# Patient Record
Sex: FEMALE | Race: WHITE | NOT HISPANIC OR LATINO | Employment: FULL TIME | URBAN - METROPOLITAN AREA
[De-identification: names, ages, dates, MRNs, and addresses within clinical notes are randomized per-mention and may not be internally consistent; named-entity substitution may affect disease eponyms.]

---

## 2020-08-04 DIAGNOSIS — I10 ESSENTIAL HYPERTENSION: Primary | ICD-10-CM

## 2020-08-04 RX ORDER — AMLODIPINE BESYLATE AND BENAZEPRIL HYDROCHLORIDE 5; 20 MG/1; MG/1
1 CAPSULE ORAL DAILY
COMMUNITY
End: 2020-08-04 | Stop reason: SDUPTHER

## 2020-08-04 RX ORDER — AMLODIPINE BESYLATE AND BENAZEPRIL HYDROCHLORIDE 5; 20 MG/1; MG/1
1 CAPSULE ORAL DAILY
Qty: 30 CAPSULE | Refills: 0 | Status: SHIPPED | OUTPATIENT
Start: 2020-08-04 | End: 2020-09-04 | Stop reason: SDUPTHER

## 2020-08-04 NOTE — TELEPHONE ENCOUNTER
Refill requested for amLODIPine-benazepril (LOTREL 5-20) 5-20 MG per capsule to be sent to the [facility] in [place]  Pt has been called to schedule an appointment

## 2020-09-04 DIAGNOSIS — E11.9 TYPE 2 DIABETES MELLITUS WITHOUT COMPLICATION, WITHOUT LONG-TERM CURRENT USE OF INSULIN (HCC): Primary | ICD-10-CM

## 2020-09-04 DIAGNOSIS — I10 ESSENTIAL HYPERTENSION: ICD-10-CM

## 2020-09-04 RX ORDER — HYDROCHLOROTHIAZIDE 25 MG/1
25 TABLET ORAL DAILY
Qty: 30 TABLET | Refills: 0 | Status: SHIPPED | OUTPATIENT
Start: 2020-09-04 | End: 2021-02-22 | Stop reason: SDUPTHER

## 2020-09-04 RX ORDER — AMLODIPINE BESYLATE AND BENAZEPRIL HYDROCHLORIDE 5; 20 MG/1; MG/1
1 CAPSULE ORAL DAILY
Qty: 30 CAPSULE | Refills: 0 | Status: SHIPPED | OUTPATIENT
Start: 2020-09-04 | End: 2020-10-23

## 2020-09-04 RX ORDER — HYDROCHLOROTHIAZIDE 25 MG/1
25 TABLET ORAL DAILY
COMMUNITY
End: 2020-09-04 | Stop reason: SDUPTHER

## 2020-09-04 NOTE — TELEPHONE ENCOUNTER
Patient requests refills on amlodipine-benazepril 5-20 mg one daily, metformin 500 mg twice daily and hydrochlorothiazide 25 mg one daily to [facility] in [place]

## 2020-09-04 NOTE — TELEPHONE ENCOUNTER
Pt called to reschedule her virtual visit for 9/18 but will still need her refills to be done prior to visit

## 2020-09-18 ENCOUNTER — TELEMEDICINE (OUTPATIENT)
Dept: FAMILY MEDICINE CLINIC | Facility: CLINIC | Age: 60
End: 2020-09-18
Payer: COMMERCIAL

## 2020-09-18 DIAGNOSIS — F32.A DEPRESSION, UNSPECIFIED DEPRESSION TYPE: ICD-10-CM

## 2020-09-18 DIAGNOSIS — R53.83 OTHER FATIGUE: Primary | ICD-10-CM

## 2020-09-18 DIAGNOSIS — E11.9 TYPE 2 DIABETES MELLITUS WITHOUT COMPLICATION, WITHOUT LONG-TERM CURRENT USE OF INSULIN (HCC): ICD-10-CM

## 2020-09-18 DIAGNOSIS — I10 ESSENTIAL HYPERTENSION: ICD-10-CM

## 2020-09-18 PROCEDURE — 1036F TOBACCO NON-USER: CPT | Performed by: FAMILY MEDICINE

## 2020-09-18 PROCEDURE — 99213 OFFICE O/P EST LOW 20 MIN: CPT | Performed by: FAMILY MEDICINE

## 2020-09-18 NOTE — PROGRESS NOTES
Virtual Regular Visit      Assessment/Plan:    Problem List Items Addressed This Visit     None               Reason for visit is No chief complaint on file  Encounter provider Kylee Andersen MD    Provider located at [address] [place] [id number] [phone]      Recent Visits  No visits were found meeting these conditions  Showing recent visits within past 7 days and meeting all other requirements     Today's Visits  Date Type Provider Dept   09/18/20 Telemedicine Kylee Andersen, [address] today's visits and meeting all other requirements     Future Appointments  No visits were found meeting these conditions  Showing future appointments within next 150 days and meeting all other requirements        The patient was identified by name and date of birth  Freddy España was informed that this is a telemedicine visit and that the visit is being conducted through [address] and patient was informed that this is not a secure, HIPAA-complaint platform  She agrees to proceed     My office door was closed  No one else was in the room  She acknowledged consent and understanding of privacy and security of the video platform  The patient has agreed to participate and understands they can discontinue the visit at any time  Patient is aware this is a billable service  Alin Rey is a 62 y o  female    Pt for checkup on HTN, lipids and really wanted to talk today about her stres level and social issues she is having  Pt has a son with progressive MS that is  aboput to [facility] care and has her mother  With stage 4 liver cancer working with her  Pt also has a   With disabilities  Pt  Is getting outside help that is not always reliable  Pt does  Not  Want any meds  But  Does agree to pyschologoical counseling  Pt  Has no suicidal ideation but  Is emotional at times and  Has some sleep issues   Pt is overdue for labs  Past Medical History:   Diagnosis Date    Diabetes mellitus ([facility] [address] )     Hypertension        Past Surgical History:   Procedure Laterality Date    WISDOM TOOTH EXTRACTION         Current Outpatient Medications   Medication Sig Dispense Refill    amLODIPine-benazepril (LOTREL 5-20) 5-20 MG per capsule Take 1 capsule by mouth daily 30 capsule 0    hydrochlorothiazide (HYDRODIURIL) 25 mg tablet Take 1 tablet (25 mg total) by mouth daily 30 tablet 0    metFORMIN (GLUCOPHAGE) 500 mg tablet Take 1 tablet (500 mg total) by mouth 2 (two) times a day with meals 60 tablet 0     No current facility-administered medications for this visit  Allergies not on file    Review of Systems   Constitutional: Negative for activity change, appetite change, fatigue and fever  HENT: Negative for congestion, ear pain, postnasal drip, rhinorrhea, sinus pressure, sinus pain, sneezing and sore throat  Eyes: Negative for pain and redness  Respiratory: Negative for apnea, cough, chest tightness, shortness of breath and wheezing  Cardiovascular: Negative for chest pain, palpitations and leg swelling  Gastrointestinal: Negative for abdominal pain, constipation, diarrhea, nausea and vomiting  Endocrine: Negative for cold intolerance and heat intolerance  Genitourinary: Negative for difficulty urinating, dysuria, frequency, hematuria and urgency  Musculoskeletal: Negative for arthralgias, back pain, gait problem and myalgias  Skin: Negative for rash  Neurological: Negative for dizziness, speech difficulty, weakness, numbness and headaches  Hematological: Does not bruise/bleed easily  Psychiatric/Behavioral: Positive for dysphoric mood and sleep disturbance  Negative for agitation, confusion, hallucinations, self-injury and suicidal ideas  The patient is nervous/anxious  Video Exam    There were no vitals filed for this visit      Physical Exam  Constitutional:       Appearance: Normal appearance  She is obese  HENT:      Head: Normocephalic and atraumatic  Eyes:      Pupils: Pupils are equal, round, and reactive to light  Pulmonary:      Effort: Pulmonary effort is normal    Skin:     General: Skin is warm and dry  Neurological:      General: No focal deficit present  Mental Status: She is alert and oriented to person, place, and time  I spent 15 minutes directly with the patient during this visit      VIRTUAL VISIT DISCLAIMER    Kamran Cornejo acknowledges that she has consented to an online visit or consultation  She understands that the online visit is based solely on information provided by her, and that, in the absence of a face-to-face physical evaluation by the physician, the diagnosis she receives is both limited and provisional in terms of accuracy and completeness  This is not intended to replace a full medical face-to-face evaluation by the physician  Kamran Cornejo understands and accepts these terms

## 2020-10-23 DIAGNOSIS — I10 ESSENTIAL HYPERTENSION: ICD-10-CM

## 2020-10-23 RX ORDER — AMLODIPINE BESYLATE AND BENAZEPRIL HYDROCHLORIDE 5; 20 MG/1; MG/1
1 CAPSULE ORAL DAILY
Qty: 30 CAPSULE | Refills: 0 | Status: SHIPPED | OUTPATIENT
Start: 2020-10-23 | End: 2020-11-21

## 2020-11-20 DIAGNOSIS — I10 ESSENTIAL HYPERTENSION: ICD-10-CM

## 2020-11-20 DIAGNOSIS — E11.9 TYPE 2 DIABETES MELLITUS WITHOUT COMPLICATION, WITHOUT LONG-TERM CURRENT USE OF INSULIN (HCC): ICD-10-CM

## 2020-11-21 RX ORDER — AMLODIPINE BESYLATE AND BENAZEPRIL HYDROCHLORIDE 5; 20 MG/1; MG/1
1 CAPSULE ORAL DAILY
Qty: 30 CAPSULE | Refills: 0 | Status: SHIPPED | OUTPATIENT
Start: 2020-11-21 | End: 2021-01-05

## 2021-01-03 DIAGNOSIS — E11.9 TYPE 2 DIABETES MELLITUS WITHOUT COMPLICATION, WITHOUT LONG-TERM CURRENT USE OF INSULIN (HCC): ICD-10-CM

## 2021-01-03 DIAGNOSIS — I10 ESSENTIAL HYPERTENSION: ICD-10-CM

## 2021-01-05 RX ORDER — AMLODIPINE BESYLATE AND BENAZEPRIL HYDROCHLORIDE 5; 20 MG/1; MG/1
1 CAPSULE ORAL DAILY
Qty: 30 CAPSULE | Refills: 0 | Status: SHIPPED | OUTPATIENT
Start: 2021-01-05 | End: 2021-02-09

## 2021-02-09 DIAGNOSIS — I10 ESSENTIAL HYPERTENSION: ICD-10-CM

## 2021-02-09 RX ORDER — AMLODIPINE BESYLATE AND BENAZEPRIL HYDROCHLORIDE 5; 20 MG/1; MG/1
CAPSULE ORAL
Qty: 30 CAPSULE | Refills: 0 | Status: SHIPPED | OUTPATIENT
Start: 2021-02-09 | End: 2021-03-16 | Stop reason: SDUPTHER

## 2021-02-22 ENCOUNTER — OFFICE VISIT (OUTPATIENT)
Dept: INTERNAL MEDICINE CLINIC | Facility: CLINIC | Age: 61
End: 2021-02-22
Payer: COMMERCIAL

## 2021-02-22 VITALS
DIASTOLIC BLOOD PRESSURE: 102 MMHG | SYSTOLIC BLOOD PRESSURE: 140 MMHG | TEMPERATURE: 98.2 F | HEART RATE: 97 BPM | WEIGHT: 292.4 LBS | BODY MASS INDEX: 49.92 KG/M2 | OXYGEN SATURATION: 97 % | HEIGHT: 64 IN

## 2021-02-22 DIAGNOSIS — Z23 NEED FOR PNEUMOCOCCAL VACCINATION: ICD-10-CM

## 2021-02-22 DIAGNOSIS — F32.A DEPRESSION, UNSPECIFIED DEPRESSION TYPE: ICD-10-CM

## 2021-02-22 DIAGNOSIS — I10 ESSENTIAL HYPERTENSION: Primary | ICD-10-CM

## 2021-02-22 DIAGNOSIS — E66.01 MORBID OBESITY (HCC): ICD-10-CM

## 2021-02-22 DIAGNOSIS — E11.9 TYPE 2 DIABETES MELLITUS WITHOUT COMPLICATION, WITHOUT LONG-TERM CURRENT USE OF INSULIN (HCC): ICD-10-CM

## 2021-02-22 PROCEDURE — 3725F SCREEN DEPRESSION PERFORMED: CPT | Performed by: INTERNAL MEDICINE

## 2021-02-22 PROCEDURE — 90471 IMMUNIZATION ADMIN: CPT

## 2021-02-22 PROCEDURE — 90732 PPSV23 VACC 2 YRS+ SUBQ/IM: CPT

## 2021-02-22 PROCEDURE — 99244 OFF/OP CNSLTJ NEW/EST MOD 40: CPT | Performed by: INTERNAL MEDICINE

## 2021-02-22 RX ORDER — MELATONIN
1000 DAILY
COMMUNITY

## 2021-02-22 RX ORDER — HYDROCHLOROTHIAZIDE 25 MG/1
25 TABLET ORAL DAILY
Qty: 90 TABLET | Refills: 1 | Status: SHIPPED | OUTPATIENT
Start: 2021-02-22 | End: 2021-03-22 | Stop reason: ALTCHOICE

## 2021-02-22 RX ORDER — ATORVASTATIN CALCIUM 40 MG/1
40 TABLET, FILM COATED ORAL
Qty: 30 TABLET | Refills: 3 | Status: SHIPPED | OUTPATIENT
Start: 2021-02-22 | End: 2021-09-20

## 2021-02-22 RX ORDER — FERROUS SULFATE 325(65) MG
325 TABLET ORAL EVERY OTHER DAY
COMMUNITY
End: 2021-03-22 | Stop reason: ALTCHOICE

## 2021-02-22 NOTE — PATIENT INSTRUCTIONS
Healthy Diet   The American Heart Association and the [employer] of Cardiology have long recommended a healthy diet for not only patients who are at risk for atherosclerotic cardiovascular disease (ASCVD) but also the general public  In keeping with this evidence-based recommendation, the "  2018 Guideline on the Management of Blood Cholesterol" stresses that a healthy diet should include adequate intake of these essentials:    Vegetables, fruits, and whole grains    Legumes and nuts    Low-fat dairy products    Low-fat poultry (without the skin)    Fish and seafood    Nontropical vegetable oils     The 2018 guideline does provide room for cultural food preferences in a healthy diet, but in general, all patients should limit their intake of saturated and trans fats, sweets, sugar-sweetened beverages, and red meats  Physical Activity   In addition to a healthy diet, all patients should include regular physical activity in their weekly routines, at moderate to vigorous intensity  Any activity is better than nothing, so if your patients can't meet the recommendation of vigorous activity, moderate-intensity activity can still help them reduce their risk of ASCVD  Below are the American Heart Association's recommendations for physical activity per week (preferably spread throughout the week):      For Overall Cardiovascular Health and Lowering Cholesterol    At least 150 minutes of moderate-intensity physical activity (for example, 30 minutes, 5 days a week), or    At least 75 minutes of vigorous-intensity physical activity (for example, 25 minutes, 3 days a week); or    A combination of moderate- and vigorous-intensity aerobic activity, and    At least 2 days of moderate- to high-intensity muscle-strengthening activities (such as resistance weight training) for additional health benefits    Weight Control   It's important to work with patients to help them reach and maintain a healthy weight (Table 3)  You may need to suggest that they adjust their caloric intake to avoid weight gain or, in overweight and obese patients, to promote weight loss  Table 3  Body Mass Index         For the full version of the 2018 Cholesterol Guideline, see Joey Dos Satnos, Lara Morse, Lavinia, et al  2018 AHA/ACC/AACVPR/AAPA/ABC/ACPM/ADA/AGS/APhA/ASPC/NLA/PCNA guideline on the management of blood cholesterol: a report of the [place] Task Force on Clinical Practice Guidelines [published online November 10, 2018]  Circulation  © Copyright 2018 American Heart Association, Inc , a 501(c)(3) not-for-profit  All rights reserved  Check  Change  Control  Cholesterol is a trademark  Unauthorized use prohibited   [id number]

## 2021-02-23 NOTE — ASSESSMENT & PLAN NOTE
No results found for: HGBA1C   · No known prior A1c  Will check A1c to assess disease severity  Only on monotherapy with Metformin 500mg once daily  · I have asked the patient to increase Metformin to twice daily, if tolerating, likely will titrate up to 1000mg twice daily at follow-up  May need additional oral agents and/or insulin therapy based on her A1c  · Discussed diabetic diet/carbohydrate counting  Patient has BG monitor/supplies though does not check her BG on Metformin  Will discuss monitoring when we have a better idea of her disease severity and possible need for additional antihyperglycemic agents based on A1c  · Follows with ophthalmologist yearly and screened for diabetic retinopathy  · Refer to podiatry - discussed importance of foot care  · Start atorvastatin 40mg every evening (ASVD risk >7 5%) - patient amenable for ASCVD risk reduction in the setting of DM2  LDL goal less than 100, ideally less than 70    Check lipid panel for baseline lipid profile prior to starting statin therapy   · Check microalbumin/Cr ratio - On ACEi already  · Patient to receive PPSV 23 today

## 2021-02-23 NOTE — ASSESSMENT & PLAN NOTE
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

## 2021-02-23 NOTE — PROGRESS NOTES
INTERNAL MEDICINE INITIAL OFFICE VISIT  [facility] - [facility] INTERNAL MEDICINE [place]    NAME: Jerman Amezcua  AGE: 62 y o  SEX: female  : 1960     DATE: 2021     Assessment and Plan:     Problem List Items Addressed This Visit        Endocrine    Type 2 diabetes mellitus without complication, without long-term current use of insulin ([address] )       No results found for: HGBA1C   · No known prior A1c  Will check A1c to assess disease severity  Only on monotherapy with Metformin 500mg once daily  · I have asked the patient to increase Metformin to twice daily, if tolerating, likely will titrate up to 1000mg twice daily at follow-up  May need additional oral agents and/or insulin therapy based on her A1c  · Discussed diabetic diet/carbohydrate counting  Patient has BG monitor/supplies though does not check her BG on Metformin  Will discuss monitoring when we have a better idea of her disease severity and possible need for additional antihyperglycemic agents based on A1c  · Follows with ophthalmologist yearly and screened for diabetic retinopathy  · Refer to podiatry - discussed importance of foot care  · Start atorvastatin 40mg every evening (ASVD risk >7 5%) - patient amenable for ASCVD risk reduction in the setting of DM2  LDL goal less than 100, ideally less than 70    Check lipid panel for baseline lipid profile prior to starting statin therapy   · Check microalbumin/Cr ratio - On ACEi already  · Patient to receive PPSV 23 today         Relevant Medications    atorvastatin (LIPITOR) 40 mg tablet    metFORMIN (GLUCOPHAGE) 500 mg tablet    Other Relevant Orders    CBC and differential    Comprehensive metabolic panel    Lipid Panel with Direct LDL reflex    HEMOGLOBIN A1C W/ EAG ESTIMATION    Microalbumin / creatinine urine ratio    Ambulatory referral to Podiatry       Cardiovascular and Mediastinum    Essential hypertension - Primary     · Currently above goal  SBP goal <120 DBP goal<80  I have asked her to take her HCTZ 25mg daily rather than prn as she was doing prior  Continue Lotrel 5-20mg daily for now  Discussed keeping daily BP log  · Will reassess in follow-up in 4 weeks         Relevant Medications    hydrochlorothiazide (HYDRODIURIL) 25 mg tablet    Other Relevant Orders    CBC and differential    Comprehensive metabolic panel    Lipid Panel with Direct LDL reflex    TSH, 3rd generation with Free T4 reflex       Other    Depression     · Appropriate grief response with loss of her mother last month to pancreatic CA  Patient'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  PHQ-9 = 9  Will continue to provide emotional support/resources as patient allows  Morbid obesity ([facility] [address] )     BMI Counseling: Body mass index is 50 19 kg/m²  The BMI is above normal  Nutrition recommendations include reducing portion sizes, decreasing overall calorie intake, 3-5 servings of fruits/vegetables daily, reducing fast food intake, consuming healthier snacks, decreasing soda and/or juice intake, moderation in carbohydrate intake, increasing intake of lean protein, reducing intake of saturated fat and trans fat and reducing intake of cholesterol  Exercise recommendations include moderate aerobic physical activity for 150 minutes/week, exercising 3-5 times per week and joining a gym  Other Visit Diagnoses     Need for pneumococcal vaccination        Relevant Orders    PNEUMOCOCCAL POLYSACCHARIDE VACCINE 23-VALENT =>1YO SQ IM (Completed)          Return in about 4 weeks (around 3/22/2021)  Patient to have an annual physical for her next office visit  Will discuss age-specific cancer screenings at that time as well as provide more counseling     Chief Complaint:     Chief Complaint   Patient presents with    Establish Care     establish care      History of Present Illness:     I had the pleasure of seeing Ms Vish Lemus in the office today to establish care     Patient has a past medical history significant for type 2 diabetes mellitus, hypertension, obesity, and depression  Patient is maintained on metformin 500 mg daily for her diabetes  Patient has not seen a physician in a few years  She does have a glucometer as well as testing supplies at home though does not check her blood glucose regularly  She reports a poor diet and little physical activity  She does not restrict or count carbohydrates  Patient has had multiple stressors in her life over the past few years and has put family members medical conditions before her own  Patient has a son with multiple sclerosis home the patient is also a caregiver for  In addition, patient's mother was on home hospice with her at home and passed away last month  She was also a caregiver for her over the last year and a half  Her  also has medical problems and she is involved in his medical care as well  She denies any shortness of breath/  PND /orthopnea or chest pain  She does have some lower extremity edema with overlying skin changes which she states is chronic  She states it does improve a little with leg elevation  She is not on any diuretic therapy  She has no known history of heart failure or coronary artery disease  The following portions of the patient's history were reviewed and updated as appropriate: allergies, current medications, past family history, past medical history, past social history, past surgical history and problem list      Review of Systems:     Review of Systems   Constitutional: Positive for activity change and fatigue  Negative for appetite change, chills and fever  HENT: Negative for congestion, rhinorrhea and sore throat  Respiratory: Negative for shortness of breath and wheezing  Cardiovascular: Positive for leg swelling  Negative for chest pain and palpitations  Gastrointestinal: Negative for abdominal pain, blood in stool, constipation, diarrhea, nausea and vomiting  Genitourinary: Negative for difficulty urinating, frequency and vaginal bleeding  Musculoskeletal: Positive for arthralgias  Neurological: Negative for dizziness, seizures, weakness and headaches  Psychiatric/Behavioral: Negative for self-injury and suicidal ideas  Past Medical History:     Past Medical History:   Diagnosis Date    Diabetes mellitus ([address] )     Hypertension         Past Surgical History:     Past Surgical History:   Procedure Laterality Date    BREAST BIOPSY Right 2016    Done at [address] [place] [address]          Social History:   She reports that she has quit smoking  Her smoking use included cigarettes  She has a 30 00 pack-year smoking history  She has never used smokeless tobacco  She reports previous alcohol use  She reports current drug use  Drug: Marijuana       Family History:     Family History   Problem Relation Age of Onset    Heart disease Mother     Diabetes Mother     Liver cancer Mother     Kidney cancer Mother     Heart disease Father     Colon cancer Father     Skin cancer Father     Stroke Brother         Current Medications:     Current Outpatient Medications:     amLODIPine-benazepril (LOTREL 5-20) 5-20 MG per capsule, take 1 capsule by mouth once daily, Disp: 30 capsule, Rfl: 0    cholecalciferol (VITAMIN D3) 1,000 units tablet, Take 1,000 Units by mouth daily, Disp: , Rfl:     ferrous sulfate 325 (65 Fe) mg tablet, Take 325 mg by mouth every other day, Disp: , Rfl:     hydrochlorothiazide (HYDRODIURIL) 25 mg tablet, Take 1 tablet (25 mg total) by mouth daily, Disp: 90 tablet, Rfl: 1    metFORMIN (GLUCOPHAGE) 500 mg tablet, Take 1 tablet (500 mg total) by mouth 2 (two) times a day with meals, Disp: 60 tablet, Rfl: 0    atorvastatin (LIPITOR) 40 mg tablet, Take 1 tablet (40 mg total) by mouth daily after dinner, Disp: 30 tablet, Rfl: 3     Allergies:   No Known Allergies     Physical Exam:     BP (!) 140/102 (BP Location: Left arm, Patient Position: Sitting, Cuff Size: Adult)   Pulse 97   Temp 98 2 °F (36 8 °C) (Tympanic)   Ht 5' 4" (1 626 m)   Wt 133 kg (292 lb 6 4 oz)   SpO2 97%   BMI 50 19 kg/m²     Physical Exam  Vitals signs reviewed  Constitutional:       General: She is not in acute distress  Appearance: She is not toxic-appearing  Cardiovascular:      Rate and Rhythm: Normal rate and regular rhythm  Heart sounds: No murmur  Pulmonary:      Effort: No respiratory distress  Breath sounds: No wheezing or rhonchi  Abdominal:      General: Bowel sounds are normal  There is no distension  Palpations: Abdomen is soft  Tenderness: There is no abdominal tenderness  There is no guarding  Comments: Obese abdomen   Musculoskeletal:         General: Swelling (1+ pitting edema with overlying venous stasis changes) present  Neurological:      General: No focal deficit present  Mental Status: She is alert and oriented to person, place, and time  Mental status is at baseline  Data:     Laboratory Results: I have personally reviewed the pertinent laboratory results/reports   Radiology/Other Diagnostic Testing Results: I have personally reviewed pertinent reports        Alyse Davies MD  [facility] INTERNAL MEDICINE [place]

## 2021-02-23 NOTE — ASSESSMENT & PLAN NOTE
BMI Counseling: Body mass index is 50 19 kg/m²  The BMI is above normal  Nutrition recommendations include reducing portion sizes, decreasing overall calorie intake, 3-5 servings of fruits/vegetables daily, reducing fast food intake, consuming healthier snacks, decreasing soda and/or juice intake, moderation in carbohydrate intake, increasing intake of lean protein, reducing intake of saturated fat and trans fat and reducing intake of cholesterol  Exercise recommendations include moderate aerobic physical activity for 150 minutes/week, exercising 3-5 times per week and joining a gym

## 2021-02-23 NOTE — ASSESSMENT & PLAN NOTE
· Currently above goal  SBP goal <120 DBP goal<80  I have asked her to take her HCTZ 25mg daily rather than prn as she was doing prior  Continue Lotrel 5-20mg daily for now  Discussed keeping daily BP log    · Will reassess in follow-up in 4 weeks

## 2021-02-27 ENCOUNTER — LAB (OUTPATIENT)
Dept: LAB | Facility: CLINIC | Age: 61
End: 2021-02-27
Payer: COMMERCIAL

## 2021-02-27 ENCOUNTER — TRANSCRIBE ORDERS (OUTPATIENT)
Dept: LAB | Facility: CLINIC | Age: 61
End: 2021-02-27

## 2021-02-27 DIAGNOSIS — I10 ESSENTIAL HYPERTENSION: ICD-10-CM

## 2021-02-27 DIAGNOSIS — E11.9 TYPE 2 DIABETES MELLITUS WITHOUT COMPLICATION, WITHOUT LONG-TERM CURRENT USE OF INSULIN (HCC): ICD-10-CM

## 2021-02-27 LAB
ALBUMIN SERPL BCP-MCNC: 3.7 G/DL (ref 3.5–5)
ALP SERPL-CCNC: 66 U/L (ref 46–116)
ALT SERPL W P-5'-P-CCNC: 40 U/L (ref 12–78)
ANION GAP SERPL CALCULATED.3IONS-SCNC: 5 MMOL/L (ref 4–13)
AST SERPL W P-5'-P-CCNC: 19 U/L (ref 5–45)
BACTERIA UR QL AUTO: ABNORMAL /HPF
BASOPHILS # BLD AUTO: 0.07 THOUSANDS/ΜL (ref 0–0.1)
BASOPHILS NFR BLD AUTO: 1 % (ref 0–1)
BILIRUB SERPL-MCNC: 0.7 MG/DL (ref 0.2–1)
BILIRUB UR QL STRIP: NEGATIVE
BUN SERPL-MCNC: 19 MG/DL (ref 5–25)
CALCIUM SERPL-MCNC: 9.2 MG/DL (ref 8.3–10.1)
CHLORIDE SERPL-SCNC: 105 MMOL/L (ref 100–108)
CHOLEST SERPL-MCNC: 169 MG/DL (ref 50–200)
CLARITY UR: ABNORMAL
CO2 SERPL-SCNC: 31 MMOL/L (ref 21–32)
COLOR UR: YELLOW
CREAT SERPL-MCNC: 0.89 MG/DL (ref 0.6–1.3)
CREAT UR-MCNC: 131 MG/DL
EOSINOPHIL # BLD AUTO: 0.1 THOUSAND/ΜL (ref 0–0.61)
EOSINOPHIL NFR BLD AUTO: 2 % (ref 0–6)
ERYTHROCYTE [DISTWIDTH] IN BLOOD BY AUTOMATED COUNT: 14.6 % (ref 11.6–15.1)
EST. AVERAGE GLUCOSE BLD GHB EST-MCNC: 154 MG/DL
GFR SERPL CREATININE-BSD FRML MDRD: 71 ML/MIN/1.73SQ M
GLUCOSE P FAST SERPL-MCNC: 124 MG/DL (ref 65–99)
GLUCOSE UR STRIP-MCNC: NEGATIVE MG/DL
HBA1C MFR BLD: 7 %
HCT VFR BLD AUTO: 44.3 % (ref 34.8–46.1)
HDLC SERPL-MCNC: 61 MG/DL
HGB BLD-MCNC: 13.7 G/DL (ref 11.5–15.4)
HGB UR QL STRIP.AUTO: NEGATIVE
IMM GRANULOCYTES # BLD AUTO: 0.02 THOUSAND/UL (ref 0–0.2)
IMM GRANULOCYTES NFR BLD AUTO: 0 % (ref 0–2)
KETONES UR STRIP-MCNC: NEGATIVE MG/DL
LDLC SERPL CALC-MCNC: 95 MG/DL (ref 0–100)
LEUKOCYTE ESTERASE UR QL STRIP: NEGATIVE
LYMPHOCYTES # BLD AUTO: 1.48 THOUSANDS/ΜL (ref 0.6–4.47)
LYMPHOCYTES NFR BLD AUTO: 23 % (ref 14–44)
MCH RBC QN AUTO: 26.9 PG (ref 26.8–34.3)
MCHC RBC AUTO-ENTMCNC: 30.9 G/DL (ref 31.4–37.4)
MCV RBC AUTO: 87 FL (ref 82–98)
MICROALBUMIN UR-MCNC: 17.7 MG/L (ref 0–20)
MICROALBUMIN/CREAT 24H UR: 14 MG/G CREATININE (ref 0–30)
MONOCYTES # BLD AUTO: 0.56 THOUSAND/ΜL (ref 0.17–1.22)
MONOCYTES NFR BLD AUTO: 9 % (ref 4–12)
NEUTROPHILS # BLD AUTO: 4.31 THOUSANDS/ΜL (ref 1.85–7.62)
NEUTS SEG NFR BLD AUTO: 65 % (ref 43–75)
NITRITE UR QL STRIP: NEGATIVE
NON-SQ EPI CELLS URNS QL MICRO: ABNORMAL /HPF
NRBC BLD AUTO-RTO: 0 /100 WBCS
PH UR STRIP.AUTO: 6 [PH]
PLATELET # BLD AUTO: 260 THOUSANDS/UL (ref 149–390)
PMV BLD AUTO: 10.5 FL (ref 8.9–12.7)
POTASSIUM SERPL-SCNC: 4.7 MMOL/L (ref 3.5–5.3)
PROT SERPL-MCNC: 6.9 G/DL (ref 6.4–8.2)
PROT UR STRIP-MCNC: NEGATIVE MG/DL
RBC # BLD AUTO: 5.09 MILLION/UL (ref 3.81–5.12)
RBC #/AREA URNS AUTO: ABNORMAL /HPF
SODIUM SERPL-SCNC: 141 MMOL/L (ref 136–145)
SP GR UR STRIP.AUTO: 1.02 (ref 1–1.03)
TRIGL SERPL-MCNC: 65 MG/DL
TSH SERPL DL<=0.05 MIU/L-ACNC: 3.42 UIU/ML (ref 0.36–3.74)
UROBILINOGEN UR QL STRIP.AUTO: 0.2 E.U./DL
WBC # BLD AUTO: 6.54 THOUSAND/UL (ref 4.31–10.16)
WBC #/AREA URNS AUTO: ABNORMAL /HPF

## 2021-02-27 PROCEDURE — 85025 COMPLETE CBC W/AUTO DIFF WBC: CPT

## 2021-02-27 PROCEDURE — 82043 UR ALBUMIN QUANTITATIVE: CPT | Performed by: INTERNAL MEDICINE

## 2021-02-27 PROCEDURE — 3051F HG A1C>EQUAL 7.0%<8.0%: CPT | Performed by: INTERNAL MEDICINE

## 2021-02-27 PROCEDURE — 84443 ASSAY THYROID STIM HORMONE: CPT

## 2021-02-27 PROCEDURE — 83036 HEMOGLOBIN GLYCOSYLATED A1C: CPT

## 2021-02-27 PROCEDURE — 82570 ASSAY OF URINE CREATININE: CPT | Performed by: INTERNAL MEDICINE

## 2021-02-27 PROCEDURE — 80061 LIPID PANEL: CPT

## 2021-02-27 PROCEDURE — 3061F NEG MICROALBUMINURIA REV: CPT | Performed by: INTERNAL MEDICINE

## 2021-02-27 PROCEDURE — 80053 COMPREHEN METABOLIC PANEL: CPT

## 2021-02-27 PROCEDURE — 36415 COLL VENOUS BLD VENIPUNCTURE: CPT

## 2021-02-27 PROCEDURE — 81001 URINALYSIS AUTO W/SCOPE: CPT | Performed by: FAMILY MEDICINE

## 2021-03-10 DIAGNOSIS — Z23 ENCOUNTER FOR IMMUNIZATION: ICD-10-CM

## 2021-03-14 DIAGNOSIS — I10 ESSENTIAL HYPERTENSION: ICD-10-CM

## 2021-03-15 RX ORDER — AMLODIPINE BESYLATE AND BENAZEPRIL HYDROCHLORIDE 5; 20 MG/1; MG/1
1 CAPSULE ORAL DAILY
Qty: 30 CAPSULE | Refills: 0 | OUTPATIENT
Start: 2021-03-15

## 2021-03-16 DIAGNOSIS — I10 ESSENTIAL HYPERTENSION: ICD-10-CM

## 2021-03-16 RX ORDER — AMLODIPINE BESYLATE AND BENAZEPRIL HYDROCHLORIDE 5; 20 MG/1; MG/1
1 CAPSULE ORAL DAILY
Qty: 90 CAPSULE | Refills: 1 | Status: SHIPPED | OUTPATIENT
Start: 2021-03-16 | End: 2021-03-22

## 2021-03-16 RX ORDER — AMLODIPINE BESYLATE AND BENAZEPRIL HYDROCHLORIDE 5; 20 MG/1; MG/1
1 CAPSULE ORAL DAILY
Qty: 30 CAPSULE | Refills: 0 | Status: CANCELLED | OUTPATIENT
Start: 2021-03-16

## 2021-03-22 ENCOUNTER — OFFICE VISIT (OUTPATIENT)
Dept: INTERNAL MEDICINE CLINIC | Facility: CLINIC | Age: 61
End: 2021-03-22
Payer: COMMERCIAL

## 2021-03-22 VITALS
HEART RATE: 84 BPM | OXYGEN SATURATION: 97 % | BODY MASS INDEX: 50.02 KG/M2 | SYSTOLIC BLOOD PRESSURE: 134 MMHG | DIASTOLIC BLOOD PRESSURE: 88 MMHG | HEIGHT: 64 IN | WEIGHT: 293 LBS | TEMPERATURE: 97.7 F

## 2021-03-22 DIAGNOSIS — C43.30 MALIGNANT MELANOMA OF FACE (HCC): ICD-10-CM

## 2021-03-22 DIAGNOSIS — I10 ESSENTIAL HYPERTENSION: ICD-10-CM

## 2021-03-22 DIAGNOSIS — Z12.31 SCREENING MAMMOGRAM, ENCOUNTER FOR: ICD-10-CM

## 2021-03-22 DIAGNOSIS — E11.9 TYPE 2 DIABETES MELLITUS WITHOUT COMPLICATION, WITHOUT LONG-TERM CURRENT USE OF INSULIN (HCC): Primary | ICD-10-CM

## 2021-03-22 DIAGNOSIS — E66.01 MORBID OBESITY (HCC): ICD-10-CM

## 2021-03-22 DIAGNOSIS — Z12.11 SCREENING FOR COLON CANCER: ICD-10-CM

## 2021-03-22 DIAGNOSIS — R25.2 MUSCLE CRAMPS: ICD-10-CM

## 2021-03-22 DIAGNOSIS — M79.89 LEG SWELLING: ICD-10-CM

## 2021-03-22 PROCEDURE — 99215 OFFICE O/P EST HI 40 MIN: CPT | Performed by: INTERNAL MEDICINE

## 2021-03-22 PROCEDURE — 1036F TOBACCO NON-USER: CPT | Performed by: INTERNAL MEDICINE

## 2021-03-22 PROCEDURE — 3008F BODY MASS INDEX DOCD: CPT | Performed by: INTERNAL MEDICINE

## 2021-03-22 PROCEDURE — 3075F SYST BP GE 130 - 139MM HG: CPT | Performed by: INTERNAL MEDICINE

## 2021-03-22 PROCEDURE — 3079F DIAST BP 80-89 MM HG: CPT | Performed by: INTERNAL MEDICINE

## 2021-03-22 RX ORDER — AMLODIPINE BESYLATE AND BENAZEPRIL HYDROCHLORIDE 5; 40 MG/1; MG/1
1 CAPSULE ORAL DAILY
Qty: 30 CAPSULE | Refills: 3 | Status: SHIPPED | OUTPATIENT
Start: 2021-03-22 | End: 2021-09-02 | Stop reason: SDUPTHER

## 2021-03-22 RX ORDER — FUROSEMIDE 20 MG/1
20 TABLET ORAL AS NEEDED
Qty: 30 TABLET | Refills: 0 | Status: SHIPPED | OUTPATIENT
Start: 2021-03-22 | End: 2021-05-17

## 2021-03-22 RX ORDER — POTASSIUM CHLORIDE 20 MEQ/1
20 TABLET, EXTENDED RELEASE ORAL AS NEEDED
Qty: 30 TABLET | Refills: 5 | Status: SHIPPED | OUTPATIENT
Start: 2021-03-22 | End: 2022-03-30

## 2021-03-22 NOTE — PROGRESS NOTES
Assessment/Plan:    Type 2 diabetes mellitus without complication, without long-term current use of insulin ([facility])    Lab Results   Component Value Date    HGBA1C 7 0 (H) 02/27/2021     Plan:  · Patient was advised to take metformin 1000 mg twice a day as she reported good tolerance to the up titrating since last visit  · Check hemoglobin A1c in 6 months  ·  continue statin  ·  microalbumin was checked and was normal  ·  continue diabetic diet  Patient is compliant  ·   Referral to podiatry was placed on prior visit  ·  patient follows up with Ophthalmology routinely and up-to-date with her exams  Essential hypertension   Blood pressure has been better controlled since last visit  Still slightly above goal     Plan:  ·   Will increase Lotrel does to 5- 40 mg up from 5- 20 mg  ·  patient is reporting cramping from hydrochlorothiazide therefore will discontinue and start on Lasix 20 mg only as needed for leg swelling and also prescribed potassium supplement 20 mEq on the days that she takes Lasix on  ·  advised to continue monitor blood pressure and keep a log of it    Leg swelling   Start Lasix 20 mg as needed for leg swelling with potassium chloride supplements 20 mEq on the days that you use Lasix on    Morbid obesity (HCC)   Discussed lifestyle changes    Malignant melanoma of face ([facility] [address] )   Refer to dermatology       Diagnoses and all orders for this visit:    Type 2 diabetes mellitus without complication, without long-term current use of insulin ([facility])  -     metFORMIN (GLUCOPHAGE) 500 mg tablet; Take 2 tablets (1,000 mg total) by mouth 2 (two) times a day with meals  -     Hemoglobin A1C; Future  -     Basic metabolic panel; Future    Essential hypertension  -     amLODIPine-benazepril (LOTREL) 5-40 MG per capsule; Take 1 capsule by mouth daily  -     Basic metabolic panel; Future  -     Magnesium; Future    Screening for colon cancer  -     Cancel: Ambulatory referral to Gastroenterology;  Future  - Ambulatory referral to Gastroenterology; Future    Malignant melanoma of face [facility])  -     Ambulatory referral to Dermatology; Future    Screening mammogram, encounter for  -     Mammo screening bilateral w 3d & cad; Future    Leg swelling  -     furosemide (LASIX) 20 mg tablet; Take 1 tablet (20 mg total) by mouth as needed (Swelling)    Muscle cramps  -     potassium chloride (K-DUR,KLOR-CON) 20 mEq tablet; Take 1 tablet (20 mEq total) by mouth as needed (Take 1 tablet only when you take lasix)    Morbid obesity (HCC)          Subjective:      Patient ID: Vona Leyden is a 62 y o  female  This is a 28-year-old pleasant lady who is here follow follow-up visit  She has medical history significant for diabetes type 2, obesity, hypertension  We discussed diabetes regimen and adjusted based on her hemoglobin A1c  Also discussed her blood pressure which is now better controlled but still slightly above target therefore further adjustments were made to her medications  Patient was advised to continue monitor her blood pressure and keep a log of it  Patient still and normal grief after her mother passed to a couple of weeks ago  Provided support and counseling and advised patient to reach out if she needs any support and she expressed appreciation  Discussed screening recommendations with patient  GI referral for colonoscopy was ordered  Patient stated that she had a colonoscopy 5 years ago in [facility], records not available  She stated that she was told she has due for a repeat colonoscopy 5 years after her last one  Discussed mammogram with patient and an order was placed as she stated that she had a mammogram 2 years ago but again recurs not available  Also discussed a referral to gyn however patient stated that she will think about it right now and would like to address it next visit is but not now   Told the patient that we can perform Pap smear in our clinic and to let us know if she is willing to have 1 done and she expressed understanding and willing to let us know once she makes her mind  Patient also stated that she was previously treated for facial melanoma and would like to be seen by a dermatologist as she is developing a new lesion on her face that she is concerned about  A referral to dermatology was ordered for her on this visit  We will follow-up with patient in 4 weeks to follow-up on blood pressure  The following portions of the patient's history were reviewed and updated as appropriate: allergies, current medications, past family history, past medical history, past social history, past surgical history and problem list     Review of Systems   Constitutional: Negative for activity change, appetite change, chills, diaphoresis and fever  HENT: Negative for congestion and sore throat  Respiratory: Negative for cough and shortness of breath  Cardiovascular: Positive for leg swelling  Negative for chest pain and palpitations  Gastrointestinal: Negative for abdominal pain, constipation, diarrhea, nausea and vomiting  Genitourinary: Negative for difficulty urinating and dysuria  Neurological: Negative for dizziness, light-headedness and headaches  Psychiatric/Behavioral: Negative for confusion  The patient is not nervous/anxious  All other systems reviewed and are negative  Objective:      /88 (BP Location: Left arm, Patient Position: Sitting, Cuff Size: Adult)   Pulse 84   Temp 97 7 °F (36 5 °C) (Tympanic)   Ht 5' 4" (1 626 m)   Wt 133 kg (293 lb 9 6 oz)   SpO2 97%   BMI 50 40 kg/m²          Physical Exam  Vitals signs reviewed  Constitutional:       General: She is not in acute distress  Appearance: She is not ill-appearing or diaphoretic  HENT:      Head: Normocephalic and atraumatic  Nose: Nose normal       Mouth/Throat:      Mouth: Mucous membranes are moist       Pharynx: Oropharynx is clear     Eyes:      General: No scleral icterus  Extraocular Movements: Extraocular movements intact  Conjunctiva/sclera: Conjunctivae normal    Neck:      Musculoskeletal: Neck supple  Cardiovascular:      Rate and Rhythm: Normal rate and regular rhythm  Heart sounds: Normal heart sounds  No murmur  Pulmonary:      Effort: Pulmonary effort is normal  No respiratory distress  Breath sounds: Normal breath sounds  No wheezing or rales  Abdominal:      General: Bowel sounds are normal       Palpations: Abdomen is soft  Tenderness: There is no abdominal tenderness  Musculoskeletal: Normal range of motion  Right lower leg: Edema present  Left lower leg: Edema present  Skin:     General: Skin is warm and dry  Capillary Refill: Capillary refill takes less than 2 seconds  Neurological:      General: No focal deficit present  Mental Status: She is alert and oriented to person, place, and time  Psychiatric:         Mood and Affect: Mood normal          Behavior: Behavior normal          BMI Counseling: Body mass index is 50 4 kg/m²  The BMI is above normal  Nutrition recommendations include reducing portion sizes, decreasing overall calorie intake, 3-5 servings of fruits/vegetables daily, reducing fast food intake, consuming healthier snacks, decreasing soda and/or juice intake, moderation in carbohydrate intake, increasing intake of lean protein, reducing intake of saturated fat and trans fat and reducing intake of cholesterol  Exercise recommendations include moderate aerobic physical activity for 150 minutes/week and exercising 3-5 times per week       Nutrition Assessment and Intervention:     Reviewed food recall journal    Nutrition patient handout reviewed with patient      Physical Activity Assessment and Intervention:    Activity journal reviewed    Physical Activity patient handout reviewed with patient      Emotional and Mental Well-being, Sleep, Connectedness Assessment and Intervention:    Sleep/stress assessment performed    Depression and anxiety screening performed and reviewed

## 2021-03-22 NOTE — ASSESSMENT & PLAN NOTE
Lab Results   Component Value Date    HGBA1C 7 0 (H) 02/27/2021     Plan:  · Patient was advised to take metformin 1000 mg twice a day as she reported good tolerance to the up titrating since last visit  · Check hemoglobin A1c in 6 months  ·  continue statin  ·  microalbumin was checked and was normal  ·  continue diabetic diet  Patient is compliant  ·   Referral to podiatry was placed on prior visit  ·  patient follows up with Ophthalmology routinely and up-to-date with her exams

## 2021-03-22 NOTE — ASSESSMENT & PLAN NOTE
Blood pressure has been better controlled since last visit    Still slightly above goal     Plan:  ·   Will increase Lotrel does to 5- 40 mg up from 5- 20 mg  ·  patient is reporting cramping from hydrochlorothiazide therefore will discontinue and start on Lasix 20 mg only as needed for leg swelling and also prescribed potassium supplement 20 mEq on the days that she takes Lasix on  ·  advised to continue monitor blood pressure and keep a log of it

## 2021-03-22 NOTE — PATIENT INSTRUCTIONS
· Thank you for coming to see us today, it was a pleasure meeting you    · Medications changes:  · Increase metformin to 1000 mg twice a day, new script sent to your pharmacy   · Increase lotrel dose to 5-40 mg from 5-20 mg, new script sent to your pharmacy   · Discontinue hydrochlorothiazide  · Start lasix 20 mg as needed for swelling and take potassium pill only on days that you take lasix on    · A referral to GI for screening colonoscopy ordered  · A referral to dermatology ordered  · Mammogram was ordered for you    Obesity   AMBULATORY CARE:   Obesity  is when your body mass index (BMI) is greater than 30  Your healthcare provider will use your height and weight to measure your BMI  The risks of obesity include  many health problems, such as injuries or physical disability  You may need tests to check for the following:  · Diabetes    · High blood pressure or high cholesterol    · Heart disease    · Gallbladder or liver disease    · Cancer of the colon, breast, prostate, liver, or kidney    · Sleep apnea    · Arthritis or gout    Seek care immediately if:   · You have a severe headache, confusion, or difficulty speaking  · You have weakness on one side of your body  · You have chest pain, sweating, or shortness of breath  Contact your healthcare provider if:   · You have symptoms of gallbladder or liver disease, such as pain in your upper abdomen  · You have knee or hip pain and discomfort while walking  · You have symptoms of diabetes, such as intense hunger and thirst, and frequent urination  · You have symptoms of sleep apnea, such as snoring or daytime sleepiness  · You have questions or concerns about your condition or care  Treatment for obesity  focuses on helping you lose weight to improve your health  Even a small decrease in BMI can reduce the risk for many health problems   Your healthcare provider will help you set a weight-loss goal   · Lifestyle changes  are the first step in treating obesity  These include making healthy food choices and getting regular physical activity  Your healthcare provider may suggest a weight-loss program that involves coaching, education, and therapy  · Medicine  may help you lose weight when it is used with a healthy diet and physical activity  · Surgery  can help you lose weight if you are very obese and have other health problems  There are several types of weight-loss surgery  Ask your healthcare provider for more information  Be successful losing weight:   · Set small, realistic goals  An example of a small goal is to walk for 20 minutes 5 days a week  Anther goal is to lose 5% of your body weight  · Tell friends, family members, and coworkers about your goals  and ask for their support  Ask a friend to lose weight with you, or join a weight-loss support group  · Identify foods or triggers that may cause you to overeat , and find ways to avoid them  Remove tempting high-calorie foods from your home and workplace  Place a bowl of fresh fruit on your kitchen counter  If stress causes you to eat, then find other ways to cope with stress  · Keep a diary to track what you eat and drink  Also write down how many minutes of physical activity you do each day  Weigh yourself once a week and record it in your diary  Eating changes: You will need to eat 500 to 1,000 fewer calories each day than you currently eat to lose 1 to 2 pounds a week  The following changes will help you cut calories:  · Eat smaller portions  Use small plates, no larger than 9 inches in diameter  Fill your plate half full of fruits and vegetables  Measure your food using measuring cups until you know what a serving size looks like  · Eat 3 meals and 1 or 2 snacks each day  Plan your meals in advance  Juan Cox and eat at home most of the time  Eat slowly  Do not skip meals  Skipping meals can lead to overeating later in the day   This can make it harder for you to lose weight  Talk with a dietitian to help you make a meal plan and schedule that is right for you  · Eat fruits and vegetables at every meal   They are low in calories and high in fiber, which makes you feel full  Do not add butter, margarine, or cream sauce to vegetables  Use herbs to season steamed vegetables  · Eat less fat and fewer fried foods  Eat more baked or grilled chicken and fish  These protein sources are lower in calories and fat than red meat  Limit fast food  Dress your salads with olive oil and vinegar instead of bottled dressing  · Limit the amount of sugar you eat  Do not drink sugary beverages  Limit alcohol  Activity changes:  Physical activity is good for your body in many ways  It helps you burn calories and build strong muscles  It decreases stress and depression, and improves your mood  It can also help you sleep better  Talk to your healthcare provider before you begin an exercise program   · Exercise for at least 30 minutes 5 days a week  Start slowly  Set aside time each day for physical activity that you enjoy and that is convenient for you  It is best to do both weight training and an activity that increases your heart rate, such as walking, bicycling, or swimming  · Find ways to be more active  Do yard work and housecleaning  Walk up the stairs instead of using elevators  Spend your leisure time going to events that require walking, such as outdoor festivals or fairs  This extra physical activity can help you lose weight and keep it off  Follow up with your healthcare provider as directed: You may need to meet with a dietitian  Write down your questions so you remember to ask them during your visits  © Copyright [address] Information is for End User's use only and may not be sold, redistributed or otherwise used for commercial purposes   All illustrations and images included in CareNotes® are the copyrighted property of [employer]  or [facility]  The above information is an  only  It is not intended as medical advice for individual conditions or treatments  Talk to your doctor, nurse or pharmacist before following any medical regimen to see if it is safe and effective for you  Weight Management   AMBULATORY CARE:   Why it is important to manage your weight:  Being overweight increases your risk of health conditions such as heart disease, high blood pressure, type 2 diabetes, and certain types of cancer  It can also increase your risk for osteoarthritis, sleep apnea, and other respiratory problems  Aim for a slow, steady weight loss  Even a small amount of weight loss can lower your risk of health problems  How to lose weight safely:  A safe and healthy way to lose weight is to eat fewer calories and get regular exercise  · You can lose up about 1 pound a week by decreasing the number of calories you eat by 500 calories each day  You can decrease calories by eating smaller portion sizes or by cutting out high-calorie foods  Read labels to find out how many calories are in the foods you eat  · You can also burn calories with exercise such as walking, swimming, or biking  You will be more likely to keep weight off if you make these changes part of your lifestyle  Exercise at least 30 minutes per day on most days of the week  You can also fit in more physical activity by taking the stairs instead of the elevator or parking farther away from stores  Ask your healthcare provider about the best exercise plan for you  Healthy meal plan for weight management:  A healthy meal plan includes a variety of foods, contains fewer calories, and helps you stay healthy  A healthy meal plan includes the following:     · Eat whole-grain foods more often  A healthy meal plan should contain fiber  Fiber is the part of grains, fruits, and vegetables that is not broken down by your body   Whole-grain foods are healthy and provide extra fiber in your diet  Some examples of whole-grain foods are whole-wheat breads and pastas, oatmeal, brown rice, and bulgur  · Eat a variety of vegetables every day  Include dark, leafy greens such as spinach, kale, bebeto greens, and mustard greens  Eat yellow and orange vegetables such as carrots, sweet potatoes, and winter squash  · Eat a variety of fruits every day  Choose fresh or canned fruit (canned in its own juice or light syrup) instead of juice  Fruit juice has very little or no fiber  · Eat low-fat dairy foods  Drink fat-free (skim) milk or 1% milk  Eat fat-free yogurt and low-fat cottage cheese  Try low-fat cheeses such as mozzarella and other reduced-fat cheeses  · Choose meat and other protein foods that are low in fat  Choose beans or other legumes such as split peas or lentils  Choose fish, skinless poultry (chicken or turkey), or lean cuts of red meat (beef or pork)  Before you cook meat or poultry, cut off any visible fat  · Use less fat and oil  Try baking foods instead of frying them  Add less fat, such as margarine, sour cream, regular salad dressing and mayonnaise to foods  Eat fewer high-fat foods  Some examples of high-fat foods include french fries, doughnuts, ice cream, and cakes  · Eat fewer sweets  Limit foods and drinks that are high in sugar  This includes candy, cookies, regular soda, and sweetened drinks  Ways to decrease calories:   · Eat smaller portions  ? Use a small plate with smaller servings  ? Do not eat second helpings  ? When you eat at a restaurant, ask for a box and place half of your meal in the box before you eat  ? Share an entrée with someone else  · Replace high-calorie snacks with healthy, low-calorie snacks  ? Choose fresh fruit, vegetables, fat-free rice cakes, or air-popped popcorn instead of potato chips, nuts, or chocolate  ? Choose water or calorie-free drinks instead of soda or sweetened drinks      · Do not shop for groceries when you are hungry  You may be more likely to make unhealthy food choices  Take a grocery list of healthy foods and shop after you have eaten  · Eat regular meals  Do not skip meals  Skipping meals can lead to overeating later in the day  This can make it harder for you to lose weight  Eat a healthy snack in place of a meal if you do not have time to eat a regular meal  Talk with a dietitian to help you create a meal plan and schedule that is right for you  Other things to consider as you try to lose weight:   · Be aware of situations that may give you the urge to overeat, such as eating while watching television  Find ways to avoid these situations  For example, read a book, go for a walk, or do crafts  · Meet with a weight loss support group or friends who are also trying to lose weight  This may help you stay motivated to continue working on your weight loss goals  © Copyright [address] Information is for End User's use only and may not be sold, redistributed or otherwise used for commercial purposes  All illustrations and images included in CareNotes® are the copyrighted property of [employer]  or [address] [place] [address]  The above information is an  only  It is not intended as medical advice for individual conditions or treatments  Talk to your doctor, nurse or pharmacist before following any medical regimen to see if it is safe and effective for you  Low Fat Diet   AMBULATORY CARE:   A low-fat diet  is an eating plan that is low in total fat, unhealthy fat, and cholesterol  You may need to follow a low-fat diet if you have trouble digesting or absorbing fat  You may also need to follow this diet if you have high cholesterol  You can also lower your cholesterol by increasing the amount of fiber in your diet  Soluble fiber is a type of fiber that helps to decrease cholesterol levels  Different types of fat in food:   · Limit unhealthy fats    A diet that is high in cholesterol, saturated fat, and trans fat may cause unhealthy cholesterol levels  Unhealthy cholesterol levels increase your risk of heart disease  ? Cholesterol:  Limit intake of cholesterol to less than 200 mg per day  Cholesterol is found in meat, eggs, and dairy  ? Saturated fat:  Limit saturated fat to less than 7% of your total daily calories  Ask your dietitian how many calories you need each day  Saturated fat is found in butter, cheese, ice cream, whole milk, and palm oil  Saturated fat is also found in meat, such as beef, pork, chicken skin, and processed meats  Processed meats include sausage, hot dogs, and bologna  ? Trans fat:  Avoid trans fat as much as possible  Trans fat is used in fried and baked foods  Foods that say trans fat free on the label may still have up to 0 5 grams of trans fat per serving  · Include healthy fats  Replace foods that are high in saturated and trans fat with foods high in healthy fats  This may help to decrease high cholesterol levels  ? Monounsaturated fats: These are found in avocados, nuts, and vegetable oils, such as olive, canola, and sunflower oil  ? Polyunsaturated fats: These can be found in vegetable oils, such as soybean or corn oil  Omega-3 fats can help to decrease the risk of heart disease  Omega-3 fats are found in fish, such as salmon, herring, trout, and tuna  Omega-3 fats can also be found in plant foods, such as walnuts, flaxseed, soybeans, and canola oil  Foods to limit or avoid:   · Grains:      ? Snacks that are made with partially hydrogenated oils, such as chips, regular crackers, and butter-flavored popcorn    ? High-fat baked goods, such as biscuits, croissants, doughnuts, pies, cookies, and pastries    · Dairy:      ? Whole milk, 2% milk, and yogurt and ice cream made with whole milk    ?  Half and half creamer, heavy cream, and whipping cream    ? Cheese, cream cheese, and sour cream    · Meats and proteins:      ? High-fat cuts of meat (T-bone steak, regular hamburger, and ribs)    ? Fried meat, poultry (turkey and chicken), and fish    ? Poultry (chicken and turkey) with skin    ? Cold cuts (salami or bologna), hot dogs, whitten, and sausage    ? Whole eggs and egg yolks    · Vegetables and fruits with added fat:      ? Fried vegetables or vegetables in butter or high-fat sauces, such as cream or cheese sauces    ? Fried fruit or fruit served with butter or cream    · Fats:      ? Butter, stick margarine, and shortening    ? Coconut, palm oil, and palm kernel oil    Foods to include:   · Grains:      ? Whole-grain breads, cereals, pasta, and brown rice    ? Low-fat crackers and pretzels    · Vegetables and fruits:      ? Fresh, frozen, or canned vegetables (no salt or low-sodium)    ? Fresh, frozen, dried, or canned fruit (canned in light syrup or fruit juice)    ? Avocado    · Low-fat dairy products:      ? Nonfat (skim) or 1% milk    ? Nonfat or low-fat cheese, yogurt, and cottage cheese    · Meats and proteins:      ? Chicken or turkey with no skin    ? Baked or broiled fish    ? Lean beef and pork (loin, round, extra lean hamburger)    ? Beans and peas, unsalted nuts, soy products    ? Egg whites and substitutes    ? Seeds and nuts    · Fats:      ? Unsaturated oil, such as canola, olive, peanut, soybean, or sunflower oil    ? Soft or liquid margarine and vegetable oil spread    ? Low-fat salad dressing    Other ways to decrease fat:   · Read food labels before you buy foods  Choose foods that have less than 30% of calories from fat  Choose low-fat or fat-free dairy products  Remember that fat free does not mean calorie free  These foods still contain calories, and too many calories can lead to weight gain  · Trim fat from meat and avoid fried food  Trim all visible fat from meat before you cook it  Remove the skin from poultry  Do not graf meat, fish, or poultry   Bake, roast, boil, or broil these foods instead  Avoid fried foods  Eat a baked potato instead of [place] fries  Steam vegetables instead of sautéing them in butter  · Add less fat to foods  Use imitation whitten bits on salads and baked potatoes instead of regular whitten bits  Use fat-free or low-fat salad dressings instead of regular dressings  Use low-fat or nonfat butter-flavored topping instead of regular butter or margarine on popcorn and other foods  Ways to decrease fat in recipes:  Replace high-fat ingredients with low-fat or nonfat ones  This may cause baked goods to be drier than usual  You may need to use nonfat cooking spray on pans to prevent food from sticking  You also may need to change the amount of other ingredients, such as water, in the recipe  Try the following:  · Use low-fat or light margarine instead of regular margarine or shortening  · Use lean ground turkey breast or chicken, or lean ground beef (less than 5% fat) instead of hamburger  · Add 1 teaspoon of canola oil to 8 ounces of skim milk instead of using cream or half and half  · Use grated zucchini, carrots, or apples in breads instead of coconut  · Use blenderized, low-fat cottage cheese, plain tofu, or low-fat ricotta cheese instead of cream cheese  · Use 1 egg white and 1 teaspoon of canola oil, or use ¼ cup (2 ounces) of fat-free egg substitute instead of a whole egg  · Replace half of the oil that is called for in a recipe with applesauce when you bake  Use 3 tablespoons of cocoa powder and 1 tablespoon of canola oil instead of a square of baking chocolate  How to increase fiber:  Eat enough high-fiber foods to get 20 to 30 grams of fiber every day  Slowly increase your fiber intake to avoid stomach cramps, gas, and other problems  · Eat 3 ounces of whole-grain foods each day  An ounce is about 1 slice of bread  Eat whole-grain breads, such as whole-wheat bread   Whole wheat, whole-wheat flour, or other whole grains should be listed as the first ingredient on the food label  Replace white flour with whole-grain flour or use half of each in recipes  Whole-grain flour is heavier than white flour, so you may have to add more yeast or baking powder  · Eat a high-fiber cereal for breakfast   Oatmeal is a good source of soluble fiber  Look for cereals that have bran or fiber in the name  Choose whole-grain products, such as brown rice, barley, and whole-wheat pasta  · Eat more beans, peas, and lentils  For example, add beans to soups or salads  Eat at least 5 cups of fruits and vegetables each day  Eat fruits and vegetables with the peel because the peel is high in fiber  © Copyright [address] Information is for End User's use only and may not be sold, redistributed or otherwise used for commercial purposes  All illustrations and images included in CareNotes® are the copyrighted property of [employer]  or [address] [place] [address]  The above information is an  only  It is not intended as medical advice for individual conditions or treatments  Talk to your doctor, nurse or pharmacist before following any medical regimen to see if it is safe and effective for you  Heart Healthy Diet   AMBULATORY CARE:   A heart healthy diet  is an eating plan low in unhealthy fats and sodium (salt)  The plan is high in healthy fats and fiber  A heart healthy diet helps improve your cholesterol levels and lowers your risk for heart disease and stroke  A dietitian will teach you how to read and understand food labels  Heart healthy diet guidelines to follow:   · Choose foods that contain healthy fats  ? Unsaturated fats  include monounsaturated and polyunsaturated fats  Unsaturated fat is found in foods such as soybean, canola, olive, corn, and safflower oils  It is also found in soft tub margarine that is made with liquid vegetable oil      ? Omega-3 fat  is found in certain fish, such as salmon, tuna, and trout, and in walnuts and flaxseed  Eat fish high in omega-3 fats at least 2 times a week  · Get 20 to 30 grams of fiber each day  Fruits, vegetables, whole-grain foods, and legumes (cooked beans) are good sources of fiber  · Limit or do not have unhealthy fats  ? Cholesterol  is found in animal foods, such as eggs and lobster, and in dairy products made from whole milk  Limit cholesterol to less than 200 mg each day  ? Saturated fat  is found in meats, such as whitten and hamburger  It is also found in chicken or turkey skin, whole milk, and butter  Limit saturated fat to less than 7% of your total daily calories  ? Trans fat  is found in packaged foods, such as potato chips and cookies  It is also in hard margarine, some fried foods, and shortening  Do not eat foods that contain trans fats  · Limit sodium as directed  You may be told to limit sodium to 2,000 to 2,300 mg each day  Choose low-sodium or no-salt-added foods  Add little or no salt to food you prepare  Use herbs and spices in place of salt  Include the following in your heart healthy plan:  Ask your dietitian or healthcare provider how many servings to have from each of the following food groups:  · Grains:      ? Whole-wheat breads, cereals, and pastas, and brown rice    ? Low-fat, low-sodium crackers and chips    · Vegetables:      ? Broccoli, green beans, green peas, and spinach    ? Collards, kale, and lima beans    ? Carrots, sweet potatoes, tomatoes, and peppers    ? Canned vegetables with no salt added    · Fruits:      ? Bananas, peaches, pears, and pineapple    ? Grapes, raisins, and dates    ? Oranges, tangerines, grapefruit, orange juice, and grapefruit juice    ? Apricots, mangoes, melons, and papaya    ? Raspberries and strawberries    ? Canned fruit with no added sugar    · Low-fat dairy:      ? Nonfat (skim) milk, 1% milk, and low-fat almond, cashew, or soy milks fortified with calcium    ?  Low-fat cheese, regular or frozen yogurt, and cottage cheese    · Meats and proteins:      ? Lean cuts of beef and pork (loin, leg, round), skinless chicken and turkey    ? Legumes, soy products, egg whites, or nuts    Limit or do not include the following in your heart healthy plan:   · Unhealthy fats and oils:      ? Whole or 2% milk, cream cheese, sour cream, or cheese    ? High-fat cuts of beef (T-bone steaks, ribs), chicken or turkey with skin, and organ meats such as liver    ? Butter, stick margarine, shortening, and cooking oils such as coconut or palm oil    · Foods and liquids high in sodium:      ? Packaged foods, such as frozen dinners, cookies, macaroni and cheese, and cereals with more than 300 mg of sodium per serving    ? Vegetables with added sodium, such as instant potatoes, vegetables with added sauces, or regular canned vegetables    ? Cured or smoked meats, such as hot dogs, whitten, and sausage    ? High-sodium ketchup, barbecue sauce, salad dressing, pickles, olives, soy sauce, or miso    · Foods and liquids high in sugar:      ? Candy, cake, cookies, pies, or doughnuts    ? Soft drinks (soda), sports drinks, or sweetened tea    ? Canned or dry mixes for cakes, soups, sauces, or gravies    Other healthy heart guidelines:   · Do not smoke  Nicotine and other chemicals in cigarettes and cigars can cause lung and heart damage  Ask your healthcare provider for information if you currently smoke and need help to quit  E-cigarettes or smokeless tobacco still contain nicotine  Talk to your healthcare provider before you use these products  · Limit or do not drink alcohol as directed  Alcohol can damage your heart and raise your blood pressure  Your healthcare provider may give you specific daily and weekly limits  The general recommended limit is 1 drink a day for women 21 or older and for men 72 or older  Do not have more than 3 drinks in a day or 7 in a week   The recommended limit is 2 drinks a day for men 21 to 64 years of age  Ruddy Covert not have more than 4 drinks in a day or 14 in a week  A drink of alcohol is 12 ounces of beer, 5 ounces of wine, or 1½ ounces of liquor  · Exercise regularly  Exercise can help you maintain a healthy weight and improve your blood pressure and cholesterol levels  Regular exercise can also decrease your risk for heart problems  Ask your healthcare provider about the best exercise plan for you  Do not start an exercise program without asking your healthcare provider  Follow up with your doctor or cardiologist as directed:  Write down your questions so you remember to ask them during your visits  © Copyright [address] Information is for End User's use only and may not be sold, redistributed or otherwise used for commercial purposes  All illustrations and images included in CareNotes® are the copyrighted property of A [employer] A M , Inc  or [facility] Brenden gisele   The above information is an  only  It is not intended as medical advice for individual conditions or treatments  Talk to your doctor, nurse or pharmacist before following any medical regimen to see if it is safe and effective for you  Calorie Counting Diet   WHAT YOU NEED TO KNOW:   What is a calorie counting diet? It is a meal plan based on counting calories each day to reach a healthy body weight  You will need to eat fewer calories if you are trying to lose weight  Weight loss may decrease your risk for certain health problems or improve your health if you have health problems  Some of these health problems include heart disease, high blood pressure, and diabetes  What foods should I avoid? Your dietitian will tell you if you need to avoid certain foods based on your body weight and health condition  You may need to avoid high-fat foods if you are at risk for or have heart disease  You may need to eat fewer foods from the breads and starches food group if you have diabetes  How many calories are in foods?   The following is a list of foods and drinks with the approximate number of calories in each  Check the food label to find the exact number of calories  A dietitian can tell you how many calories you should have from each food group each day  · Carbohydrate:      ? ½ of a 3-inch bagel, 1 slice of bread, or ½ of a hamburger bun or hot dog bun (80)    ? 1 (8-inch) flour tortilla or ½ cup of cooked rice (100)    ? 1 (6-inch) corn tortilla (80)    ? 1 (6-inch) pancake or 1 cup of bran flakes cereal (110)    ? ½ cup of cooked cereal (80)    ? ½ cup of cooked pasta (85)    ? 1 ounce of pretzels (100)    ? 3 cups of air-popped popcorn without butter or oil (80)    · Dairy:      ? 1 cup of skim or 1% milk (90)    ? 1 cup of 2% milk (120)    ? 1 cup of whole milk (160)    ? 1 cup of 2% chocolate milk (220)    ? 1 ounce of low-fat cheese with 3 grams of fat per ounce (70)    ? 1 ounce of cheddar cheese (114)    ? ½ cup of 1% fat cottage cheese (80)    ? 1 cup of plain or sugar-free, fat-free yogurt (90)    · Protein foods:      ? 3 ounces of fish (not breaded or fried) (95)    ? 3 ounces of breaded, fried fish (195)    ? ¾ cup of tuna canned in water (105)    ? 3 ounces of chicken breast without skin (105)    ? 1 fried chicken breast with skin (350)    ? ¼ cup of fat free egg substitute (40)    ? 1 large egg (75)    ? 3 ounces of lean beef or pork (165)    ? 3 ounces of fried pork chop or ham (185)    ? ½ cup of cooked dried beans, such as kidney, price, lentils, or navy (115)    ? 3 ounces of bologna or lunch meat (225)    ? 2 links of breakfast sausage (140)    · Vegetables:      ? ½ cup of sliced mushrooms (10)    ? 1 cup of salad greens, such as lettuce, spinach, or adelaida (15)    ? ½ cup of steamed asparagus (20)    ? ½ cup of cooked summer squash, zucchini squash, or green or wax beans (25)    ? 1 cup of broccoli or cauliflower florets, or 1 medium tomato (25)    ? 1 large raw carrot or ½ cup of cooked carrots (40)    ? ? of a medium cucumber or 1 stalk of celery (5)    ? 1 small baked potato (160)    ? 1 cup of breaded, fried vegetables (230)    · Fruit:      ? 1 (6-inch) banana (55)     ? ½ of a 4-inch grapefruit (55)    ? 15 grapes (60)    ? 1 medium orange or apple (70)    ? 1 large peach (65)    ? 1 cup of fresh pineapple chunks (75)    ? 1 cup of melon cubes (50)    ? 1¼ cups of whole strawberries (45)    ? ½ cup of fruit canned in juice (55)    ? ½ cup of fruit canned in heavy syrup (110)    ? ? cup of raisins (130)    ? ½ cup of unsweetened fruit juice (60)    ? ½ cup of grape, cranberry, or prune juice (90)    · Fat:      ? 10 peanuts or 2 teaspoons of peanut butter (55)    ? 2 tablespoons of avocado or 1 tablespoon of regular salad dressing (45)    ? 2 slices of whitten (90)    ? 1 teaspoon of oil, such as safflower, canola, corn, or olive oil (45)    ? 2 teaspoons of low-fat margarine, or 1 tablespoon of low-fat mayonnaise (50)    ? 1 teaspoon of regular margarine (40)    ? 1 tablespoon of regular mayonnaise (135)    ? 1 tablespoon of cream cheese or 2 tablespoons of low-fat cream cheese (45)    ? 2 tablespoons of vegetable shortening (215)    · Dessert and sweets:      ? 8 animal crackers or 5 vanilla wafers (80)    ? 1 frozen fruit juice bar (80)    ? ½ cup of ice milk or low-fat frozen yogurt (90)    ? ½ cup of sherbet or sorbet (125)    ? ½ cup of sugar-free pudding or custard (60)    ? ½ cup of ice cream (140)    ? ½ cup of pudding or custard (175)    ? 1 (2-inch) square chocolate brownie (185)    · Combination foods:      ? Bean burrito made with an 8-inch tortilla, without cheese (275)    ? Chicken breast sandwich with lettuce and tomato (325)    ? 1 cup of chicken noodle soup (60)    ? 1 beef taco (175)    ? Regular hamburger with lettuce and tomato (310)    ? Regular cheeseburger with lettuce and tomato (410)     ? ¼ of a 12-inch cheese pizza (280)    ? Fried fish sandwich with lettuce and tomato (425)    ?  Hot dog and bun (275)    ? 1½ cups of macaroni and cheese (310)    ? Taco salad with a fried tortilla shell (870)    · Low-calorie foods:      ? 1 tablespoon of ketchup or 1 tablespoon of fat free sour cream (15)    ? 1 teaspoon of mustard (5)    ? ¼ cup of salsa (20)    ? 1 large dill pickle (15)    ? 1 tablespoon of fat free salad dressing (10)    ? 2 teaspoons of low-sugar, light jam or jelly, or 1 tablespoon of sugar-free syrup (15)    ? 1 sugar-free popsicle (15)    ? 1 cup of club soda, seltzer water, or diet soda (0)    CARE AGREEMENT:   You have the right to help plan your care  Discuss treatment options with your healthcare provider to decide what care you want to receive  You always have the right to refuse treatment  The above information is an  only  It is not intended as medical advice for individual conditions or treatments  Talk to your doctor, nurse or pharmacist before following any medical regimen to see if it is safe and effective for you  © Copyright [address] Information is for End User's use only and may not be sold, redistributed or otherwise used for commercial purposes   All illustrations and images included in CareNotes® are the copyrighted property of [employer]  or [address] [place] [address]

## 2021-03-27 ENCOUNTER — IMMUNIZATIONS (OUTPATIENT)
Dept: FAMILY MEDICINE CLINIC | Facility: HOSPITAL | Age: 61
End: 2021-03-27

## 2021-03-27 DIAGNOSIS — Z23 ENCOUNTER FOR IMMUNIZATION: Primary | ICD-10-CM

## 2021-03-27 PROCEDURE — 0001A SARS-COV-2 / COVID-19 MRNA VACCINE (PFIZER-BIONTECH) 30 MCG: CPT

## 2021-03-27 PROCEDURE — 91300 SARS-COV-2 / COVID-19 MRNA VACCINE (PFIZER-BIONTECH) 30 MCG: CPT

## 2021-04-17 ENCOUNTER — IMMUNIZATIONS (OUTPATIENT)
Dept: FAMILY MEDICINE CLINIC | Facility: HOSPITAL | Age: 61
End: 2021-04-17

## 2021-04-17 DIAGNOSIS — Z23 ENCOUNTER FOR IMMUNIZATION: Primary | ICD-10-CM

## 2021-04-17 PROCEDURE — 91300 SARS-COV-2 / COVID-19 MRNA VACCINE (PFIZER-BIONTECH) 30 MCG: CPT

## 2021-04-17 PROCEDURE — 0002A SARS-COV-2 / COVID-19 MRNA VACCINE (PFIZER-BIONTECH) 30 MCG: CPT

## 2021-05-16 DIAGNOSIS — M79.89 LEG SWELLING: ICD-10-CM

## 2021-05-17 RX ORDER — FUROSEMIDE 20 MG/1
TABLET ORAL
Qty: 30 TABLET | Refills: 0 | Status: SHIPPED | OUTPATIENT
Start: 2021-05-17 | End: 2021-09-20

## 2021-08-17 ENCOUNTER — TELEMEDICINE (OUTPATIENT)
Dept: INTERNAL MEDICINE CLINIC | Facility: CLINIC | Age: 61
End: 2021-08-17
Payer: COMMERCIAL

## 2021-08-17 DIAGNOSIS — R05.9 COUGH: Primary | ICD-10-CM

## 2021-08-17 PROCEDURE — G2012 BRIEF CHECK IN BY MD/QHP: HCPCS | Performed by: INTERNAL MEDICINE

## 2021-08-17 NOTE — PROGRESS NOTES
COVID-19 Outpatient Progress Note    Assessment/Plan:    Problem List Items Addressed This Visit     None      Visit Diagnoses     Cough    -  Primary    Relevant Orders    Novel Coronavirus (Covid-19),PCR [facility] - Collected at [facility] or [facility]         Disposition:     I recommended self-quarantine for 10 days and to watch for symptoms until 14 days after exposure  If patient were to develop symptoms, they should self isolate and call our office for further guidance  Patient has been fully vaccinated against COVID-19 and has come in close contact with a positive COVID-19 person  It was recommended that they get tested in 3-5 days after the date of last exposure and wear a mask in public indoor settings for 14 days after exposure or until a negative test result  I referred patient to a [facility] for further evaluation  I have spent 10 minutes directly with the patient  Greater than 50% of this time was spent in counseling/coordination of care regarding: instructions for management, patient and family education and impressions  Verification of patient location:    Patient is located in the following state in which I hold an active license [place]    Encounter provider Lisa Obregon MD    Provider located at [address] [place] [address] [address] [place] [address]  [phone]    Recent Visits  No visits were found meeting these conditions  Showing recent visits within past 7 days and meeting all other requirements  Today's Visits  Date Type Provider Dept   08/17/21 Telemedicine Traci Arceo MD  Internal [facility]   Showing today's visits and meeting all other requirements  Future Appointments  No visits were found meeting these conditions    Showing future appointments within next 150 days and meeting all other requirements       Patient agrees to participate in a virtual check in via telephone or video visit instead of presenting to the office to address urgent/immediate medical needs  Patient is aware this is a billable service  After connecting through [facility], the patient was identified by name and date of birth  Helene Wagoner was informed that this was a telemedicine visit and that the exam was being conducted confidentially over secure lines  My office door was closed  Helene Wagoner acknowledged consent and understanding of privacy and security of the telemedicine visit  I informed the patient that I have reviewed her record in Epic and presented the opportunity for her to ask any questions regarding the visit today  The patient agreed to participate  Subjective:   Helene Wagoner is a 27[address] y o  female who is concerned about COVID-19  Patient's symptoms include chills, fatigue, loss of taste, cough, shortness of breath, diarrhea and headache  Patient denies fever, abdominal pain, nausea and vomiting  Date of symptom onset: 8/14/2021  Date of exposure: 8/11/2021  COVID-19 vaccination status: Fully vaccinated    Exposure:   Contact with a person who is under investigation (PUI) for or who is positive for COVID-19 within the last 14 days?: Yes    Hospitalized recently for fever and/or lower respiratory symptoms?: No      Currently a healthcare worker that is involved in direct patient care?: No      Works in a special setting where the risk of COVID-19 transmission may be high? (this may include long-term care, correctional and residential facilities; homeless shelters; assisted-living facilities and group homes ): No      Resident in a special setting where the risk of COVID-19 transmission may be high? 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 about a week ago    She was tested for COVID-19 on same day, however, she turned out to be negative  And was asymptomatic at the time  She reports that her symptoms began about 4 days ago when she has been experiencing progressive worsening of the chills, cough, shortness of breath, fatigue, headache and loss of taste  She also complained of diarrhea and has had about 4 bowel movements  Fortunately, she denies any nausea vomiting and has been able to tolerate oral diet  She has a pulse oximeter at home and reports that her oxygen level has been around 92-93%  No results found for: Swetha Calabrese, [address] [facility] [facility], [address] [facility], [place] [address]  Past Medical History:   Diagnosis Date    Diabetes mellitus ([address] )     Hypertension      Past Surgical History:   Procedure Laterality Date    BREAST BIOPSY Right 2016    Done at [address] [place] [address]       Current Outpatient Medications   Medication Sig Dispense Refill    amLODIPine-benazepril (LOTREL) 5-40 MG per capsule Take 1 capsule by mouth daily 30 capsule 3    atorvastatin (LIPITOR) 40 mg tablet Take 1 tablet (40 mg total) by mouth daily after dinner 30 tablet 3    cholecalciferol (VITAMIN D3) 1,000 units tablet Take 1,000 Units by mouth daily      furosemide (LASIX) 20 mg tablet take 1 tablet by mouth once daily if needed for SWELLING 30 tablet 0    metFORMIN (GLUCOPHAGE) 500 mg tablet Take 2 tablets (1,000 mg total) by mouth 2 (two) times a day with meals 120 tablet 3    potassium chloride (K-DUR,KLOR-CON) 20 mEq tablet Take 1 tablet (20 mEq total) by mouth as needed (Take 1 tablet only when you take lasix) 30 tablet 5     No current facility-administered medications for this visit  No Known Allergies    Review of Systems   Constitutional: Positive for chills and fatigue  Negative for appetite change, diaphoresis and fever  Respiratory: Positive for cough and shortness of breath  Cardiovascular: Negative for chest pain, palpitations and leg swelling  Gastrointestinal: Positive for diarrhea   Negative for abdominal distention, abdominal pain, nausea and vomiting  Genitourinary: Negative for difficulty urinating and dysuria  Musculoskeletal: Negative for arthralgias and back pain  Neurological: Positive for headaches  Negative for dizziness, weakness, light-headedness and numbness  Objective: There were no vitals filed for this visit  Physical Exam  It was my intent to perform this visit via video technology but the patient was not able to do a video connection so the visit was completed via audio telephone only  On my phone call with the patient, she did not sound to be in any respiratory distress  Was able to complete her sentences without much difficulty  Physical exam limited due to mode of visit  VIRTUAL VISIT DISCLAIMER    Frida Done verbally agrees to participate in [employer]  Pt is aware that [employer] could be limited without vital signs or the ability to perform a full hands-on physical Porfirio Toth understands she or the provider may request at any time to terminate the video visit and request the patient to seek care or treatment in person

## 2021-08-17 NOTE — PROGRESS NOTES
Virtual Brief Visit    Verification of patient location:    Patient is located in the following state in which I hold an active license { amb virtual patient location:[address]}      Assessment/Plan:    Problem List Items Addressed This Visit     None                Reason for visit is   Chief Complaint   Patient presents with    Virtual Brief Visit        Encounter provider Juve Goodwin MD    Provider located at [address] [facility] [place] Renate Fajardo [address] [facility]  [phone]    Recent Visits  No visits were found meeting these conditions  Showing recent visits within past 7 days and meeting all other requirements  Today's Visits  Date Type Provider Dept   08/17/21 Telemedicine Abdelrahman Ferreira MD  [facility]   Showing today's visits and meeting all other requirements  Future Appointments  No visits were found meeting these conditions  Showing future appointments within next 150 days and meeting all other requirements       After connecting through {AMB VIRTUAL VISIT SMXARP:83228}, the patient was identified by name and date of birth  Scott Patel was informed that this is a telemedicine visit and that the visit is being conducted through {AMB VIRTUAL VISIT LOBFRP:50471}  {Telemedicine confidentiality :76379} {Telemedicine participants:57864}  She acknowledged consent and understanding of privacy and security of the platform  The patient has agreed to participate and understands she can discontinue the visit at any time  Patient is aware this is a billable service  Zoë Skaggs is a 62 y o  female ***    HPI     Past Medical History:   Diagnosis Date    Diabetes mellitus ([facility] [address] )     Hypertension        Past Surgical History:   Procedure Laterality Date    BREAST BIOPSY Right 2016    Done at [address] [place] [address] EXTRACTION         Current Outpatient Medications   Medication Sig Dispense Refill    amLODIPine-benazepril (LOTREL) 5-40 MG per capsule Take 1 capsule by mouth daily 30 capsule 3    atorvastatin (LIPITOR) 40 mg tablet Take 1 tablet (40 mg total) by mouth daily after dinner 30 tablet 3    cholecalciferol (VITAMIN D3) 1,000 units tablet Take 1,000 Units by mouth daily      furosemide (LASIX) 20 mg tablet take 1 tablet by mouth once daily if needed for SWELLING 30 tablet 0    metFORMIN (GLUCOPHAGE) 500 mg tablet Take 2 tablets (1,000 mg total) by mouth 2 (two) times a day with meals 120 tablet 3    potassium chloride (K-DUR,KLOR-CON) 20 mEq tablet Take 1 tablet (20 mEq total) by mouth as needed (Take 1 tablet only when you take lasix) 30 tablet 5     No current facility-administered medications for this visit  No Known Allergies    Review of Systems    There were no vitals filed for this visit  {Time Spent:88777}    VIRTUAL VISIT DISCLAIMER      Nicole Shanda verbally agrees to participate in [employer]  Pt is aware that [employer] could be limited without vital signs or the ability to perform a full hands-on physical Silvianoolivia Gordon understands she or the provider may request at any time to terminate the video visit and request the patient to seek care or treatment in person

## 2021-08-18 ENCOUNTER — TELEPHONE (OUTPATIENT)
Dept: INTERNAL MEDICINE CLINIC | Facility: CLINIC | Age: 61
End: 2021-08-18

## 2021-08-18 ENCOUNTER — TELEMEDICINE (OUTPATIENT)
Dept: INTERNAL MEDICINE CLINIC | Facility: CLINIC | Age: 61
End: 2021-08-18
Payer: COMMERCIAL

## 2021-08-18 DIAGNOSIS — E66.01 MORBID OBESITY (HCC): ICD-10-CM

## 2021-08-18 DIAGNOSIS — U07.1 COVID-19: Primary | ICD-10-CM

## 2021-08-18 PROCEDURE — 99214 OFFICE O/P EST MOD 30 MIN: CPT | Performed by: HOSPITALIST

## 2021-08-18 RX ORDER — ALBUTEROL SULFATE 90 UG/1
3 AEROSOL, METERED RESPIRATORY (INHALATION) ONCE AS NEEDED
Status: CANCELLED | OUTPATIENT
Start: 2021-08-18

## 2021-08-18 RX ORDER — ACETAMINOPHEN 325 MG/1
650 TABLET ORAL ONCE AS NEEDED
Status: CANCELLED | OUTPATIENT
Start: 2021-08-18

## 2021-08-18 RX ORDER — SODIUM CHLORIDE 9 MG/ML
20 INJECTION, SOLUTION INTRAVENOUS ONCE
Status: CANCELLED | OUTPATIENT
Start: 2021-08-18

## 2021-08-18 RX ORDER — ONDANSETRON 2 MG/ML
4 INJECTION INTRAMUSCULAR; INTRAVENOUS ONCE AS NEEDED
Status: CANCELLED | OUTPATIENT
Start: 2021-08-18

## 2021-08-18 NOTE — TELEPHONE ENCOUNTER
Isabella Smallwood had left a message to the  office wishing to be called back  Called Emmanuel Briones who requested covid testing to be done for his family and himself  *  Jerry wished to know if there is a medication, Ana can take over going for an infusion on 08/20/21  Emmanuel Briones stated this is due to Ana being the caretaker of Migdalia Singleton, who is paralyzed from the neck down, and that there is no one else to take care of him  *More information regarding Jerry's covid testing request can be found in [facility] chart

## 2021-08-18 NOTE — PROGRESS NOTES
COVID-19 Outpatient Progress Note    Assessment/Plan:    Problem List Items Addressed This Visit     None         Disposition:     I recommended continued isolation until at least 24 hours have passed since recovery defined as resolution of fever without the use of fever-reducing medications AND improvement in COVID symptoms AND 10 days have passed since onset of symptoms (or 10 days have passed since date of first positive viral diagnostic test for asymptomatic patients)  Patient is at increased risk of progressing towards severe COVID-19 due to the following high risk criteria:   - Obesity or being overweight  - Diabetes  - Cardiovascular disease    Patient meets criteria for Casirivimab/Imdevimab administration for the treatment of COVID-19  They were counseled in regards to risks, benefits, and side effects of this infusion  Casirivimab and imdevimab are investigational medicines used to treat mild to moderate symptoms of COVID-19 in non-hospitalized adults and adolescents (15years of age and older who weigh at least 80 pounds (40 kg)), and who are at high risk for developing severe COVID-19 symptoms or the need for hospitalization  Casirivimab and imdevimab are investigational because they are still being studied  There is limited information known about the safety and effectiveness of using casirivimab and imdevimab to treat people with COVID-19  The FDA has authorized the emergency use of casirivimab and imdevimab for the treatment of COVID-19 under an Emergency Use Authorization (EUA)  Possible side effects of casirivimab and imdevimab: Allergic reactions can happen during and after infusion with casirivimab and imdevimab  Possible reactions include: fever, chills, nausea, headache, shortness of breath, low blood pressure, wheezing, swelling of your lips, face, or throat, rash including hives, itching, muscle aches, and dizziness      The side effects of getting any medicine by vein may include brief pain, bleeding, bruising of the skin, soreness, swelling, and possible infection at the infusion site  These are not all the possible side effects of casirivimab and imdevimab  Not a lot of people have been given casirivimab and imdevimab  Serious and unexpected side effects may happen  Casirivimab and imdevimab are still being studied so it is possible that all of the risks are not known at this time  It is possible that casirivimab and imdevimab could interfere with your body's own ability to fight off a future infection of SARS-CoV-2  Similarly, casirivimab and imdevimab may reduce your body's immune response to a vaccine for SARS-CoV-2  Specific studies have not been conducted to address these possible risks  Emergency Use Authorization:    The [facility] FDA has made casirivimab and imdevimab available under an emergency access mechanism called an EUA  The EUA is supported by a  of Health and Human Service (HHS) declaration that circumstances exist to justify the emergency use of drugs and biological products during the COVID-19 pandemic  Casirivimab and imdevimab have not undergone the same type of review as an FDA-approved or cleared product  The FDA may issue an EUA when certain criteria are met, which includes that there are no adequate, approved, available alternatives  In addition, the FDA decision is based on the totality of scientific evidence available showing that it is reasonable to believe that the product meets certain criteria for safety, performance, and labeling and may be effective in treatment of patients during the COVID-19 pandemic  All of these criteria must be met to allow for the product to be used in the treatment of patients during the COVID-19 pandemic       The EUA for casirivimab and imdevimab is in effect for the duration of the COVID-19 declaration justifying emergency use of these products, unless terminated or revoked (after which the products may no longer be used)  Regarding COVID-19 Vaccination:    Currently there is no data or safety or efficacy of COVID-19 vaccination in persons who received monoclonal antibodies as part of COVID-19 treatment  Treatment should be deferred for at least 90 days to avoid interference of the treatment with vaccine-induced immune responses (this is based on estimated half-life of therapies and evidence suggesting reinfection is uncommon within 90 days of initial infection)  The patient consents to proceed with casirivimab and imdevimab administration  I have spent 20 minutes directly with the patient  Greater than 50% of this time was spent in counseling/coordination of care regarding: diagnostic results, risks and benefits of treatment options, instructions for management, patient and family education and risk factor reductions  She qualifies for monoclonal antibody treatment based on COVID-19 diagnosis, symptom onset less than 7 days and BMI greater than 50  Verification of patient location:    Patient is located in the following state in which I hold an active license [place]    Encounter provider Lisa Obregon MD    Provider located at [address] [place] [id number] [phone]    Recent Visits  Date Type Provider Dept   08/17/21 Telemedicine Traci Arceo MD [facility] recent visits within past 7 days and meeting all other requirements  Today's Visits  Date Type Provider Dept   08/18/21 Telemedicine Traci Arceo MD [facility]   Showing today's visits and meeting all other requirements  Future Appointments  No visits were found meeting these conditions  Showing future appointments within next 150 days and meeting all other requirements     This virtual check-in was done via [employer] and patient was informed that this is a secure, HIPAA-compliant platform  She agrees to proceed      Patient agrees to participate in a virtual check in via telephone or video visit instead of presenting to the office to address urgent/immediate medical needs  Patient is aware this is a billable service  After connecting through [facility], the patient was identified by name and date of birth  Mkie Stroud was informed that this was a telemedicine visit and that the exam was being conducted confidentially over secure lines  Mike Stroud acknowledged consent and understanding of privacy and security of the telemedicine visit  I informed the patient that I have reviewed her record in Epic and presented the opportunity for her to ask any questions regarding the visit today  The patient agreed to participate  Subjective:   Mike Stroud is a 62 y o  female who has been screened for COVID-19  Symptom change since last report: unchanged  Patient's symptoms include chills, fatigue, nasal congestion, anosmia, loss of taste, cough and myalgias  Patient denies fever, shortness of breath, abdominal pain, nausea, vomiting, diarrhea and headaches  Date of symptom onset: 8/14/2021  Date of positive COVID-19 PCR: 8/17/2021  COVID-19 vaccination status: Fully vaccinated    Kym Mckenzie has been staying home and has isolated themselves in her home  She is taking care to not share personal items and is cleaning all surfaces that are touched often, like counters, tabletops, and doorknobs using household cleaning sprays or wipes  She is wearing a mask when she leaves her room  Symptoms last p r n  change  However, diarrhea has improved    She is trying to stay hydrated    No results found for: [facility], [address] [facility], [place] [address]  Past Medical History:   Diagnosis Date    Diabetes mellitus ([address] )     Hypertension      Past Surgical History:   Procedure Laterality Date    BREAST BIOPSY Right 2016    Done at [address] [facility]       Current Outpatient Medications   Medication Sig Dispense Refill    amLODIPine-benazepril (LOTREL) 5-40 MG per capsule Take 1 capsule by mouth daily 30 capsule 3    atorvastatin (LIPITOR) 40 mg tablet Take 1 tablet (40 mg total) by mouth daily after dinner 30 tablet 3    cholecalciferol (VITAMIN D3) 1,000 units tablet Take 1,000 Units by mouth daily      furosemide (LASIX) 20 mg tablet take 1 tablet by mouth once daily if needed for SWELLING 30 tablet 0    metFORMIN (GLUCOPHAGE) 500 mg tablet Take 2 tablets (1,000 mg total) by mouth 2 (two) times a day with meals 120 tablet 3    potassium chloride (K-DUR,KLOR-CON) 20 mEq tablet Take 1 tablet (20 mEq total) by mouth as needed (Take 1 tablet only when you take lasix) 30 tablet 5     No current facility-administered medications for this visit  No Known Allergies    Review of Systems   Constitutional: Positive for chills and fatigue  Negative for appetite change, diaphoresis and fever  HENT: Positive for congestion  Respiratory: Positive for cough  Negative for shortness of breath  Cardiovascular: Negative for chest pain, palpitations and leg swelling  Gastrointestinal: Negative for abdominal distention, abdominal pain, diarrhea, nausea and vomiting  Genitourinary: Negative for difficulty urinating and dysuria  Musculoskeletal: Positive for myalgias  Negative for arthralgias and back pain  Neurological: Negative for dizziness, weakness, light-headedness, numbness and headaches  Objective: There were no vitals filed for this visit  Physical Exam  Constitutional:       General: She is not in acute distress  Appearance: Normal appearance  She is obese  She is ill-appearing  Eyes:      Extraocular Movements: Extraocular movements intact  Conjunctiva/sclera: Conjunctivae normal    Pulmonary:      Effort: Pulmonary effort is normal          VIRTUAL VISIT DISCLAIMER    Shayna Foley verbally agrees to participate in [employer]   Pt is aware that [facility] could be limited without vital signs or the ability to perform a full hands-on physical Radha Cornea understands she or the provider may request at any time to terminate the video visit and request the patient to seek care or treatment in person

## 2021-08-19 ENCOUNTER — TELEMEDICINE (OUTPATIENT)
Dept: INTERNAL MEDICINE CLINIC | Facility: CLINIC | Age: 61
End: 2021-08-19
Payer: COMMERCIAL

## 2021-08-19 DIAGNOSIS — U07.1 COVID-19: Primary | ICD-10-CM

## 2021-08-19 DIAGNOSIS — U07.1 COVID-19 VIRUS INFECTION: ICD-10-CM

## 2021-08-19 PROCEDURE — 99213 OFFICE O/P EST LOW 20 MIN: CPT | Performed by: HOSPITALIST

## 2021-08-19 RX ORDER — ZINC SULFATE 50(220)MG
220 CAPSULE ORAL DAILY
Qty: 7 CAPSULE | Refills: 0 | Status: SHIPPED | OUTPATIENT
Start: 2021-08-19 | End: 2021-09-14 | Stop reason: ALTCHOICE

## 2021-08-19 RX ORDER — ALBUTEROL SULFATE 90 UG/1
2 AEROSOL, METERED RESPIRATORY (INHALATION) EVERY 6 HOURS PRN
Qty: 8.5 G | Refills: 0 | Status: SHIPPED | OUTPATIENT
Start: 2021-08-19 | End: 2021-10-04

## 2021-08-19 RX ORDER — MELATONIN
2000 DAILY
Qty: 14 TABLET | Refills: 0 | Status: SHIPPED | OUTPATIENT
Start: 2021-08-19 | End: 2021-08-26

## 2021-08-19 RX ORDER — MULTIVIT WITH MINERALS/LUTEIN
1000 TABLET ORAL DAILY
Qty: 7 TABLET | Refills: 0 | Status: SHIPPED | OUTPATIENT
Start: 2021-08-19 | End: 2021-10-04

## 2021-08-19 NOTE — PROGRESS NOTES
Patient Name: Trina Hall     : 1960     MRN: [id number]    Assessment/Plan:    Problem List Items Addressed This Visit        Other    [id number] - Primary       Patient symptoms started on 2021,  Patient was [facility] on 2021, no  Copy of official results  (+) headache,  Body ache, fog, chills, sweats, shortness of breath with exertion, mild diarrhea, mild abdominal pain     patient concerned of  Patient son care, while getting infusion, used to have visiting nurses and home health however has stop coming in due to COVID positive results, will have difficulty going to the infusion center       plan:  ·  discussed with the patient, that nurse in the office, will try to find solution to help her get caretaker while she is going to the transfusion center, the nurse will call her back if she finds a solution  ·  will place the patient on p r n  albuterol for shortness of breath during exertion, especially when she is 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  ·  patient agreeable to start on vitamin-C, vitamin-D, and zinc for 7 days and can moved to multivitamin, discussed with the patient that currently this is not official  Regarding treatment guidelines right now but was used previously  ·  continue Tylenol p r n   ·  keep hydrated   ·  will follow-up tomorrow due to some shortness of breath on exertion, if no improvement will consider referral the patient to respiratory clinic or Consider starting the patient on p o  steroids   ·  patient is due for antibodies on 2021  ·  follow CDC guideline regarding COVID-19 precautions  ·  discussed with the patient to use pulse oximetry  As needed with SOB, if oxygen level is less than 90% during rest to call 911 and to let them know that she is COVID positive  ·  patient can also call our office with shortness of breath and we can refer her to respiratory clinic           Relevant Medications    Ascorbic Acid (vitamin C) 1000 MG tablet    cholecalciferol (VITAMIN D3) 1,000 units tablet    zinc sulfate (ZINCATE) 220 mg capsule    albuterol (ProAir HFA) 90 mcg/act inhaler      Other Visit Diagnoses     COVID-19 virus infection            It was my intent to perform this visit via video technology but the patient was not able to do a video connection so the visit was completed via audio telephone only  Discussion:     Other management recommendations:     Dyspnea & cough   Recommend inhaled bronchodilator as prescribed      Fatigue, poor endurance, and functional status:   Encouraged adequate rest, proper hydration/nutrition, and good sleep hygiene  Economic and social concerns:   Reviewed patient's economic and social concerns  I spent 15 minutes directly with the patient during this visit     Subjective:    COVID-19 Infection:  Date of symptom onset: 8/14/2021  Date of positive test: 8/17/2021    Initial symptoms with acute illness:  Initial symptoms included: chills, cough, shortness of breath, nasal congestion, diarrhea, abdominal pain, headache, loss of smell and loss of taste  New or persistent symptoms:  Patient complains of: shortness of breath and headache  Patient rates severity of current symptoms as mild  Inpatient treatment:      Was patient hospitalized?: No       Patient is a 78-year-old with a past medical history of diabetes, hypertension,  [facility]     call for follow-up of symptoms, patient reports having headache, chills, sweats, cough nonproductive, reports shortness of breath on exertion  Mild diarrhea  Review of Systems   Respiratory: Positive for shortness of breath  Neurological: Positive for headaches  All other systems reviewed and are negative         Patient Active Problem List   Diagnosis    Type 2 diabetes mellitus without complication, without long-term current use of insulin ([facility] [address] )    Essential hypertension    Depression    Morbid obesity [facility] [address] )    Screening for colon cancer    Malignant melanoma of face ([facility] )    Screening mammogram, encounter for    Leg swelling    Muscle cramps    COVID-19     Social History     Tobacco Use    Smoking status: Former Smoker     Packs/day: 1 00     Years: 30 00     Pack years: 30 00     Types: Cigarettes    Smokeless tobacco: Never Used   Vaping Use    Vaping Use: Never used   Substance Use Topics    Alcohol use: Not Currently    Drug use: Yes     Types: Marijuana     Comment: Has not used in a long time(edible)      Objective: There were no vitals taken for this visit       Physical Exam  Pulmonary:      Comments: Patient does not seem  To be short of breath, can talks  sentences without getting short of breath        Robby Swartz MD

## 2021-08-19 NOTE — ASSESSMENT & PLAN NOTE
Patient symptoms started on 08/14/2021,  Patient was tested positive on [facility] on 08/17/2021, no  Copy of official results  (+) headache,  Body ache, fog, chills, sweats, shortness of breath with exertion, mild diarrhea, mild abdominal pain     patient concerned of  Patient son care, while getting infusion, used to have visiting nurses and home health however has stop coming in due to COVID positive results, will have difficulty going to the infusion center       plan:  ·  discussed with the patient, that nurse in the office, will try to find solution to help her get caretaker while she is going to the transfusion center, the nurse will call her back if she finds a solution  ·  will place the patient on p r n  albuterol for shortness of breath during exertion, especially when she is 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  ·  patient agreeable to start on vitamin-C, vitamin-D, and zinc for 7 days and can moved to multivitamin, discussed with the patient that currently this is not official  Regarding treatment guidelines right now but was used previously  ·  continue Tylenol p r n   ·  keep hydrated   ·  will follow-up tomorrow due to some shortness of breath on exertion, if no improvement will consider referral the patient to respiratory clinic or Consider starting the patient on p o  steroids   ·  patient is due for antibodies on 08/20/2021  ·  follow CDC guideline regarding COVID-19 precautions  ·  discussed with the patient to use pulse oximetry  As needed with SOB, if oxygen level is less than 90% during rest to call 911 and to let them know that she is COVID positive  ·  patient can also call our office with shortness of breath and we can refer her to respiratory clinic

## 2021-08-20 ENCOUNTER — OFFICE VISIT (OUTPATIENT)
Dept: URGENT CARE | Age: 61
End: 2021-08-20
Payer: COMMERCIAL

## 2021-08-20 ENCOUNTER — TELEMEDICINE (OUTPATIENT)
Dept: INTERNAL MEDICINE CLINIC | Facility: CLINIC | Age: 61
End: 2021-08-20
Payer: COMMERCIAL

## 2021-08-20 ENCOUNTER — TELEPHONE (OUTPATIENT)
Dept: INFUSION CENTER | Facility: CLINIC | Age: 61
End: 2021-08-20

## 2021-08-20 VITALS — TEMPERATURE: 97 F | OXYGEN SATURATION: 94 % | RESPIRATION RATE: 24 BRPM | HEART RATE: 107 BPM

## 2021-08-20 DIAGNOSIS — U07.1 COVID-19: Primary | ICD-10-CM

## 2021-08-20 DIAGNOSIS — R09.02 HYPOXIA: ICD-10-CM

## 2021-08-20 DIAGNOSIS — U07.1 LAB TEST POSITIVE FOR DETECTION OF COVID-19 VIRUS: Primary | ICD-10-CM

## 2021-08-20 PROCEDURE — 99213 OFFICE O/P EST LOW 20 MIN: CPT | Performed by: HOSPITALIST

## 2021-08-20 PROCEDURE — 99213 OFFICE O/P EST LOW 20 MIN: CPT | Performed by: NURSE PRACTITIONER

## 2021-08-20 NOTE — ASSESSMENT & PLAN NOTE
· Tested positive for COVID-19 8/17/2021   [facility]  · Positive exposure from son  · Symptoms are unchanged  She continues to experience headache, chills, sweats, nausea, poor appetite and fatigue  With occasional cough  · However, she reports that her pulse ox today was noted at 80 but improved with deep breaths  She does endorse shortness of breath with minimal exertion  · She is due for a monoclonal antibody treatment today  · Given low oxygen status and shortness of breath, I have referred her to our respiratory clinic for further evaluation

## 2021-08-20 NOTE — PROGRESS NOTES
[address] [facility]        NAME: Raul Dennison is a 62 y o  female  : 1960    MRN: [id number]  DATE: 2021  TIME: 2:15 PM    Assessment and Plan   Lab test positive for detection of COVID-19 virus [U07 1]  1  Lab test positive for detection of COVID-19 virus  Transfer to other facility   2  Hypoxia           Patient Instructions       Sent to the ED via ambulance for further eval    Chief Complaint     Chief Complaint   Patient presents with    Cough     positive covid 8/17 per pt  ([facility] at [facility]), televisit with pcp today, and sent to [facility] -respiratory care visit     Fever    Shortness of Breath    Fatigue         History of Present Illness       HPI   Reports she was diagnosed with covid 19 on 2021  She is covid vaccinated  No Hx of asthma, or COPD  Previous Hx of smoking, stopped several yrs ago  Says at home her oxygen level was dropping to 80% ans she called her PCP  She was advised to come to the urgent care for eval  She was placed on oxygen via nasal cannula, 2L/min and it went up to 93%  Review of Systems   Review of Systems   Constitutional: Positive for fatigue and fever  HENT: Negative for sore throat and trouble swallowing  Respiratory: Positive for cough and shortness of breath  Cardiovascular: Negative for chest pain  Gastrointestinal: Negative for diarrhea and vomiting           Current Medications       Current Outpatient Medications:     albuterol (ProAir HFA) 90 mcg/act inhaler, Inhale 2 puffs every 6 (six) hours as needed for wheezing, Disp: 8 5 g, Rfl: 0    amLODIPine-benazepril (LOTREL) 5-40 MG per capsule, Take 1 capsule by mouth daily, Disp: 30 capsule, Rfl: 3    Ascorbic Acid (vitamin C) 1000 MG tablet, Take 1 tablet (1,000 mg total) by mouth daily for 7 days, Disp: 7 tablet, Rfl: 0    atorvastatin (LIPITOR) 40 mg tablet, Take 1 tablet (40 mg total) by mouth daily after dinner, Disp: 30 tablet, Rfl: 3    cholecalciferol (VITAMIN D3) 1,000 units tablet, Take 1,000 Units by mouth daily, Disp: , Rfl:     cholecalciferol (VITAMIN D3) 1,000 units tablet, Take 2 tablets (2,000 Units total) by mouth daily for 7 days, Disp: 14 tablet, Rfl: 0    furosemide (LASIX) 20 mg tablet, take 1 tablet by mouth once daily if needed for SWELLING, Disp: 30 tablet, Rfl: 0    potassium chloride (K-DUR,KLOR-CON) 20 mEq tablet, Take 1 tablet (20 mEq total) by mouth as needed (Take 1 tablet only when you take lasix), Disp: 30 tablet, Rfl: 5    zinc sulfate (ZINCATE) 220 mg capsule, Take 1 capsule (220 mg total) by mouth daily for 7 days, Disp: 7 capsule, Rfl: 0    metFORMIN (GLUCOPHAGE) 500 mg tablet, Take 2 tablets (1,000 mg total) by mouth 2 (two) times a day with meals, Disp: 120 tablet, Rfl: 3    Current Allergies     Allergies as of 08/20/2021    (No Known Allergies)            The following portions of the patient's history were reviewed and updated as appropriate: allergies, current medications, past family history, past medical history, past social history, past surgical history and problem list      Past Medical History:   Diagnosis Date    Diabetes mellitus ([facility] [address] )     Hypertension        Past Surgical History:   Procedure Laterality Date    BREAST BIOPSY Right 2016    Done at [address] [place] [address] EXTRACTION         Family History   Problem Relation Age of Onset    Heart disease Mother     Diabetes Mother     Liver cancer Mother     Kidney cancer Mother     Heart disease Father     Colon cancer Father     Skin cancer Father     Stroke Brother          Medications have been verified  Objective   Pulse (!) 107   Temp (!) 79 °F (26 1 °C)   Resp (!) 24   SpO2 94% Comment: with 2l oxygen  No LMP recorded  Physical Exam     Physical Exam  Constitutional:       Appearance: She is ill-appearing  She is not diaphoretic  Cardiovascular:      Rate and Rhythm: Regular rhythm  Tachycardia present     Pulmonary:      Effort: Tachypnea present  Breath sounds: Normal breath sounds  No decreased breath sounds or wheezing        Comments: Increased effort which decreased, after starting patient on oxygen

## 2021-08-20 NOTE — PROGRESS NOTES
COVID-19 Outpatient Progress Note    Assessment/Plan:    Problem List Items Addressed This Visit        Other    COVID-19 - Primary     · Tested positive for COVID-19 8/17/2021   [facility]  · Positive exposure from son  · Symptoms are unchanged  She continues to experience headache, chills, sweats, nausea, poor appetite and fatigue  With occasional cough  · However, she reports that her pulse ox today was noted at 80 but improved with deep breaths  She does endorse shortness of breath with minimal exertion  · She is due for a monoclonal antibody treatment today  · Given low oxygen status and shortness of breath, I have referred her to our respiratory clinic for further evaluation  Disposition:     I referred patient to one of our COVID-19 Respiratory Clinics  I recommended continued isolation until at least 24 hours have passed since recovery defined as resolution of fever without the use of fever-reducing medications AND improvement in COVID symptoms AND 10 days have passed since onset of symptoms (or 10 days have passed since date of first positive viral diagnostic test for asymptomatic patients)  Patient is at increased risk of progressing towards severe COVID-19 due to the following high risk criteria:   - Obesity or being overweight  - Diabetes  - Cardiovascular disease    Patient meets criteria for Casirivimab/Imdevimab administration for the treatment of COVID-19  They were counseled in regards to risks, benefits, and side effects of this infusion  Casirivimab and imdevimab are investigational medicines used to treat mild to moderate symptoms of COVID-19 in non-hospitalized adults and adolescents (15years of age and older who weigh at least 80 pounds (40 kg)), and who are at high risk for developing severe COVID-19 symptoms or the need for hospitalization  Casirivimab and imdevimab are investigational because they are still being studied   There is limited information known about the safety and effectiveness of using casirivimab and imdevimab to treat people with COVID-19  The FDA has authorized the emergency use of casirivimab and imdevimab for the treatment of COVID-19 under an Emergency Use Authorization (EUA)  Possible side effects of casirivimab and imdevimab: Allergic reactions can happen during and after infusion with casirivimab and imdevimab  Possible reactions include: fever, chills, nausea, headache, shortness of breath, low blood pressure, wheezing, swelling of your lips, face, or throat, rash including hives, itching, muscle aches, and dizziness  The side effects of getting any medicine by vein may include brief pain, bleeding, bruising of the skin, soreness, swelling, and possible infection at the infusion site  These are not all the possible side effects of casirivimab and imdevimab  Not a lot of people have been given casirivimab and imdevimab  Serious and unexpected side effects may happen  Casirivimab and imdevimab are still being studied so it is possible that all of the risks are not known at this time  It is possible that casirivimab and imdevimab could interfere with your body's own ability to fight off a future infection of SARS-CoV-2  Similarly, casirivimab and imdevimab may reduce your body's immune response to a vaccine for SARS-CoV-2  Specific studies have not been conducted to address these possible risks  Emergency Use Authorization:    The [facility] FDA has made casirivimab and imdevimab available under an emergency access mechanism called an EUA  The EUA is supported by a  of Health and Human Service (HHS) declaration that circumstances exist to justify the emergency use of drugs and biological products during the COVID-19 pandemic  Casirivimab and imdevimab have not undergone the same type of review as an FDA-approved or cleared product       The FDA may issue an EUA when certain criteria are met, which includes that there are no adequate, approved, available alternatives  In addition, the FDA decision is based on the totality of scientific evidence available showing that it is reasonable to believe that the product meets certain criteria for safety, performance, and labeling and may be effective in treatment of patients during the COVID-19 pandemic  All of these criteria must be met to allow for the product to be used in the treatment of patients during the COVID-19 pandemic  The EUA for casirivimab and imdevimab is in effect for the duration of the COVID-19 declaration justifying emergency use of these products, unless terminated or revoked (after which the products may no longer be used)  Regarding COVID-19 Vaccination:    Currently there is no data or safety or efficacy of COVID-19 vaccination in persons who received monoclonal antibodies as part of COVID-19 treatment  Treatment should be deferred for at least 90 days to avoid interference of the treatment with vaccine-induced immune responses (this is based on estimated half-life of therapies and evidence suggesting reinfection is uncommon within 90 days of initial infection)  The patient consents to proceed with casirivimab and imdevimab administration  I have spent 10 minutes directly with the patient  Greater than 50% of this time was spent in counseling/coordination of care regarding: instructions for management and patient and family education          Verification of patient location:    Patient is located in the following state in which I hold an active license [place]    Encounter provider Reina Moore MD    Provider located at [address] [place] [id number] [phone]    Recent Visits  Date Type Provider Dept   08/18/21 Telemedicine Reza Veras MD [facility]   08/17/21 Telemedicine Reza Veras MD  [facility]   Showing recent visits within past 7 days and meeting all other requirements  Today's Visits  Date Type Provider Dept   08/20/21 Telemedicine Taurus Bazzi MD [facility]   Showing today's visits and meeting all other requirements  Future Appointments  No visits were found meeting these conditions  Showing future appointments within next 150 days and meeting all other requirements     This virtual check-in was done via [employer] and patient was informed that this is a secure, HIPAA-compliant platform  She agrees to proceed  Patient agrees to participate in a virtual check in via telephone or video visit instead of presenting to the office to address urgent/immediate medical needs  Patient is aware this is a billable service  After connecting through [place], the patient was identified by name and date of birth  Tamela Parmar was informed that this was a telemedicine visit and that the exam was being conducted confidentially over secure lines  My office door was closed  Tamela Parmar acknowledged consent and understanding of privacy and security of the telemedicine visit  I informed the patient that I have reviewed her record in Epic and presented the opportunity for her to ask any questions regarding the visit today  The patient agreed to participate  Subjective:   Tamela Parmar is a 62 y o  female who has been screened for COVID-19  Symptom change since last report: unchanged  Patient's symptoms include chills, fatigue, malaise, cough, shortness of breath, nausea, diarrhea and headache  Patient denies fever, abdominal pain and vomiting  Date of symptom onset: 8/14/2021  Date of positive COVID-19 PCR: 8/17/2021  COVID-19 vaccination status: Fully vaccinated    Miriam Dowd has been staying home and has isolated themselves in her home  She is taking care to not share personal items and is cleaning all surfaces that are touched often, like counters, tabletops, and doorknobs using household cleaning sprays or wipes   She is wearing a mask when she leaves her room  No results found for: David Terrell, [address] [facility], [address] [facility] [address] [facility], [place] [address]  Past Medical History:   Diagnosis Date    Diabetes mellitus ([address] )     Hypertension      Past Surgical History:   Procedure Laterality Date    BREAST BIOPSY Right 2016    Done at [address] [place] [address]       Current Outpatient Medications   Medication Sig Dispense Refill    albuterol (ProAir HFA) 90 mcg/act inhaler Inhale 2 puffs every 6 (six) hours as needed for wheezing 8 5 g 0    amLODIPine-benazepril (LOTREL) 5-40 MG per capsule Take 1 capsule by mouth daily 30 capsule 3    Ascorbic Acid (vitamin C) 1000 MG tablet Take 1 tablet (1,000 mg total) by mouth daily for 7 days 7 tablet 0    atorvastatin (LIPITOR) 40 mg tablet Take 1 tablet (40 mg total) by mouth daily after dinner 30 tablet 3    cholecalciferol (VITAMIN D3) 1,000 units tablet Take 1,000 Units by mouth daily      cholecalciferol (VITAMIN D3) 1,000 units tablet Take 2 tablets (2,000 Units total) by mouth daily for 7 days 14 tablet 0    furosemide (LASIX) 20 mg tablet take 1 tablet by mouth once daily if needed for SWELLING 30 tablet 0    metFORMIN (GLUCOPHAGE) 500 mg tablet Take 2 tablets (1,000 mg total) by mouth 2 (two) times a day with meals 120 tablet 3    potassium chloride (K-DUR,KLOR-CON) 20 mEq tablet Take 1 tablet (20 mEq total) by mouth as needed (Take 1 tablet only when you take lasix) 30 tablet 5    zinc sulfate (ZINCATE) 220 mg capsule Take 1 capsule (220 mg total) by mouth daily for 7 days 7 capsule 0     No current facility-administered medications for this visit  No Known Allergies    Review of Systems   Constitutional: Positive for chills and fatigue  Negative for fever  Respiratory: Positive for cough and shortness of breath  Cardiovascular: Negative for chest pain, palpitations and leg swelling  Gastrointestinal: Positive for diarrhea and nausea   Negative for abdominal pain, constipation and vomiting  Neurological: Positive for headaches  Objective: There were no vitals filed for this visit  Physical Exam  Constitutional:       General: She is not in acute distress  Appearance: She is ill-appearing  She is not diaphoretic  Eyes:      Conjunctiva/sclera: Conjunctivae normal    Pulmonary:      Effort: Pulmonary effort is normal    Psychiatric:         Mood and Affect: Mood normal          Behavior: Behavior normal          VIRTUAL VISIT DISCLAIMER    Volodymyr Reilly verbally agrees to participate in [facility]  Pt is aware that [facility] could be limited without vital signs or the ability to perform a full hands-on physical Sarah Cosier understands she or the provider may request at any time to terminate the video visit and request the patient to seek care or treatment in person

## 2021-08-20 NOTE — PATIENT INSTRUCTIONS
Hypoxia   WHAT YOU NEED TO KNOW:   Hypoxia is a decreased level of oxygen in all or part of your body, such as your brain  Some conditions can cause hypoxia to occur suddenly  Other conditions may cause hypoxia to occur over time  DISCHARGE INSTRUCTIONS:   Follow up with your healthcare provider as directed: You may need to return for more tests to find the cause of your hypoxia  Write down your questions so you remember to ask them during your visits  Contact your healthcare provider if:   · Your muscles jerk  · You have questions or concerns about your condition or care  Return to the emergency department if:   · You have a seizure  · You faint  · You are irritable, confused, or unusually drowsy  © Copyright [employer] 2021 Information is for End User's use only and may not be sold, redistributed or otherwise used for commercial purposes  All illustrations and images included in CareNotes® are the copyrighted property of A D A M , Inc  or Linda Lopez   The above information is an  only  It is not intended as medical advice for individual conditions or treatments  Talk to your doctor, nurse or pharmacist before following any medical regimen to see if it is safe and effective for you

## 2021-08-20 NOTE — TELEPHONE ENCOUNTER
Pt aware that she must bring a copy of her positive test results with her to infusion in order to be treated  Pt verbalizes understanding

## 2021-08-24 ENCOUNTER — TELEPHONE (OUTPATIENT)
Dept: INTERNAL MEDICINE CLINIC | Facility: CLINIC | Age: 61
End: 2021-08-24

## 2021-08-24 NOTE — TELEPHONE ENCOUNTER
Previous messages in patient husbands chart  I did follow up with Ana trying to guide her on how to get in on Friday for her monoclonal antibody infusion  She did not have a caregiver for her son and she could not leave him alone  I did tell her the importance of getting herself some help  She was going to contact the  and try to get respite care for her son

## 2021-09-01 ENCOUNTER — TELEPHONE (OUTPATIENT)
Dept: INTERNAL MEDICINE CLINIC | Facility: CLINIC | Age: 61
End: 2021-09-01

## 2021-09-01 ENCOUNTER — TRANSITIONAL CARE MANAGEMENT (OUTPATIENT)
Dept: INTERNAL MEDICINE CLINIC | Facility: CLINIC | Age: 61
End: 2021-09-01

## 2021-09-01 NOTE — TELEPHONE ENCOUNTER
Tcm, discharged from [facility]-, we can have her come in for visit   She has appt 9-2-2021

## 2021-09-01 NOTE — TELEPHONE ENCOUNTER
We can do a TCM for her virtual must be a visual to do the TCM virutally  This patient is having some mid back pain using o2  She is proning at when in bed I did tell if back pain worsens or is associated with shortness of breath to return to the ER  I also tld her about our after hours options if she is having any question or concern

## 2021-09-02 ENCOUNTER — TELEMEDICINE (OUTPATIENT)
Dept: INTERNAL MEDICINE CLINIC | Facility: CLINIC | Age: 61
End: 2021-09-02
Payer: COMMERCIAL

## 2021-09-02 DIAGNOSIS — U09.9 POST-COVID-19 CONDITION: ICD-10-CM

## 2021-09-02 DIAGNOSIS — Z99.81 CHRONIC RESPIRATORY FAILURE WITH HYPOXIA, ON HOME O2 THERAPY (HCC): Primary | ICD-10-CM

## 2021-09-02 DIAGNOSIS — I10 ESSENTIAL HYPERTENSION: ICD-10-CM

## 2021-09-02 DIAGNOSIS — J96.11 CHRONIC RESPIRATORY FAILURE WITH HYPOXIA, ON HOME O2 THERAPY (HCC): Primary | ICD-10-CM

## 2021-09-02 DIAGNOSIS — E11.9 TYPE 2 DIABETES MELLITUS WITHOUT COMPLICATION, WITHOUT LONG-TERM CURRENT USE OF INSULIN (HCC): ICD-10-CM

## 2021-09-02 PROCEDURE — 1036F TOBACCO NON-USER: CPT | Performed by: INTERNAL MEDICINE

## 2021-09-02 PROCEDURE — 99213 OFFICE O/P EST LOW 20 MIN: CPT | Performed by: INTERNAL MEDICINE

## 2021-09-02 RX ORDER — AMLODIPINE BESYLATE AND BENAZEPRIL HYDROCHLORIDE 5; 40 MG/1; MG/1
1 CAPSULE ORAL DAILY
Qty: 30 CAPSULE | Refills: 3 | Status: SHIPPED | OUTPATIENT
Start: 2021-09-02 | End: 2022-01-18

## 2021-09-02 NOTE — ASSESSMENT & PLAN NOTE
·  Symptom onset 8/16/2021  ·  hospital admission from 08/20 to 8/31 requiring ICU care  · Completed 5 days of remdesivir, 10 days of Decadron and Baricitinib  ·  was discharged with supplementary oxygen 2 L  · Currently has ongoing hypoxia with minimal activity (see above )   · Denies fever, chills  Does have ongoing sweating at times, likely related to post COVID syndrome   · Currently has ongoing fatigue  ·  good p o  intake of food and liquid, GI symptoms have now resolved     plan  1  currently taking vitamin-D and vitamin-C, informed patient to discontinue vitamin-C after total of 7 days   2   Continue to monitor SpO2 (see above )  3   continue self isolation until 09/06/2021  4   pulmonology  referral

## 2021-09-02 NOTE — ASSESSMENT & PLAN NOTE
·   Recent hospital admission secondary to COVID pneumonia, requiring ICU care  ·   Initially on OptiFlow plus non-rebreather, was successfully weaned down to 2 L nasal cannula on discharge after treatment  ·  currently on 2 L nasal cannula at home, states that she maintains saturations above 92% at rest, however with minimal activities, the oxygen level drops to 86- 87% while on 2 L  ·   No worsening respiratory symptoms, no evidence of pulmonary edema  (denies orthopnea, PND, weight gain, lower extremity edema ), no fevers or chills, has ongoing cough which is improving, no pleuritic chest pain     plan  1  informed patient to increase oxygen to 4 L with minimal activities and titrate further up to maintain SpO2  Above 92%  2  Has been using incentive spirometry at home regularly, advised to continue using this   3   ambulatory referral to pulmonology  4  Patient has furosemide 20 mg p r n , has not required recently    5   advised to monitor  SpO2 regularly, and if she develops worsening symptoms or  Has increased oxygen requirements, to  Call  [facility]  6   will follow-up on 09/06/2021

## 2021-09-02 NOTE — PROGRESS NOTES
COVID-19 Outpatient Progress Note    Assessment/Plan:    Problem List Items Addressed This Visit        Respiratory    Chronic respiratory failure with hypoxia, on home O2 therapy ([facility] [address] ) - Primary     ·   Recent hospital admission secondary to COVID pneumonia, requiring ICU care  ·   Initially on OptiFlow plus non-rebreather, was successfully weaned down to 2 L nasal cannula on discharge after treatment  ·  currently on 2 L nasal cannula at home, states that she maintains saturations above 92% at rest, however with minimal activities, the oxygen level drops to 86- 87% while on 2 L  ·   No worsening respiratory symptoms, no evidence of pulmonary edema  (denies orthopnea, PND, weight gain, lower extremity edema ), no fevers or chills, has ongoing cough which is improving, no pleuritic chest pain     plan  1  informed patient to increase oxygen to 4 L with minimal activities and titrate further up to maintain SpO2  Above 92%  2  Has been using incentive spirometry at home regularly, advised to continue using this   3   ambulatory referral to pulmonology  4  Patient has furosemide 20 mg p r n , has not required recently  5   advised to monitor  SpO2 regularly, and if she develops worsening symptoms or  Has increased oxygen requirements, to  Call  [facility]  6   will follow-up on 09/06/2021         Relevant Orders    Ambulatory referral to Pulmonology       Other    Post-COVID-19 condition     ·  Symptom onset 8/16/2021  ·  hospital admission from 08/20 to 8/31 requiring ICU care  · Completed 5 days of remdesivir, 10 days of Decadron and Baricitinib  ·  was discharged with supplementary oxygen 2 L  · Currently has ongoing hypoxia with minimal activity (see above )   · Denies fever, chills    Does have ongoing sweating at times, likely related to post COVID syndrome   · Currently has ongoing fatigue  ·  good p o  intake of food and liquid, GI symptoms have now resolved     plan  7  currently taking vitamin-D and vitamin-C, informed patient to discontinue vitamin-C after total of 7 days   8  Continue to monitor SpO2 (see above )  9   continue self isolation until 09/06/2021  10   pulmonology  referral         Relevant Orders    Ambulatory referral to Pulmonology         Disposition:     I have spent 20 minutes directly with the patient  Greater than 50% of this time was spent in counseling/coordination of care regarding: diagnostic results, prognosis, instructions for management, patient and family education, risk factor reductions and impressions  Verification of patient location:    Patient is located in the following state in which I hold an active license [place]    Encounter provider Alice Granger MD    Provider located at [facility] [address] [place] [address]  [phone]    Recent Visits  Date Type Provider Dept   09/01/21 Telephone Kadi Richards MD [facility]ena Menezes recent visits within past 7 days and meeting all other requirements  Today's Visits  Date Type Provider Dept   09/02/21 Telemedicine Alice Granger MD [facility]   Showing today's visits and meeting all other requirements  Future Appointments  No visits were found meeting these conditions  Showing future appointments within next 150 days and meeting all other requirements     This virtual check-in was done via [employer] and patient was informed that this is a secure, HIPAA-compliant platform  She agrees to proceed  Patient agrees to participate in a virtual check in via telephone or video visit instead of presenting to the office to address urgent/immediate medical needs  Patient is aware this is a billable service  After connecting through [facility], the patient was identified by name and date of birth  Volodymyr Tommie was informed that this was a telemedicine visit and that the exam was being conducted confidentially over secure lines   My office door was closed  No one else was in the room  Volodymyr Reilly acknowledged consent and understanding of privacy and security of the telemedicine visit  I informed the patient that I have reviewed her record in Epic and presented the opportunity for her to ask any questions regarding the visit today  The patient agreed to participate  Subjective:   Volodymyr Reilly is a 62 y o  female who has been screened for COVID-19  Symptom change since last report: improving  Patient's symptoms include fatigue, malaise, cough and shortness of breath  Patient denies fever, congestion, anosmia, loss of taste, chest tightness, abdominal pain, nausea, vomiting, diarrhea and myalgias  Date of symptom onset: 8/16/2021  Date of positive COVID-19 PCR: 8/25/2021  COVID-19 vaccination status: Fully vaccinated    Nicole Regan has been staying home and has isolated themselves in her home  She is taking care to not share personal items and is cleaning all surfaces that are touched often, like counters, tabletops, and doorknobs using household cleaning sprays or wipes  She is wearing a mask when she leaves her room       Lab Results   Component Value Date    SARSCORONAVI Detected (A) 08/25/2021     Past Medical History:   Diagnosis Date    Diabetes mellitus ([facility] [address] )     Hypertension      Past Surgical History:   Procedure Laterality Date    BREAST BIOPSY Right 2016    Done at [address] [place] [address]       Current Outpatient Medications   Medication Sig Dispense Refill    albuterol (ProAir HFA) 90 mcg/act inhaler Inhale 2 puffs every 6 (six) hours as needed for wheezing 8 5 g 0    amLODIPine-benazepril (LOTREL) 5-40 MG per capsule Take 1 capsule by mouth daily 30 capsule 3    Ascorbic Acid (vitamin C) 1000 MG tablet Take 1 tablet (1,000 mg total) by mouth daily for 7 days 7 tablet 0    atorvastatin (LIPITOR) 40 mg tablet Take 1 tablet (40 mg total) by mouth daily after dinner 30 tablet 3    cholecalciferol (VITAMIN D3) 1,000 units tablet Take 1,000 Units by mouth daily      cholecalciferol (VITAMIN D3) 1,000 units tablet Take 2 tablets (2,000 Units total) by mouth daily for 7 days 14 tablet 0    furosemide (LASIX) 20 mg tablet take 1 tablet by mouth once daily if needed for SWELLING 30 tablet 0    metFORMIN (GLUCOPHAGE) 500 mg tablet Take 2 tablets (1,000 mg total) by mouth 2 (two) times a day with meals 120 tablet 3    potassium chloride (K-DUR,KLOR-CON) 20 mEq tablet Take 1 tablet (20 mEq total) by mouth as needed (Take 1 tablet only when you take lasix) 30 tablet 5    zinc sulfate (ZINCATE) 220 mg capsule Take 1 capsule (220 mg total) by mouth daily for 7 days 7 capsule 0     No current facility-administered medications for this visit  No Known Allergies    Review of Systems   Constitutional: Positive for fatigue  Negative for fever  HENT: Negative for congestion and sinus pain  Eyes: Negative  Respiratory: Positive for cough and shortness of breath  Negative for chest tightness and wheezing  Cardiovascular: Negative for chest pain, palpitations and leg swelling  Gastrointestinal: Negative for abdominal pain, constipation, diarrhea, nausea and vomiting  Genitourinary: Positive for frequency  Negative for dysuria and urgency  Musculoskeletal: Positive for back pain  Negative for arthralgias, joint swelling and myalgias  Skin: Negative  Allergic/Immunologic: Negative  Neurological: Negative  Negative for dizziness, syncope, weakness and light-headedness  Hematological: Negative  Psychiatric/Behavioral: Negative  Objective: There were no vitals filed for this visit  VIRTUAL VISIT DISCLAIMER    Trina Mccoycarl verbally agrees to participate in [facility]   Pt is aware that [facility] could be limited without vital signs or the ability to perform a full hands-on physical Alicia Corner understands she or the provider may request at any time to terminate the video visit and request the patient to seek care or treatment in person

## 2021-09-02 NOTE — TELEPHONE ENCOUNTER
Skylar Burgess has called the IM office in regards to Lalito Edwards stated Jess Monson had forgotten to ask for a refill of her medications  Amando Parks requested a refill of amLoDIPine-benzapril (LOTREL)5 -40 mg per capsule and metFORMIN (GLUCOPHAGE) 500 mg tablet to be sent to the [facility] on [address]  They had recently seen Dr Slim Lynch, who was informed of the med refill request, and agreed to refill the medications

## 2021-09-07 ENCOUNTER — TELEMEDICINE (OUTPATIENT)
Dept: INTERNAL MEDICINE CLINIC | Facility: CLINIC | Age: 61
End: 2021-09-07
Payer: COMMERCIAL

## 2021-09-07 DIAGNOSIS — U07.1 COVID-19: Primary | ICD-10-CM

## 2021-09-07 PROCEDURE — 99213 OFFICE O/P EST LOW 20 MIN: CPT | Performed by: INTERNAL MEDICINE

## 2021-09-07 NOTE — PROGRESS NOTES
Virtual Regular Visit    Verification of patient location:    Patient is located in the following state in which I hold an active license [place]      Assessment/Plan:    Problem List Items Addressed This Visit        Other    COVID-19 - Primary     · Onset of symptoms on 08/16/2021 with COVID positive test on 08/25/2021  ·   Was in the hospital  recently requiring ICU care and discharged on O2 2L  ·  on virtual visit done last week, patient was requiring around 2 L of oxygen at rest with up to 4 L with exertion  ·  on this visit, states improvement as she now requires just both 2 L with rest and with exertion ( SpO2 consistently greater than 92%)  ·  may discontinue self isolation  ·  will follow-up in 1 week  ·  instructed patient to call our office if with any increased shortness of breath, chest pain, fever  ·  has appointment with Pulm on 09/21  · Continue incentive spirometry                    Reason for visit is   Chief Complaint   Patient presents with    Virtual Regular Visit        Encounter provider Serenity Mccabe MD    Provider located at [address] [place] [id number] [phone]      Recent Visits  Date Type Provider Dept   09/02/21 Telemedicine Daren Bradley MD [facility]   09/01/21 Telephone Bryan Sanchez MD [facility]   Showing recent visits within past 7 days and meeting all other requirements  Today's Visits  Date Type Provider Dept   09/07/21 Telemedicine Serenity Mccabe MD [facility]   Showing today's visits and meeting all other requirements  Future Appointments  No visits were found meeting these conditions  Showing future appointments within next 150 days and meeting all other requirements       The patient was identified by name and date of birth   Alyx Bernard was informed that this is a telemedicine visit and that the visit is being conducted through [facility] and patient was informed that this is a secure, HIPAA-compliant platform  She agrees to proceed     My office door was closed  No one else was in the room  She acknowledged consent and understanding of privacy and security of the video platform  The patient has agreed to participate and understands they can discontinue the visit at any time  Patient is aware this is a billable service  Kenia Beck is a 62 y o  female   HPI     Since her virtual visit with us last week, patient states that she has experienced continued improvement in her symptoms overall  Her O2 requirements have now been 2 L both with rest and exertion, this is an improvement from last week where she would require up to 4 L with exertion  She also states that her appetite has improved  She denies any increased shortness of breath, chest pain, fever or chills, abdominal pain, diarrhea, nausea or vomiting  Her only other concern was difficulty sleeping over the last 2 days but that this has improved with taking melatonin       Past Medical History:   Diagnosis Date    Diabetes mellitus ([facility] [address] )     Hypertension        Past Surgical History:   Procedure Laterality Date    BREAST BIOPSY Right 2016    Done at [address] [place] [address] EXTRACTION         Current Outpatient Medications   Medication Sig Dispense Refill    albuterol (ProAir HFA) 90 mcg/act inhaler Inhale 2 puffs every 6 (six) hours as needed for wheezing 8 5 g 0    amLODIPine-benazepril (LOTREL) 5-40 MG per capsule Take 1 capsule by mouth daily 30 capsule 3    Ascorbic Acid (vitamin C) 1000 MG tablet Take 1 tablet (1,000 mg total) by mouth daily for 7 days 7 tablet 0    atorvastatin (LIPITOR) 40 mg tablet Take 1 tablet (40 mg total) by mouth daily after dinner 30 tablet 3    cholecalciferol (VITAMIN D3) 1,000 units tablet Take 1,000 Units by mouth daily      cholecalciferol (VITAMIN D3) 1,000 units tablet Take 2 tablets (2,000 Units total) by mouth daily for 7 days 14 tablet 0    furosemide (LASIX) 20 mg tablet take 1 tablet by mouth once daily if needed for SWELLING 30 tablet 0    metFORMIN (GLUCOPHAGE) 500 mg tablet Take 2 tablets (1,000 mg total) by mouth 2 (two) times a day with meals 120 tablet 3    potassium chloride (K-DUR,KLOR-CON) 20 mEq tablet Take 1 tablet (20 mEq total) by mouth as needed (Take 1 tablet only when you take lasix) 30 tablet 5    zinc sulfate (ZINCATE) 220 mg capsule Take 1 capsule (220 mg total) by mouth daily for 7 days 7 capsule 0     No current facility-administered medications for this visit  No Known Allergies    Review of Systems   Constitutional: Negative for chills and fever  HENT: Negative for trouble swallowing  Respiratory: Negative for cough  Cardiovascular: Negative for chest pain  Gastrointestinal: Negative for constipation, diarrhea, nausea and vomiting  Neurological: Negative for dizziness and light-headedness  Psychiatric/Behavioral: Negative for agitation and confusion  All other systems reviewed and are negative  Video Exam    There were no vitals filed for this visit  Physical Exam  Constitutional:       General: She is not in acute distress  HENT:      Nose:      Comments: Nasal cannula in place  Neurological:      Mental Status: She is alert  Encounter done virtually via video visit      [address] verbally agrees to participate in [employer]  Pt is aware that [employer] could be limited without vital signs or the ability to perform a full hands-on physical Vangie Michel understands she or the provider may request at any time to terminate the video visit and request the patient to seek care or treatment in person

## 2021-09-07 NOTE — ASSESSMENT & PLAN NOTE
· Onset of symptoms on 08/16/2021 with COVID positive test on 08/25/2021    ·   Was in the hospital  recently requiring ICU care and discharged on O2 2L  ·  on virtual visit done last week, patient was requiring around 2 L of oxygen at rest with up to 4 L with exertion  ·  on this visit, states improvement as she now requires just both 2 L with rest and with exertion ( SpO2 consistently greater than 92%)  ·  may discontinue self isolation  ·  will follow-up in 1 week  ·  instructed patient to call our office if with any increased shortness of breath, chest pain, fever  ·  has appointment with Pulm on 09/21  · Continue incentive spirometry

## 2021-09-08 ENCOUNTER — TELEPHONE (OUTPATIENT)
Dept: INTERNAL MEDICINE CLINIC | Facility: CLINIC | Age: 61
End: 2021-09-08

## 2021-09-08 NOTE — TELEPHONE ENCOUNTER
Go Peña has called the IM office in regards to [place] virtual appointment on 08/17/21 with Dr Diana Carr stated he had received a bill regarding the appointment and wished to know the reason for the bill  Informed Jerry,since the appointment was virtual visit her co pay could not be collected at the moment  Lynn Carr had then asked if the bill could be paid over the phone  Informed Jerry on the bill there are different ways to pay off the bill, including through telephone or computer  Lynn Carr was thankful for the information and ended the call

## 2021-09-14 ENCOUNTER — OFFICE VISIT (OUTPATIENT)
Dept: INTERNAL MEDICINE CLINIC | Facility: CLINIC | Age: 61
End: 2021-09-14
Payer: COMMERCIAL

## 2021-09-14 VITALS
OXYGEN SATURATION: 97 % | TEMPERATURE: 98.7 F | BODY MASS INDEX: 50.02 KG/M2 | WEIGHT: 293 LBS | HEART RATE: 85 BPM | DIASTOLIC BLOOD PRESSURE: 86 MMHG | SYSTOLIC BLOOD PRESSURE: 124 MMHG | HEIGHT: 64 IN

## 2021-09-14 DIAGNOSIS — U07.1 COVID-19: ICD-10-CM

## 2021-09-14 DIAGNOSIS — Z99.81 CHRONIC RESPIRATORY FAILURE WITH HYPOXIA, ON HOME O2 THERAPY (HCC): ICD-10-CM

## 2021-09-14 DIAGNOSIS — Z23 NEED FOR TDAP VACCINATION: ICD-10-CM

## 2021-09-14 DIAGNOSIS — Z00.00 HEALTHCARE MAINTENANCE: ICD-10-CM

## 2021-09-14 DIAGNOSIS — U09.9 POST-COVID-19 CONDITION: ICD-10-CM

## 2021-09-14 DIAGNOSIS — Z00.00 ANNUAL PHYSICAL EXAM: Primary | ICD-10-CM

## 2021-09-14 DIAGNOSIS — J96.11 CHRONIC RESPIRATORY FAILURE WITH HYPOXIA, ON HOME O2 THERAPY (HCC): ICD-10-CM

## 2021-09-14 PROCEDURE — 3074F SYST BP LT 130 MM HG: CPT | Performed by: INTERNAL MEDICINE

## 2021-09-14 PROCEDURE — 3079F DIAST BP 80-89 MM HG: CPT | Performed by: INTERNAL MEDICINE

## 2021-09-14 PROCEDURE — 99214 OFFICE O/P EST MOD 30 MIN: CPT | Performed by: INTERNAL MEDICINE

## 2021-09-14 PROCEDURE — 90471 IMMUNIZATION ADMIN: CPT

## 2021-09-14 PROCEDURE — 90715 TDAP VACCINE 7 YRS/> IM: CPT

## 2021-09-14 RX ORDER — FLUOCINOLONE ACETONIDE 0.25 MG/G
CREAM TOPICAL
COMMUNITY
Start: 2021-08-10

## 2021-09-14 NOTE — ASSESSMENT & PLAN NOTE
· States that her last colonoscopy was done about 5 or more years ago at [facility] and reports that she was recommended by her doctor then to have another colonoscopy done in 5 years   ·  I talked to her regarding FIT testing (if  Positive would need colonoscopy, if negative would need another FIT in 1 year) however she states she would prefer colonoscopy   ·   Mammogram ordered during a prior visit  · Patient wants GYN referral -- cannot recall when last pap smear was  · One time HIV screen  · Tdap given this visit

## 2021-09-14 NOTE — PROGRESS NOTES
Assessment/Plan:    Annual physical exam  · States that her last colonoscopy was done about 5 or more years ago at [facility] and reports that she was recommended by her doctor then to have another colonoscopy done in 5 years   ·  I talked to her regarding FIT testing (if  Positive would need colonoscopy, if negative would need another FIT in 1 year) however she states she would prefer colonoscopy   ·   Mammogram ordered during a prior visit  · Patient wants GYN referral -- cannot recall when last pap smear was  · One time HIV screen  · Tdap given this visit    Post-COVID-19 condition  · still currently on 2 L oxygen supplementation  · Still has occasional shortness of breath with exertion  · Wean O2 as tolerated with target SpO2 greater than 90%  · Has appointment with Pulm on 09/21  · Patient was admitted out [facility] last month and was not placed on anticoagulation on discharge  · Educated patient regarding increased risk of blood clots among COVID-19 patients and advised her regarding symptoms of pulmonary embolism and DVT  · Will review our current guidelines regarding post discharge VTE prophylaxis and once confirmed, will call patient to advise    Chronic respiratory failure with hypoxia, on home O2 therapy ([address] )  · Plan as outlined above       Diagnoses and all orders for this visit:    Annual physical exam    Healthcare maintenance  -     HIV 1/2 Antigen/Antibody (4th Generation) w Reflex [facility]; Future  -     Ambulatory referral for colonoscopy; Future  -     Ambulatory referral to Gynecology; Future    Post COVID syndrome    Need for Tdap vaccination  -     TDAP VACCINE GREATER THAN OR EQUAL TO 8YO IM    Post-COVID-19 condition    Chronic respiratory failure with hypoxia, on home O2 therapy ([address] )    Other orders  -     fluocinolone (SYNALAR) 0 025 % cream; apply to affected area twice a day to THE FACE          Subjective:      Patient ID: Lia Mares is a 62 y o  female      HPI    I had the pleasure of seeing Narcisa in the office today came in for follow-up  She was last seen by our office during a virtual visit last week as a follow-up post COVID infection  Onset of symptoms was on 08/16/2021 with COVID positive test on 08/25/2021  She was admitted in the hospital last month requiring ICU care and discharge on O2 supplementation at 2 L  During her virtual visit with us last week she remained on 2 L of oxygen both with rest and with exertion, this was an improvement when compared virtual visit before where she required around 2 L of oxygen at rest and up to 4 L with exertion  She states that overall her condition has improved, her appetite has returned  She denies any chest pain, abdominal pain, nausea or vomiting  No urinary issues or bowel movement issues  Her only complaint is persistence of shortness of Breath and fatigue  She is unable to wean her oxygen at this time without desaturating or getting short of breath  She is scheduled to follow-up with Pulm on 09/21  The following portions of the patient's history were reviewed and updated as appropriate: allergies, current medications, past family history, past medical history, past social history, past surgical history and problem list     Review of Systems   Constitutional: Negative for appetite change, chills and fever  HENT: Negative for congestion and trouble swallowing  Respiratory: Positive for shortness of breath  Cardiovascular: Negative for chest pain  Gastrointestinal: Negative for abdominal pain, constipation, diarrhea and vomiting  Genitourinary: Negative for dysuria and hematuria  Musculoskeletal: Negative for back pain, gait problem and joint swelling  Neurological: Negative for dizziness, light-headedness and headaches  Psychiatric/Behavioral: Negative for agitation and confusion               Objective:      /86 (BP Location: Left arm, Patient Position: Sitting, Cuff Size: Standard)   Pulse 85   Temp 98 7 °F (37 1 °C) (Tympanic)   Ht 5' 4" (1 626 m)   Wt 134 kg (295 lb 9 6 oz)   SpO2 97%   BMI 50 74 kg/m²          Physical Exam  Vitals reviewed  Constitutional:       General: She is not in acute distress  Appearance: She is not ill-appearing  Comments: 2L O2 supplementation via nasal cannula   HENT:      Nose: No congestion  Eyes:      General: No scleral icterus  Cardiovascular:      Rate and Rhythm: Normal rate and regular rhythm  Pulses: Normal pulses  Heart sounds: No murmur heard  Pulmonary:      Effort: Pulmonary effort is normal  No respiratory distress  Breath sounds: No wheezing or rales  Abdominal:      General: Bowel sounds are normal  There is no distension  Tenderness: There is no abdominal tenderness  Musculoskeletal:      Right lower leg: Edema present  Left lower leg: Edema present  Skin:     General: Skin is warm  Capillary Refill: Capillary refill takes less than 2 seconds  Neurological:      General: No focal deficit present  Mental Status: She is alert and oriented to person, place, and time

## 2021-09-14 NOTE — ASSESSMENT & PLAN NOTE
· still currently on 2 L oxygen supplementation  · Still has occasional shortness of breath with exertion  · Wean O2 as tolerated with target SpO2 greater than 90%  · Has appointment with Pulm on 09/21  · Patient was admitted out [facility] last month and was not placed on anticoagulation on discharge  · Educated patient regarding increased risk of blood clots among COVID-19 patients and advised her regarding symptoms of pulmonary embolism and DVT  · Will review our current guidelines regarding post discharge VTE prophylaxis and once confirmed, will call patient to advise

## 2021-09-14 NOTE — LETTER
September 14, 2021     Patient: Kiran Romo   YOB: 1960   Date of Visit: 9/14/2021       To Whom it May Concern:    Kiran Romo is under my professional care  She was seen in my office on 9/14/2021  I am planning to see her back on the first week of October and will make determination then whether she will be safe to go back to work without restrictions 10/11/2021  If you have any questions or concerns, please don't hesitate to call           Sincerely,          Angie Bull MD        CC: No Recipients

## 2021-09-14 NOTE — PROGRESS NOTES
ADULT ANNUAL [address],  [address] [facility]ICINE [place]    NAME: Delores Vazquez  AGE: 62 y o  SEX: female  : 1960     DATE: 2021     Assessment and Plan:     Problem List Items Addressed This Visit        Other    Annual physical exam - Primary     · States that her last colonoscopy was done about 5 or more years ago at [facility] and reports that she was recommended by her doctor then to have another colonoscopy done in 5 years   ·  I talked to her regarding FIT testing (if  Positive would need colonoscopy, if negative would need another FIT in 1 year) however she states she would prefer colonoscopy   ·   Mammogram ordered during a prior visit  · Patient wants GYN referral -- cannot recall when last pap smear was  · One time HIV screen  · Tdap given this visit         COVID-19      Other Visit Diagnoses     Need for Tdap vaccination        Relevant Orders    TDAP VACCINE GREATER THAN OR EQUAL TO 8YO IM (Completed)            Patient presents for follow-up as well as annual physical exam   Please refer to my separate note regarding COVID follow up plan  Immunizations and preventive care screenings were discussed with patient today  Appropriate education was printed on patient's after visit summary  Counseling:  Dental Health: discussed importance of regular tooth brushing, flossing, and dental visits  · Exercise: the importance of regular exercise/physical activity was discussed  Recommend exercise 3-5 times per week for at least 30 minutes  No follow-ups on file  Chief Complaint:     Chief Complaint   Patient presents with    Follow-up     covid follow up, questions regarding covid(booster, return to work)      History of Present Illness:     Adult Annual Physical   Patient here for a comprehensive physical exam  The patient reports problems - persistent shortness of breath   (Please see separate note addressing this problem)    Diet and Physical Activity  · Diet/Nutrition: well balanced diet  · Exercise: no formal exercise  Depression Screening  PHQ-9 Depression Screening    PHQ-9:   Frequency of the following problems over the past two weeks:           General Health  · Sleep: sleeps well  · Hearing: normal - bilateral   · Vision: goes for regular eye exams  · Dental: no dental visits for >1 year  /GYN Health  · Patient is: postmenopausal       Review of Systems:     Review of Systems   Constitutional: Negative for appetite change, fatigue and fever  HENT: Negative for congestion and trouble swallowing  Respiratory: Positive for shortness of breath  Negative for cough and chest tightness  Cardiovascular: Negative for chest pain, palpitations and leg swelling  Gastrointestinal: Negative for abdominal pain, anal bleeding, constipation, diarrhea, nausea and vomiting  Genitourinary: Negative for dysuria, flank pain and hematuria  Musculoskeletal: Negative for back pain  Neurological: Negative for dizziness and headaches  Psychiatric/Behavioral: Negative for agitation and confusion  All other systems reviewed and are negative            Past Medical History:     Past Medical History:   Diagnosis Date    Diabetes mellitus ([address] )     Hypertension       Past Surgical History:     Past Surgical History:   Procedure Laterality Date    BREAST BIOPSY Right 2016    Done at [address] [place] [address] EXTRACTION        Social History:     Social History     Socioeconomic History    Marital status: /Civil Union     Spouse name: None    Number of children: None    Years of education: None    Highest education level: None   Occupational History    None   Tobacco Use    Smoking status: Former Smoker     Packs/day: 1 00     Years: 30 00     Pack years: 30 00     Types: Cigarettes    Smokeless tobacco: Never Used   Vaping Use    Vaping Use: Never used   Substance and Sexual Activity    Alcohol use: Not Currently    Drug use: Yes     Types: Marijuana     Comment: Has not used in a long time(edible)    Sexual activity: None   Other Topics Concern    None   Social History Narrative    Most recent tobacco use screenin2019      Do you currently or have you served in the [employer]:   No      Were you activated, into active duty, as a member of the [employer] or as a Reservist:   No     - As per [facility] [employer]     Financial Resource Strain:     Difficulty of Paying Living Expenses:    Food Insecurity:     Worried About [address] [employer] in the Last Year:    [address] in the Last Year:    Transportation Needs:     Lack of Transportation (Medical):      Lack of Transportation (Non-Medical):    Physical Activity:     Days of Exercise per Week:     Minutes of Exercise per Session:    Stress:     Feeling of Stress :    Social Connections:     Frequency of Communication with Friends and Family:     Frequency of Social Gatherings with Friends and Family:     Attends [employer] Services:     Active Member of Clubs or Organizations:     Attends Club or Organization Meetings:     Marital Status:    Intimate Partner Violence:     Fear of Current or Ex-Partner:     Emotionally Abused:     Physically Abused:     Sexually Abused:       Family History:     Family History   Problem Relation Age of Onset    Heart disease Mother     Diabetes Mother     Liver cancer Mother     Kidney cancer Mother     Heart disease Father     Colon cancer Father     Skin cancer Father     Stroke Brother       Current Medications:     Current Outpatient Medications   Medication Sig Dispense Refill    amLODIPine-benazepril (LOTREL) 5-40 MG per capsule Take 1 capsule by mouth daily 30 capsule 3    atorvastatin (LIPITOR) 40 mg tablet Take 1 tablet (40 mg total) by mouth daily after dinner 30 tablet 3    cholecalciferol (VITAMIN D3) 1,000 units tablet Take 1,000 Units by mouth daily      fluocinolone (SYNALAR) 0 025 % cream apply to affected area twice a day to THE FACE      furosemide (LASIX) 20 mg tablet take 1 tablet by mouth once daily if needed for SWELLING (Patient taking differently: 20 mg as needed ) 30 tablet 0    metFORMIN (GLUCOPHAGE) 500 mg tablet Take 2 tablets (1,000 mg total) by mouth 2 (two) times a day with meals 120 tablet 3    potassium chloride (K-DUR,KLOR-CON) 20 mEq tablet Take 1 tablet (20 mEq total) by mouth as needed (Take 1 tablet only when you take lasix) 30 tablet 5    albuterol (ProAir HFA) 90 mcg/act inhaler Inhale 2 puffs every 6 (six) hours as needed for wheezing (Patient not taking: Reported on 9/14/2021) 8 5 g 0    Ascorbic Acid (vitamin C) 1000 MG tablet Take 1 tablet (1,000 mg total) by mouth daily for 7 days 7 tablet 0    cholecalciferol (VITAMIN D3) 1,000 units tablet Take 2 tablets (2,000 Units total) by mouth daily for 7 days 14 tablet 0     No current facility-administered medications for this visit  Allergies:     No Known Allergies   Physical Exam:     /86 (BP Location: Left arm, Patient Position: Sitting, Cuff Size: Standard)   Pulse 85   Temp 98 7 °F (37 1 °C) (Tympanic)   Ht 5' 4" (1 626 m)   Wt 134 kg (295 lb 9 6 oz)   SpO2 97%   BMI 50 74 kg/m²     Physical Exam  Vitals reviewed  Constitutional:       General: She is not in acute distress  Appearance: She is not ill-appearing  Comments: On 2L O2 via nasal cannula   HENT:      Nose: No congestion  Eyes:      General: No scleral icterus  Cardiovascular:      Rate and Rhythm: Normal rate and regular rhythm  Pulmonary:      Effort: Pulmonary effort is normal  No respiratory distress  Breath sounds: No wheezing or rales  Abdominal:      General: Bowel sounds are normal  There is no distension  Tenderness: There is no abdominal tenderness  Musculoskeletal:      Right lower leg: Edema present        Left lower leg: Edema present  Skin:     Capillary Refill: Capillary refill takes less than 2 seconds  Neurological:      General: No focal deficit present  Mental Status: She is oriented to person, place, and time  Psychiatric:         Mood and Affect: Mood normal          Thought Content:  Thought content normal             Trav Camargo MD  [facility] INTERNAL MEDICINE [place]

## 2021-09-14 NOTE — LETTER
September 14, 2021     Patient: Monroe Skiff   YOB: 1960   Date of Visit: 9/14/2021       To Whom it May Concern:    Monroe Skiff is under my professional care  She was seen in my office on 9/14/2021  In my professional opinion, I would not recommend her to go back to work until 10/11/2021, with the possibility of extending this further depending on her clinical condition  If you have any questions or concerns, please don't hesitate to call           Sincerely,          Nghia Mascorro MD        CC: No Recipients

## 2021-09-15 ENCOUNTER — TELEPHONE (OUTPATIENT)
Dept: INTERNAL MEDICINE CLINIC | Facility: CLINIC | Age: 61
End: 2021-09-15

## 2021-09-15 NOTE — TELEPHONE ENCOUNTER
Spoke with patient, advised regarding symptoms of PE and DVT  No recommendation for VTE prophylaxis at this time  Patient verbalized understanding and all questions and concerns were addressed

## 2021-09-17 DIAGNOSIS — E11.9 TYPE 2 DIABETES MELLITUS WITHOUT COMPLICATION, WITHOUT LONG-TERM CURRENT USE OF INSULIN (HCC): ICD-10-CM

## 2021-09-17 DIAGNOSIS — M79.89 LEG SWELLING: ICD-10-CM

## 2021-09-20 RX ORDER — FUROSEMIDE 20 MG/1
TABLET ORAL
Qty: 30 TABLET | Refills: 0 | Status: SHIPPED | OUTPATIENT
Start: 2021-09-20 | End: 2022-03-30

## 2021-09-20 RX ORDER — ATORVASTATIN CALCIUM 40 MG/1
TABLET, FILM COATED ORAL
Qty: 30 TABLET | Refills: 3 | Status: SHIPPED | OUTPATIENT
Start: 2021-09-20 | End: 2022-06-23

## 2021-09-21 ENCOUNTER — OFFICE VISIT (OUTPATIENT)
Dept: PULMONOLOGY | Facility: CLINIC | Age: 61
End: 2021-09-21
Payer: COMMERCIAL

## 2021-09-21 VITALS
SYSTOLIC BLOOD PRESSURE: 128 MMHG | HEIGHT: 64 IN | BODY MASS INDEX: 50.02 KG/M2 | TEMPERATURE: 98.1 F | DIASTOLIC BLOOD PRESSURE: 84 MMHG | OXYGEN SATURATION: 98 % | WEIGHT: 293 LBS | HEART RATE: 79 BPM

## 2021-09-21 DIAGNOSIS — U09.9 POST-COVID-19 CONDITION: ICD-10-CM

## 2021-09-21 DIAGNOSIS — Z99.81 CHRONIC RESPIRATORY FAILURE WITH HYPOXIA, ON HOME O2 THERAPY (HCC): Primary | ICD-10-CM

## 2021-09-21 DIAGNOSIS — E66.01 MORBID OBESITY (HCC): ICD-10-CM

## 2021-09-21 DIAGNOSIS — G47.33 OSA (OBSTRUCTIVE SLEEP APNEA): ICD-10-CM

## 2021-09-21 DIAGNOSIS — J96.11 CHRONIC RESPIRATORY FAILURE WITH HYPOXIA, ON HOME O2 THERAPY (HCC): Primary | ICD-10-CM

## 2021-09-21 PROCEDURE — 1036F TOBACCO NON-USER: CPT | Performed by: INTERNAL MEDICINE

## 2021-09-21 PROCEDURE — 99215 OFFICE O/P EST HI 40 MIN: CPT | Performed by: INTERNAL MEDICINE

## 2021-09-21 PROCEDURE — 3008F BODY MASS INDEX DOCD: CPT | Performed by: INTERNAL MEDICINE

## 2021-09-21 NOTE — PROGRESS NOTES
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, post COVID pneumonitis  · urrently requiring 2 L nasal cannula, she has a pulse oximeter at home and does report desaturations at home with activity to the mid to high 80s  · She has only 2-3 weeks out from her infection, recommend to continue supplemental oxygen at this time, discussed that Timur, it takes time for recovery  · Will plan to perform 6 minutes walk test during next appointment in a few weeks based on her symptoms, progression recovery, we need to proceed with additional testing such as pulmonary function testing and CT scan imaging to assess her lung parenchyma  ANTONETTE (obstructive sleep apnea)  · Sleep study performed in June 2018 shows evidence of moderate sleep apnea  Was recommended that she initiate CPAP 13 cm H2O  ·  has not been compliant with her CPAP therapy, actually returned her machine secondary to the recall  ·  will need to proceed with repeat sleep study assessment to assess her PAP therapy needs, will schedule this for a few months as she is still recovering from her COVID infection    Morbid obesity ([address] )  · BMI 50,  Most certainly contributing to her overall breathing and deconditioning  Encouraged her to continue with diet / exercise efforts to try and improve weight as this will help her overall breathing    Health Maintenance  Immunization History   Administered Date(s) Administered    Pneumococcal Polysaccharide PPV23 02/22/2021    SARS-CoV-2 / COVID-19 mRNA IM ([employer]) 03/27/2021, 04/17/2021    Tdap 09/14/2021       CT Lung Cancer Screening:    No follow-ups on file  History of Present Illness   HPI:  Zully Fields is a 62 y o  female with a PMHX of DM2, HLD, HTN, ANTONETTE who had COVID in August 2021 and presents today for follow-up      Ana has History of obstructive sleep apnea and has followed up with us previously a few years ago  She was on CPAP therapy however has not been compliant with this  She unfortunately had COVID in August of this year, she was vaccinated and was admitted to [facility]   She had a stay in the ICU where she required high-flow oxygen and received COVID directed treatments  She was discharged on oxygen, 4 L and this has since been weaned down to 2 L which she is wearing both at rest and also on exertion  She says that she feels like she is getting better but is still short of breath when she does any significant activity  She has not used her CPAP in some time, she actually did return her machine secondary to the recall  Review of Systems   Constitutional: Negative for activity change, chills, fever and unexpected weight change  HENT: Negative for congestion, rhinorrhea and voice change  Respiratory: Positive for shortness of breath  Negative for cough, chest tightness and wheezing  Cardiovascular: Negative for chest pain and palpitations  Gastrointestinal: Negative for abdominal distention, constipation, diarrhea, nausea and vomiting  Endocrine: Negative for cold intolerance and heat intolerance  Genitourinary: Negative for dysuria, frequency and urgency  Musculoskeletal: Negative for arthralgias, joint swelling and myalgias  Skin: Negative for color change, pallor and rash  Neurological: Negative for dizziness, weakness and numbness  Psychiatric/Behavioral: Negative for agitation and confusion  The patient is not nervous/anxious          Historical Information   Past Medical History:   Diagnosis Date    Diabetes mellitus ([address] )     Hypertension      Past Surgical History:   Procedure Laterality Date    BREAST BIOPSY Right 2016    Done at [address] [place] [address] EXTRACTION       Family History   Problem Relation Age of Onset    Heart disease Mother     Diabetes Mother  Liver cancer Mother     Kidney cancer Mother     Heart disease Father     Colon cancer Father     Skin cancer Father     Stroke Brother          Meds/Allergies     Current Outpatient Medications:     albuterol (ProAir HFA) 90 mcg/act inhaler, Inhale 2 puffs every 6 (six) hours as needed for wheezing (Patient not taking: Reported on 9/14/2021), Disp: 8 5 g, Rfl: 0    amLODIPine-benazepril (LOTREL) 5-40 MG per capsule, Take 1 capsule by mouth daily, Disp: 30 capsule, Rfl: 3    Ascorbic Acid (vitamin C) 1000 MG tablet, Take 1 tablet (1,000 mg total) by mouth daily for 7 days, Disp: 7 tablet, Rfl: 0    atorvastatin (LIPITOR) 40 mg tablet, take 1 tablet by mouth once daily AFTER DINNER, Disp: 30 tablet, Rfl: 3    cholecalciferol (VITAMIN D3) 1,000 units tablet, Take 1,000 Units by mouth daily, Disp: , Rfl:     cholecalciferol (VITAMIN D3) 1,000 units tablet, Take 2 tablets (2,000 Units total) by mouth daily for 7 days, Disp: 14 tablet, Rfl: 0    fluocinolone (SYNALAR) 0 025 % cream, apply to affected area twice a day to THE FACE, Disp: , Rfl:     furosemide (LASIX) 20 mg tablet, take 1 tablet by mouth once daily if needed for SWELLING, Disp: 30 tablet, Rfl: 0    metFORMIN (GLUCOPHAGE) 500 mg tablet, Take 2 tablets (1,000 mg total) by mouth 2 (two) times a day with meals, Disp: 120 tablet, Rfl: 3    potassium chloride (K-DUR,KLOR-CON) 20 mEq tablet, Take 1 tablet (20 mEq total) by mouth as needed (Take 1 tablet only when you take lasix), Disp: 30 tablet, Rfl: 5  No Known Allergies    Vitals: There were no vitals taken for this visit  There is no height or weight on file to calculate BMI  Physical Exam  Physical Exam  Vitals reviewed  Constitutional:       Appearance: Normal appearance  She is obese  She is not ill-appearing or toxic-appearing  HENT:      Head: Normocephalic and atraumatic  Nose: Nose normal  No congestion or rhinorrhea        Mouth/Throat:      Mouth: Mucous membranes are moist       Pharynx: Oropharynx is clear  Cardiovascular:      Rate and Rhythm: Normal rate and regular rhythm  Pulses: Normal pulses  Heart sounds: Normal heart sounds  Pulmonary:      Effort: Pulmonary effort is normal  No respiratory distress  Breath sounds: Normal breath sounds  No wheezing, rhonchi or rales  Abdominal:      General: Abdomen is flat  Bowel sounds are normal  There is no distension  Palpations: Abdomen is soft  Tenderness: There is no abdominal tenderness  There is no guarding  Musculoskeletal:         General: No swelling or tenderness  Normal range of motion  Cervical back: Normal range of motion and neck supple  Right lower leg: No edema  Left lower leg: No edema  Skin:     General: Skin is warm and dry  Findings: No rash  Neurological:      General: No focal deficit present  Mental Status: She is alert and oriented to person, place, and time  Mental status is at baseline  Psychiatric:         Mood and Affect: Mood normal          Behavior: Behavior normal          Labs: I have personally reviewed pertinent lab results  Lab Results   Component Value Date    WBC 6 54 02/27/2021    HGB 13 7 02/27/2021    HCT 44 3 02/27/2021    MCV 87 02/27/2021     02/27/2021     Lab Results   Component Value Date    CALCIUM 9 2 02/27/2021    K 4 7 02/27/2021    CO2 31 02/27/2021     02/27/2021    BUN 19 02/27/2021    CREATININE 0 89 02/27/2021     No results found for: IGE  Lab Results   Component Value Date    ALT 40 02/27/2021    AST 19 02/27/2021    ALKPHOS 66 02/27/2021       Imaging and other studies: I have personally reviewed pertinent reports  and I have personally reviewed pertinent films in PACS    Pulmonary function testing:   No PFTS in system    EKG, Pathology, and Other Studies: I have personally reviewed pertinent reports  and I have personally reviewed pertinent films in PACS      OLGA Blackwood    [facility]

## 2021-09-23 ENCOUNTER — TELEPHONE (OUTPATIENT)
Dept: SLEEP CENTER | Facility: CLINIC | Age: 61
End: 2021-09-23

## 2021-09-23 NOTE — TELEPHONE ENCOUNTER
----- Message from Rachel Riggins MD sent at 9/23/2021 12:36 PM EDT -----  approved  ----- Message -----  From: Elena Juárez  Sent: 9/22/2021   9:30 AM EDT  To: Sleep Medicine [place] Provider    This sleep study needs approval      If approved please sign and return to clerical pool  If denied please include reasons why  Also provide alternative testing if warranted  Please sign and return to clerical pool

## 2021-10-04 ENCOUNTER — OFFICE VISIT (OUTPATIENT)
Dept: INTERNAL MEDICINE CLINIC | Facility: CLINIC | Age: 61
End: 2021-10-04
Payer: COMMERCIAL

## 2021-10-04 VITALS
OXYGEN SATURATION: 97 % | DIASTOLIC BLOOD PRESSURE: 84 MMHG | WEIGHT: 293 LBS | HEIGHT: 64 IN | HEART RATE: 87 BPM | BODY MASS INDEX: 50.02 KG/M2 | TEMPERATURE: 97.3 F | SYSTOLIC BLOOD PRESSURE: 132 MMHG

## 2021-10-04 DIAGNOSIS — R41.89 IMPAIRED COGNITION: ICD-10-CM

## 2021-10-04 DIAGNOSIS — Z99.81 CHRONIC RESPIRATORY FAILURE WITH HYPOXIA, ON HOME O2 THERAPY (HCC): ICD-10-CM

## 2021-10-04 DIAGNOSIS — E11.9 TYPE 2 DIABETES MELLITUS WITHOUT COMPLICATION, WITHOUT LONG-TERM CURRENT USE OF INSULIN (HCC): ICD-10-CM

## 2021-10-04 DIAGNOSIS — J96.11 CHRONIC RESPIRATORY FAILURE WITH HYPOXIA, ON HOME O2 THERAPY (HCC): ICD-10-CM

## 2021-10-04 DIAGNOSIS — U09.9 POST-COVID-19 CONDITION: Primary | ICD-10-CM

## 2021-10-04 LAB
25(OH)D3 SERPL-MCNC: 26.1 NG/ML (ref 30–100)
TSH SERPL DL<=0.05 MIU/L-ACNC: 2.53 UIU/ML (ref 0.36–3.74)
VIT B12 SERPL-MCNC: 389 PG/ML (ref 100–900)

## 2021-10-04 PROCEDURE — 36415 COLL VENOUS BLD VENIPUNCTURE: CPT | Performed by: INTERNAL MEDICINE

## 2021-10-04 PROCEDURE — 82306 VITAMIN D 25 HYDROXY: CPT | Performed by: INTERNAL MEDICINE

## 2021-10-04 PROCEDURE — 82607 VITAMIN B-12: CPT | Performed by: INTERNAL MEDICINE

## 2021-10-04 PROCEDURE — 99214 OFFICE O/P EST MOD 30 MIN: CPT | Performed by: INTERNAL MEDICINE

## 2021-10-04 PROCEDURE — 84443 ASSAY THYROID STIM HORMONE: CPT | Performed by: INTERNAL MEDICINE

## 2021-10-04 RX ORDER — METFORMIN HYDROCHLORIDE 500 MG/1
1000 TABLET, EXTENDED RELEASE ORAL 2 TIMES DAILY WITH MEALS
Qty: 120 TABLET | Refills: 1 | Status: SHIPPED | OUTPATIENT
Start: 2021-10-04 | End: 2022-02-23 | Stop reason: SDUPTHER

## 2021-10-04 RX ORDER — MELATONIN 10 MG
10 CAPSULE ORAL
COMMUNITY

## 2021-10-07 ENCOUNTER — TELEPHONE (OUTPATIENT)
Dept: INTERNAL MEDICINE CLINIC | Facility: CLINIC | Age: 61
End: 2021-10-07

## 2021-10-07 DIAGNOSIS — E55.9 VITAMIN D DEFICIENCY: Primary | ICD-10-CM

## 2021-10-15 ENCOUNTER — OFFICE VISIT (OUTPATIENT)
Dept: INTERNAL MEDICINE CLINIC | Facility: CLINIC | Age: 61
End: 2021-10-15
Payer: COMMERCIAL

## 2021-10-15 VITALS
HEIGHT: 64 IN | HEART RATE: 75 BPM | SYSTOLIC BLOOD PRESSURE: 140 MMHG | OXYGEN SATURATION: 98 % | DIASTOLIC BLOOD PRESSURE: 80 MMHG | WEIGHT: 293 LBS | BODY MASS INDEX: 50.02 KG/M2 | TEMPERATURE: 97.5 F

## 2021-10-15 DIAGNOSIS — R05.8 RECURRENT DRY COUGH: ICD-10-CM

## 2021-10-15 DIAGNOSIS — E66.01 MORBID OBESITY (HCC): ICD-10-CM

## 2021-10-15 DIAGNOSIS — I10 ESSENTIAL HYPERTENSION: ICD-10-CM

## 2021-10-15 DIAGNOSIS — J96.11 CHRONIC RESPIRATORY FAILURE WITH HYPOXIA, ON HOME O2 THERAPY (HCC): ICD-10-CM

## 2021-10-15 DIAGNOSIS — Z99.81 CHRONIC RESPIRATORY FAILURE WITH HYPOXIA, ON HOME O2 THERAPY (HCC): ICD-10-CM

## 2021-10-15 DIAGNOSIS — U09.9 POST-COVID-19 CONDITION: ICD-10-CM

## 2021-10-15 DIAGNOSIS — E11.9 TYPE 2 DIABETES MELLITUS WITHOUT COMPLICATION, WITHOUT LONG-TERM CURRENT USE OF INSULIN (HCC): ICD-10-CM

## 2021-10-15 DIAGNOSIS — Z23 NEED FOR INFLUENZA VACCINATION: Primary | ICD-10-CM

## 2021-10-15 PROCEDURE — 3077F SYST BP >= 140 MM HG: CPT | Performed by: HOSPITALIST

## 2021-10-15 PROCEDURE — 99213 OFFICE O/P EST LOW 20 MIN: CPT | Performed by: HOSPITALIST

## 2021-10-15 PROCEDURE — 90682 RIV4 VACC RECOMBINANT DNA IM: CPT | Performed by: HOSPITALIST

## 2021-10-15 PROCEDURE — 90471 IMMUNIZATION ADMIN: CPT | Performed by: HOSPITALIST

## 2021-10-15 PROCEDURE — 3079F DIAST BP 80-89 MM HG: CPT | Performed by: HOSPITALIST

## 2021-10-20 ENCOUNTER — OFFICE VISIT (OUTPATIENT)
Dept: PULMONOLOGY | Facility: CLINIC | Age: 61
End: 2021-10-20
Payer: COMMERCIAL

## 2021-10-20 VITALS
DIASTOLIC BLOOD PRESSURE: 96 MMHG | BODY MASS INDEX: 50.02 KG/M2 | HEART RATE: 101 BPM | SYSTOLIC BLOOD PRESSURE: 150 MMHG | HEIGHT: 64 IN | WEIGHT: 293 LBS | OXYGEN SATURATION: 96 % | TEMPERATURE: 97.6 F

## 2021-10-20 DIAGNOSIS — G47.33 OSA (OBSTRUCTIVE SLEEP APNEA): ICD-10-CM

## 2021-10-20 DIAGNOSIS — E66.01 MORBID OBESITY (HCC): ICD-10-CM

## 2021-10-20 DIAGNOSIS — U09.9 POST-COVID-19 CONDITION: Primary | ICD-10-CM

## 2021-10-20 PROCEDURE — 99214 OFFICE O/P EST MOD 30 MIN: CPT | Performed by: INTERNAL MEDICINE

## 2021-10-20 PROCEDURE — 3008F BODY MASS INDEX DOCD: CPT | Performed by: INTERNAL MEDICINE

## 2021-10-20 PROCEDURE — 1036F TOBACCO NON-USER: CPT | Performed by: INTERNAL MEDICINE

## 2021-10-20 PROCEDURE — 94618 PULMONARY STRESS TESTING: CPT | Performed by: INTERNAL MEDICINE

## 2021-10-22 ENCOUNTER — TELEPHONE (OUTPATIENT)
Dept: PULMONOLOGY | Facility: CLINIC | Age: 61
End: 2021-10-22

## 2021-11-04 ENCOUNTER — HOSPITAL ENCOUNTER (OUTPATIENT)
Dept: PULMONOLOGY | Facility: HOSPITAL | Age: 61
Discharge: HOME | End: 2021-11-04
Attending: INTERNAL MEDICINE
Payer: COMMERCIAL

## 2021-11-04 DIAGNOSIS — U09.9 POST-COVID-19 CONDITION: ICD-10-CM

## 2021-11-04 PROCEDURE — 94060 EVALUATION OF WHEEZING: CPT

## 2021-11-04 PROCEDURE — 94060 EVALUATION OF WHEEZING: CPT | Performed by: INTERNAL MEDICINE

## 2021-11-04 PROCEDURE — 94726 PLETHYSMOGRAPHY LUNG VOLUMES: CPT | Performed by: INTERNAL MEDICINE

## 2021-11-04 PROCEDURE — 94729 DIFFUSING CAPACITY: CPT | Performed by: INTERNAL MEDICINE

## 2021-11-04 PROCEDURE — 94726 PLETHYSMOGRAPHY LUNG VOLUMES: CPT

## 2021-11-04 PROCEDURE — 94760 N-INVAS EAR/PLS OXIMETRY 1: CPT

## 2021-11-04 PROCEDURE — 94729 DIFFUSING CAPACITY: CPT

## 2021-11-04 RX ORDER — ALBUTEROL SULFATE 2.5 MG/3ML
2.5 SOLUTION RESPIRATORY (INHALATION) ONCE
Status: COMPLETED | OUTPATIENT
Start: 2021-11-04 | End: 2021-11-04

## 2021-11-04 RX ADMIN — ALBUTEROL SULFATE 2.5 MG: 2.5 SOLUTION RESPIRATORY (INHALATION) at 16:43

## 2021-12-07 ENCOUNTER — OFFICE VISIT (OUTPATIENT)
Dept: PULMONOLOGY | Facility: CLINIC | Age: 61
End: 2021-12-07
Payer: COMMERCIAL

## 2021-12-07 VITALS
SYSTOLIC BLOOD PRESSURE: 138 MMHG | BODY MASS INDEX: 50.02 KG/M2 | OXYGEN SATURATION: 97 % | TEMPERATURE: 97.6 F | HEIGHT: 64 IN | DIASTOLIC BLOOD PRESSURE: 86 MMHG | HEART RATE: 79 BPM | WEIGHT: 293 LBS

## 2021-12-07 DIAGNOSIS — J98.4 RESTRICTIVE LUNG DISEASE: ICD-10-CM

## 2021-12-07 DIAGNOSIS — G47.33 OSA (OBSTRUCTIVE SLEEP APNEA): ICD-10-CM

## 2021-12-07 DIAGNOSIS — U07.1 COVID-19: Primary | ICD-10-CM

## 2021-12-07 DIAGNOSIS — E66.01 MORBID OBESITY (HCC): ICD-10-CM

## 2021-12-07 PROBLEM — Z99.81 CHRONIC RESPIRATORY FAILURE WITH HYPOXIA, ON HOME O2 THERAPY (HCC): Status: RESOLVED | Noted: 2021-09-02 | Resolved: 2021-12-07

## 2021-12-07 PROBLEM — J96.11 CHRONIC RESPIRATORY FAILURE WITH HYPOXIA, ON HOME O2 THERAPY (HCC): Status: RESOLVED | Noted: 2021-09-02 | Resolved: 2021-12-07

## 2021-12-07 PROCEDURE — 1036F TOBACCO NON-USER: CPT | Performed by: INTERNAL MEDICINE

## 2021-12-07 PROCEDURE — 99214 OFFICE O/P EST MOD 30 MIN: CPT | Performed by: INTERNAL MEDICINE

## 2021-12-07 PROCEDURE — 3079F DIAST BP 80-89 MM HG: CPT | Performed by: INTERNAL MEDICINE

## 2021-12-07 PROCEDURE — 3075F SYST BP GE 130 - 139MM HG: CPT | Performed by: INTERNAL MEDICINE

## 2021-12-07 PROCEDURE — 3008F BODY MASS INDEX DOCD: CPT | Performed by: INTERNAL MEDICINE

## 2021-12-11 ENCOUNTER — IMMUNIZATIONS (OUTPATIENT)
Dept: FAMILY MEDICINE CLINIC | Facility: HOSPITAL | Age: 61
End: 2021-12-11

## 2021-12-11 DIAGNOSIS — Z23 ENCOUNTER FOR IMMUNIZATION: Primary | ICD-10-CM

## 2021-12-11 PROCEDURE — 91300 COVID-19 PFIZER VACC 0.3 ML: CPT

## 2021-12-11 PROCEDURE — 0001A COVID-19 PFIZER VACC 0.3 ML: CPT

## 2022-01-06 ENCOUNTER — TELEPHONE (OUTPATIENT)
Dept: PULMONOLOGY | Facility: CLINIC | Age: 62
End: 2022-01-06

## 2022-01-07 ENCOUNTER — CLINICAL SUPPORT (OUTPATIENT)
Dept: PULMONOLOGY | Facility: CLINIC | Age: 62
End: 2022-01-07

## 2022-01-07 DIAGNOSIS — J98.4 RESTRICTIVE LUNG DISEASE: ICD-10-CM

## 2022-01-07 NOTE — PROGRESS NOTES
[place]        Dear Dr Betty Martins,    Emotional well-being and depression is addressed in the pulmonary rehab evaluation  To assess the severity of depression, patients are given the PHQ-9 Depression Questionnaire  This is to inform you that your patient Ross Zuniga scored a 10 which is interpreted as 10-14 = Moderate Depression  Your patient was provided contact information for [facility] and has been encouraged to enroll in [employer] [facility]  A repeat PHQ -9 will be administered in 30 days to assess improvement  Thank you for your support of cardiopulmonary rehab      Sincerely,      Siddharth Pino MS, CEP

## 2022-01-07 NOTE — PROGRESS NOTES
Pulmonary Rehabilitation Plan of Care   Initial Care Plan      Today's date: 2022   # of Exercise Sessions Completed: 1 - evaluation   Patient name: Tim Sagastume      : 1960  Age: 62 y o  MRN: [id number]  Referring Physician: Cornel Rosen MD  Pulmonologist: Dr Sybil Bautista   Provider: [place]  Clinician: Blanca Delarosa MS, CEP     Dx:   Encounter Diagnosis   Name Primary?  Restrictive lung disease      Date of onset: 2021      SUMMARY OF PROGRESS:  Today is Ana Gonzalez's initial evaluation to begin Pulmonary Rehab for the diagnosis of Restrictive Lung Disease due to COVID-19 infection  Patient does have a PFT on file, revealing an FEV1/FVC ratio of 86% and an FEV1 of 1 92  This is suggestive of mild restriction  Since their diagnosis, the patient has been experiencing increased dyspnea, increased sitting time, decreased physical activity and decreased muscle mass  She report dyspnea when completing moderate ADLs   The patient currently does not follow a formal exercise program at home  She works from home and reports very little activity  Depression screening using the PHQ-9 interprets the patient's score 10-14 = Moderate Depression  ERIC-7 screening tool for anxiety suggests 0-4  = Not anxious  When addressed, the patient admits to having depression/anxiety  Patient reports excellent social/emotional support  Information to begin using [facility] was provided as well as contact information for counseling through [facility]  PHQ-9 score will be reassessed in 30 days  The patient is a former smoker (quit 15 years ago)  Patient admits to 100% medication compliance  At rest, the patient rated dyspnea 0/10 with SpO2 95% on room air  They completed an initial 6MWT, walking 1170 ft on room air  The patients rating of perceived dyspnea during the 6MWT was 3/10 with SpO2 90-92%  Patient took 0 rest breaks  Telemetry revealed NSR     Resting  /68 with appropriate hemodynamic response to exercise reaching 148/88  Patient will exercise on room air  Education on smoking cessation, oxygen use, breathing techniques, pulmonary anatomy, exercise for the pulmonary patient, healthy eating, stress, and relaxation will be provided  Patient goals include: get back to exercise routine, lose weight, decrease SOB with stairs, and overall feel better  She will attend 24 exercise sessions, 2x/wk for 12-18 after she talks with her  regarding plan for payments  She will call department next week to schedule start date  Will progress patient as tolerated over the next 30 days        Medication compliance: Yes   Comments: Pt reports to be compliant with medications  Fall Risk: Low   Comments: Ambulates with a steady gait with no assist device    Smoking: Former user    RPD at Rest: 010  RPD with Exercise:  3/10    Assessment of progression of lung disease and functional status:  CAT: N/A  Shortness of breath questionnaire:       EXERCISE ASSESSMENT and PLAN    Current Exercise Program in Rehab:       Frequency: 1 days/week        Minutes: 30         METS: 2 7              SpO2: >90% SpO2               RPD: 4-6                      HR: RHR +30 beats above resting   RPE: 4-5         Modalities: Treadmill, UBE, Lifecycle, NuStep and Recumbent bike      Exercise Progression 30 Day Goals :    Frequency: 2 days/week        Minutes: 30-35         METS: 2 5-3 0              SpO2: >90% SpO2               RPD: 4-6                      HR: RHR +30 beats above resting   RPE: 4-5        Modalities: Treadmill, UBE, Lifecycle, NuStep and Recumbent bike     Strength trainin-3 days / week  12-15 repetitions  1-2 sets per modality    Modalities: Leg Press, Chest Press, Lateral Raise, Arm Extension, Arm Curl, Sit to Stands and Shoulder Shrugs    Oxygen Needs: on room air at rest and on room air with exercise  Oxygen Goal: Maintain SpO2>90% during exercise    Home Exercise: none  Education: pursed lipped breathing, diaphragmatic breathing, relaxation breathing, home exercise, benefits of exercise for pulmonary disease, RPE scale, RPD scale, O2 saturation monitoring, appropriate O2 response to exercise, energy conservation and education class: Exercise For The Pulmonary Patient    Goals: reduced score on  USCD Shortness of Breath Questionnaire, Improved 6MW distance by 10%, reduced dyspnea during exercise (0-3/10), improved exercise tolerance (max METs tolerated in pulmonary rehab), SpO2 >90% during exercise, improved DUKE activity score, reduced RPD at rest, attend pulmonary rehab regularly, decrease sitting time at home and start a walking program  Progressing:  Reviewed Pt goals and determined plan of care    Plan: learn to conserve energy with ADLs , practice breathing techniques 3x/day and reduce time sitting at home    Readiness to change: Contemplation:  (Acknowledging that there is a problem but not yet ready or sure of wanting to make a change)      NUTRITION ASSESSMENT AND PLAN    Weight control:    Starting weight: 302   Current weight:       Diabetes: Type 2 diabetes, MD told patient she does not need to monitor at home but has meter if needed    Goals:reduced BMI to < 25, decreased body fat % <33%  (F), Improved Rate Your Plate score  >17, Wt  loss 1-2 ppw,  goal of 25 lbs , 2 5-5%  wt loss, eliminate processed meats, reduce portion sizes of meat to 3oz or less, increase intake of fish, shellfish, use low fat dairy, reduce cheese intake or use reduced-fat, Eat 4-5 cups of fruits and vegetables daily, eliminate butter, use fat-free dressings/pritchard or seldom use, Increase intake of nuts and seeds, seldom eat or choose low fat ice-cream, fruit juice bars or frozen yogurt  and seldom eat out or choose lower fat menu items  Education: heart healthy eating  low sodium diet  hydration  exercise and diabetes management   wt  loss   healthy choices while dining out  portion control  education class: healthy choices for managing pulmonary illness  Progressing:Reviewed Pt goals and determined plan of care  Plan: Education Class: Heart Healthy Eating, refer to medical nutrition therapy, switch to low fat cheeses, replace butter with soft spreads made with olive oil, canola or yogurt, replace unhealthy snacks with fruits & vegs, reduce portion sizes, avoid processed foods, eat more home cooked meals and eat out less often, drink more water, learn how to read food labels and decrease carbonated beverages  Readiness to change: Preparation:  (Getting ready to change)       PSYCHOSOCIAL ASSESSMENT AND PLAN    Emotional:  Depression assessment:  PHQ-9 = 10-14 = Moderate Depression            Anxiety measure:  ERIC-7 = 0-4  = Not anxious  Self-reported stress level: 8   Social support: Good     Goals:  Reduce perceived stress to 1-3/10, improved [facility] QOL < 27, PHQ-9 - reduced severity by one level, Physical Fitness in [facility] Score < 3, Social Support in [facility] Score < 3, Daily Activity in [facility] Score < 3, Social Activities in [facility] Score < 3, Overall Health in [facility] Score < 3, Quality of Life in [facility] Score < 3 , Change in Health in [place] Score < 3 , Increased interest in doing things, improved positive thoughts of well being, increased energy, stop worrying, take time to relax and Feel less anxious  Education: signs/sxs of depression, benefits of a positive support system, stress management techniques, depression and CAD and class:  Stress and Pulmonary Disease    Progressing:Reviewed Pt goals and determined plan of care  Plan: Class: Stress and Your Health, Class: Relaxation, Refer to behavioral health/counseling, Refer to [facility], Practice relaxation techniques, Exercise, Enjoy a hobby, Read a Book, Keep a positive mindset, Learn how to relax and slow down, Enjoy family and Repeat PHQ-9 every 30 days if score >5  Readiness to change: Contemplation:  (Acknowledging that there is a problem but not yet ready or sure of wanting to make a change)      OTHER CORE COMPONENTS     Tobacco:   Social History     Tobacco Use   Smoking Status Former Smoker    Packs/day: 1 00    Years: 30 00    Pack years: 30 00    Types: Cigarettes    Quit date:     Years since quittin 0   Smokeless Tobacco Never Used       Tobacco Use Intervention: Referral to tobacco expert:   Pt quit 15   and has abstained    Blood pressure:    Restin/68   Exercise: 148/88    Goals: consistent BP < 130/80, reduced dietary sodium <2300mg, moderate intensity exercise >150 mins/wk and medication compliance  Education:  pathophysiology of pulmonary disease, preventing infections, control coughing and environmental triggers  Progressing:Reviewed Pt goals and determined plan of care  Plan: Avoid Processed foods, engage in regular exercise, check labels for sodium content and monitor home BP  Readiness to change: Preparation:  (Getting ready to change)

## 2022-01-07 NOTE — PROGRESS NOTES
PULMONARY REHAB ASSESSMENT    Today's date: 2022  Patient name: Diego Thomas     : 1960       MRN: [id number]  PCP: Zaina Driscoll MD  Referring Physician: Simran Buckley MD  Pulmonologist: Dr Nico Luna     Dx: Restrictive Lung Disease       Date of onset: 2021  Cultural needs: N/A     Weight:    Wt Readings from Last 1 Encounters:   21 (!) 137 kg (302 lb 9 6 oz)      Height:   Ht Readings from Last 1 Encounters:   21 5' 4" (1 626 m)     Medical History:   Past Medical History:   Diagnosis Date    Diabetes mellitus [address] [address] [address] )     Hypertension     Pneumonia 2031    Sleep apnea, obstructive          Physical Limitations: None     Oxygen needs: None     History of Toxic Exposure:  None    Risk Factors   Cholesterol: No  Smoking: Former user  HTN: Yes  DM: Type 2   Obesity: Yes   Inactivity: Yes  Stress:  perceived  stress: 8/10   Stressors: house hunting, situations with health of son, caregivers,  physically not well    Goals for Stress Management: Read a book, positive mindset, seek therapy, find a hobby, exercise more     Family History:  Family History   Problem Relation Age of Onset    Heart disease Mother     Diabetes Mother     Liver cancer Mother     Kidney cancer Mother     Cancer Mother     Heart disease Father     Colon cancer Father     Skin cancer Father     Stroke Brother        Allergies: Patient has no known allergies    ETOH:   Social History     Substance and Sexual Activity   Alcohol Use Not Currently    Alcohol/week: 0 0 standard drinks         Current Medications:   Current Outpatient Medications   Medication Sig Dispense Refill    amLODIPine-benazepril (LOTREL) 5-40 MG per capsule Take 1 capsule by mouth daily 30 capsule 3    atorvastatin (LIPITOR) 40 mg tablet take 1 tablet by mouth once daily AFTER DINNER 30 tablet 3    cholecalciferol (VITAMIN D3) 1,000 units tablet Take 1,000 Units by mouth daily      cholecalciferol (VITAMIN D3) 1,000 units tablet Take 2 tablets (2,000 Units total) by mouth daily for 7 days 14 tablet 0    fluocinolone (SYNALAR) 0 025 % cream apply to affected area twice a day to THE FACE      furosemide (LASIX) 20 mg tablet take 1 tablet by mouth once daily if needed for SWELLING 30 tablet 0    Melatonin 10 MG CAPS Take 10 mg by mouth daily at bedtime as needed      metFORMIN (GLUCOPHAGE-XR) 500 mg 24 hr tablet Take 2 tablets (1,000 mg total) by mouth 2 (two) times a day with meals 120 tablet 1    potassium chloride (K-DUR,KLOR-CON) 20 mEq tablet Take 1 tablet (20 mEq total) by mouth as needed (Take 1 tablet only when you take lasix) 30 tablet 5     No current facility-administered medications for this visit  Functional Status Prior to Diagnosis for Treatment   Occupation: full time job Medical billing   Recreation: Read, hanging out with friends, painting   ADLs: No limitations  [place]: No limitations  Exercise:  Attended gym   Other: N/A    Current Functional Status  Occupation: full time job medical billing   Recreation: Reading, painting   ADLs:Capable of performing light to moderate ADLs  [place]: No limitations  Exercise: None   Other: Sedentary due to working from home     Patient Specific Goals: get back to exercise routine, feel better, lose weight  lbs, walk up stairs without SOB     Short Term Program Goals: dietary modifications increased strength improved energy/stamina with ADLs exercise 120-150 mins/wk wt loss 1-2 ppw    Long Term Goals: increased maximal walking duration  increased intial training workload  Improved [facility] Activity Status score  Improved functional capacity  Improved Quality of Life - [facility] score reduced  Reduced body fat%  Reduced waist circumference  Reduced stress  weight loss goal of ST 25 lbs,  lbs   improved Rate Your Plate Score    Oxygen Goals: Maintain SpO2>90% titrating supplemental oxygen as needed     Ability to reach goals/rehabilitation potential:  Very Good     Projected return to function: 8-12 weeks  Objective tests: 6 MWT      Nutritional   Reviewed details of Rate your Plate  Discussed key elements of heart healthy eating  Reviewed patient goals for dietary modifications and their clinical implications  Reviewed most recent lipid profile  Goals for dietary modification: low fat ground meat and poultry  eliminate processed meats  reduce portions of meat to 3 oz  increase fish intake  low fat dairy   reduced fat cheese  increase fruits and vegetables  eliminate butter  more nuts/seeds  reduce sweets/frozen desserts  heathier choices while dining out      Emotional/Social  Patient reports feelings of depression   Reports sufficient emotional support  Does not get out due to pandemic worries  Health of son causes lots of stress for her     SOCIAL SUPPORT NETWORK    Marital status:       Domestic Violence Screening: No    Comments: Patient deals with lots of stress due to son's health and husbands health  She works full time and is trying to find a new home

## 2022-01-12 ENCOUNTER — TELEPHONE (OUTPATIENT)
Dept: PULMONOLOGY | Facility: CLINIC | Age: 62
End: 2022-01-12

## 2022-01-17 ENCOUNTER — TELEPHONE (OUTPATIENT)
Dept: PULMONOLOGY | Facility: CLINIC | Age: 62
End: 2022-01-17

## 2022-01-17 DIAGNOSIS — I10 ESSENTIAL HYPERTENSION: ICD-10-CM

## 2022-01-18 RX ORDER — AMLODIPINE BESYLATE AND BENAZEPRIL HYDROCHLORIDE 5; 40 MG/1; MG/1
CAPSULE ORAL
Qty: 30 CAPSULE | Refills: 3 | Status: SHIPPED | OUTPATIENT
Start: 2022-01-18 | End: 2022-05-31

## 2022-01-18 NOTE — TELEPHONE ENCOUNTER
Christia Holter has requested a refill of amLODIPine-benazepril (LOTREL) 5-40 mg per capsule  [facility]  Would a refill be appropriate?

## 2022-01-21 ENCOUNTER — TELEPHONE (OUTPATIENT)
Dept: PULMONOLOGY | Facility: CLINIC | Age: 62
End: 2022-01-21

## 2022-01-24 NOTE — PROGRESS NOTES
Nissa Kelsey      Dear Dr Cathi Torres,    Thank you for referring your patient to our pulmonary rehabilitation program  The patient completed the evaluation  However, rehab is being discontinued at this time due to: No phone calls back regarding decision to go forward with pulmonary rehab due to high co-pay/insurance  Please contact us at [phone] if you have any questions about this patents case  Thank you for your continued support of cardiac rehabilitation         Sincerely,      Jeff Knott MS, CEP

## 2022-02-23 DIAGNOSIS — E11.9 TYPE 2 DIABETES MELLITUS WITHOUT COMPLICATION, WITHOUT LONG-TERM CURRENT USE OF INSULIN (HCC): ICD-10-CM

## 2022-02-23 NOTE — TELEPHONE ENCOUNTER
Lacey Rainey has called the [facility] [place] office in regards to Lalito Edwards stated Ana needs a refill of metFORMIN (GLUCOPHAGE-XR) 500 mg 24 hr tablet  Refill to be sent to the [facility] on [address]

## 2022-02-24 RX ORDER — METFORMIN HYDROCHLORIDE 500 MG/1
1000 TABLET, EXTENDED RELEASE ORAL 2 TIMES DAILY WITH MEALS
Qty: 120 TABLET | Refills: 2 | Status: SHIPPED | OUTPATIENT
Start: 2022-02-24 | End: 2022-06-16

## 2022-03-02 ENCOUNTER — HOSPITAL ENCOUNTER (OUTPATIENT)
Dept: SLEEP CENTER | Facility: CLINIC | Age: 62
Discharge: HOME | End: 2022-03-02
Payer: COMMERCIAL

## 2022-03-02 DIAGNOSIS — G47.33 OSA (OBSTRUCTIVE SLEEP APNEA): ICD-10-CM

## 2022-03-02 PROCEDURE — 95811 POLYSOM 6/>YRS CPAP 4/> PARM: CPT | Performed by: INTERNAL MEDICINE

## 2022-03-02 PROCEDURE — 95811 POLYSOM 6/>YRS CPAP 4/> PARM: CPT

## 2022-03-03 NOTE — PROGRESS NOTES
Sleep Study Documentation    Pre-Sleep Study       Sleep testing procedure explained to patient:YES    Patient napped prior to study:NO    Caffeine:Dayshift worker after 12PM   Caffeine use:NO    Alcohol:Dayshift workers after 5PM: Alcohol use:NO    Typical day for patient:YES       Study Documentation    Sleep Study Indications: Requalify for new machine    Sleep Study: Split Optimal PAP pressure: 17/13  Leak:Medium towards end of study because patient was pushing mask into pillow while in the lateral position  Snore:Eliminated  REM Obtained:yes  Supplemental O2: no    Minimum SaO2 65  Baseline SaO2 92  PAP mask tried (list all)Airfit F20  PAP mask choice (final)Airfit F20  PAP mask type:full face due to reports of dry mouth while using nasal mask  PAP pressure at which snoring was eliminated 17/13  Minimum SaO2 at final PAP pressure 89  Mode of Therapy:CPAP  ETCO2:No  CPAP changed to BiPAP:Yes  If yes why Due need to increased pressure    Mode of Therapy:BiPAP    EKG abnormalities: no     EEG abnormalities: no    Sleep Study Recorded < 2 hours: N/A    Sleep Study Recorded > 2 hours but incomplete study: N/A    Sleep Study Recorded 6 hours but no sleep obtained: NO    Patient classification: employed       Post-Sleep Study    Medication used at bedtime or during sleep study:YES over the counter sleep aid    Patient reports time it took to fall asleep:30 to 60 minutes    Patient reports waking up during study:3 or more times  Patient reports returning to sleep in 10 to 30 minutes  Patient reports sleeping 2 to 4 hours without dreaming      Patient reports sleep during study:better than usual    Patient rated sleepiness: Somewhat sleepy or tired    PAP treatment: Yes patient felt unsure and would wear mask at home

## 2022-03-04 ENCOUNTER — TELEPHONE (OUTPATIENT)
Dept: SLEEP CENTER | Facility: CLINIC | Age: 62
End: 2022-03-04

## 2022-03-04 NOTE — TELEPHONE ENCOUNTER
[address] [facility] patient  Left message for patient to call back for sleep study results     Severe ANTONETTE  AHI [phone]  Dr Svetlana Rondon ordered BIPAP

## 2022-03-09 NOTE — TELEPHONE ENCOUNTER
Returned patient's call and reviewed sleep study results  Advised BiPAP ordered  Patient states she already has a machine  She got a replacement from [facility] since he previous machine was recalled  Patient is unsure if it is a BiPAP or a CPAP  Advised patient that a CPAP cannot be set to BiPAP mode  If she has CPAP she would need a new machine  I will send message to the pulmonary office to process order through [facility]  Someone from [facility] should then reach out to her to arrange set up of machine  She can discuss with Wes at that time what type of machine she currently has  Patient agreeable  Also advised that insurance will require follow up with the physician 31-90 days after starting use of machine  I will send message to pulmonary office to reach out to reschedule 4/13/22 appointment with Dr Nico Luna

## 2022-03-09 NOTE — TELEPHONE ENCOUNTER
Orders, insurance card, sleep study office note 9/21/21 faxed over to [address] for processing of the order  Message also sent to provider on [facility] to see if she should still keep her 4/15 appointment as it will be 3-4 months before she gets her machine

## 2022-03-30 DIAGNOSIS — R25.2 MUSCLE CRAMPS: ICD-10-CM

## 2022-03-30 DIAGNOSIS — M79.89 LEG SWELLING: ICD-10-CM

## 2022-03-30 RX ORDER — POTASSIUM CHLORIDE 20 MEQ/1
TABLET, EXTENDED RELEASE ORAL
Qty: 30 TABLET | Refills: 5 | Status: SHIPPED | OUTPATIENT
Start: 2022-03-30

## 2022-03-30 RX ORDER — FUROSEMIDE 20 MG/1
TABLET ORAL
Qty: 30 TABLET | Refills: 0 | Status: SHIPPED | OUTPATIENT
Start: 2022-03-30 | End: 2022-07-05

## 2022-03-30 NOTE — TELEPHONE ENCOUNTER
Neptali Munoz has requested a refill of potassium chloride (K-DUR,KLOR-CON) 20 mEq and furosemide (LASIX) 20 mg tablet  Pt does not meet the requirements placed by [facility]  Would a refill be appropriate?

## 2022-04-27 ENCOUNTER — OFFICE VISIT (OUTPATIENT)
Dept: PULMONOLOGY | Facility: CLINIC | Age: 62
End: 2022-04-27
Payer: COMMERCIAL

## 2022-04-27 VITALS
BODY MASS INDEX: 50.02 KG/M2 | TEMPERATURE: 97.5 F | HEIGHT: 64 IN | OXYGEN SATURATION: 96 % | DIASTOLIC BLOOD PRESSURE: 80 MMHG | HEART RATE: 87 BPM | WEIGHT: 293 LBS | SYSTOLIC BLOOD PRESSURE: 136 MMHG

## 2022-04-27 DIAGNOSIS — G47.33 OSA (OBSTRUCTIVE SLEEP APNEA): Primary | ICD-10-CM

## 2022-04-27 PROCEDURE — 3079F DIAST BP 80-89 MM HG: CPT | Performed by: INTERNAL MEDICINE

## 2022-04-27 PROCEDURE — 3075F SYST BP GE 130 - 139MM HG: CPT | Performed by: INTERNAL MEDICINE

## 2022-04-27 PROCEDURE — 3008F BODY MASS INDEX DOCD: CPT | Performed by: INTERNAL MEDICINE

## 2022-04-27 PROCEDURE — 99215 OFFICE O/P EST HI 40 MIN: CPT | Performed by: INTERNAL MEDICINE

## 2022-04-27 RX ORDER — MULTIVITAMIN
1 TABLET ORAL DAILY
COMMUNITY

## 2022-04-27 NOTE — PROGRESS NOTES
Pulmonary Follow Up Note   Ana Gonzalez 65 y o  female MRN: [id number]  4/27/2022      Assessment/Plan:    ANTONETTE (obstructive sleep apnea)  · Severe sleep apnea, recently had split study which showed residual apnea and hypoxia despite use of CPAP, she was up titrated to BiPAP which she is using now  · She has been using the BiPAP for the past 1 month, she has had some issues with it staying on her face and finding a bit uncomfortable  · We need to review her BiPAP compliance, I do have her previous compliance however this is when she was on CPAP up until March  · A have encouraged her to go to [address] to see if a different size mask would be something that would be helpful  · She should continue using BiPAP for management of her severe sleep apnea  · Encouraged diet/exercise and weight loss as this will help her underlying apnea condition    Morbid obesity ([address] )  · BMI 50, certainly is contributing to her overall breathing and conditioning   Recommend to continue with weight loss efforts as this will help her overall breathing    Health Maintenance  Immunization History   Administered Date(s) Administered    COVID-19 PFIZER VACCINE 0 3 ML IM 03/27/2021, 04/17/2021, 12/11/2021    Influenza, recombinant, quadrivalent,injectable, preservative free 10/15/2021    Pneumococcal Polysaccharide PPV23 02/22/2021    Tdap 09/14/2021       Return in about 3 months (around 7/27/2022)  History of Present Illness   HPI:  Garrison Sosa is a 65 y o  female with a PMHX of DM2, HLD, HTN, ANTONETTE who had COVID in August 2021 and presents today for follow-up  I last saw Ana about three months ago  She did have her sleep study and she has diagnosis of severe sleep apnea and was recommended that she start BiPAP  She did make the switch from her auto CPAP to BiPAP around the end of March  I do have compliance data up until March however do not have her BiPAP compliance data    Overall she says that the she has had some nights where she has not found the BiPAP to be very comfortable and has had to remove it  She has continued to try and be as compliant as possible with the BiPAP  Review of Systems   Constitutional: Negative for activity change, appetite change, chills, fever and unexpected weight change  HENT: Negative for congestion, ear pain, postnasal drip, rhinorrhea, sore throat, trouble swallowing and voice change  Respiratory: Positive for cough and shortness of breath  Negative for chest tightness and wheezing  Cardiovascular: Negative for chest pain and palpitations  Gastrointestinal: Negative for abdominal distention, constipation, diarrhea, nausea and vomiting  Endocrine: Negative for cold intolerance and heat intolerance  Genitourinary: Negative for dysuria, frequency and urgency  Musculoskeletal: Negative for arthralgias, joint swelling and myalgias  Skin: Negative for color change, pallor and rash  Neurological: Negative for dizziness, weakness, numbness and headaches  Psychiatric/Behavioral: Negative for agitation and confusion  The patient is not nervous/anxious          Historical Information   Past Medical History:   Diagnosis Date    Diabetes mellitus ([facility] [address] )     Hypertension     Pneumonia August 2031    Sleep apnea, obstructive      Past Surgical History:   Procedure Laterality Date    BREAST BIOPSY Right 2016    Done at [address] [place] [address] EXTRACTION       Family History   Problem Relation Age of Onset    Heart disease Mother     Diabetes Mother     Liver cancer Mother     Kidney cancer Mother     Cancer Mother     Heart disease Father     Colon cancer Father     Skin cancer Father     Stroke Brother          Meds/Allergies     Current Outpatient Medications:     amLODIPine-benazepril (LOTREL) 5-40 MG per capsule, take 1 capsule by mouth once daily, Disp: 30 capsule, Rfl: 3    atorvastatin (LIPITOR) 40 mg tablet, take 1 tablet by mouth once daily AFTER DINNER, Disp: 30 tablet, Rfl: 3    cholecalciferol (VITAMIN D3) 1,000 units tablet, Take 1,000 Units by mouth daily, Disp: , Rfl:     cholecalciferol (VITAMIN D3) 1,000 units tablet, Take 2 tablets (2,000 Units total) by mouth daily for 7 days, Disp: 14 tablet, Rfl: 0    fluocinolone (SYNALAR) 0 025 % cream, apply to affected area twice a day to THE FACE, Disp: , Rfl:     furosemide (LASIX) 20 mg tablet, take 1 tablet by mouth once daily if needed for SWELLING, Disp: 30 tablet, Rfl: 0    Melatonin 10 MG CAPS, Take 10 mg by mouth daily at bedtime as needed, Disp: , Rfl:     metFORMIN (GLUCOPHAGE-XR) 500 mg 24 hr tablet, Take 2 tablets (1,000 mg total) by mouth 2 (two) times a day with meals, Disp: 120 tablet, Rfl: 2    potassium chloride (K-DUR,KLOR-CON) 20 mEq tablet, take 1 tablet by mouth once daily if needed TAKE ONLY WHEN YOU TAKE LASIX, Disp: 30 tablet, Rfl: 5  No Known Allergies    Vitals: Height 5' 4" (1 626 m), weight (!) 137 kg (301 lb)  Body mass index is 51 67 kg/m²  Physical Exam  Vitals reviewed  Constitutional:       Appearance: Normal appearance  She is not ill-appearing or toxic-appearing  HENT:      Head: Normocephalic and atraumatic  Nose: Nose normal  No congestion or rhinorrhea  Mouth/Throat:      Mouth: Mucous membranes are moist       Pharynx: Oropharynx is clear  Cardiovascular:      Rate and Rhythm: Normal rate and regular rhythm  Pulses: Normal pulses  Heart sounds: Normal heart sounds  Pulmonary:      Effort: Pulmonary effort is normal  No respiratory distress  Breath sounds: Normal breath sounds  No wheezing, rhonchi or rales  Abdominal:      General: Abdomen is flat  Bowel sounds are normal  There is no distension  Palpations: Abdomen is soft  Tenderness: There is no abdominal tenderness  There is no guarding  Musculoskeletal:         General: No swelling or tenderness  Normal range of motion        Cervical back: Normal range of motion and neck supple  Right lower leg: No edema  Left lower leg: No edema  Skin:     General: Skin is warm and dry  Findings: No rash  Neurological:      General: No focal deficit present  Mental Status: She is alert and oriented to person, place, and time  Mental status is at baseline  Psychiatric:         Mood and Affect: Mood normal          Behavior: Behavior normal        Labs: I have personally reviewed pertinent lab results  Lab Results   Component Value Date    WBC 6 54 02/27/2021    HGB 13 7 02/27/2021    HCT 44 3 02/27/2021    MCV 87 02/27/2021     02/27/2021     Lab Results   Component Value Date    CALCIUM 9 2 02/27/2021    K 4 7 02/27/2021    CO2 31 02/27/2021     02/27/2021    BUN 19 02/27/2021    CREATININE 0 89 02/27/2021     No results found for: IGE  Lab Results   Component Value Date    ALT 40 02/27/2021    AST 19 02/27/2021    ALKPHOS 66 02/27/2021     Imaging and other studies: I have personally reviewed pertinent reports  and I have personally reviewed pertinent films in PACS    Pulmonary function testing:   FEV1/FVC Ratio: 86 %  FEV1: 1 92 L     76 % predicted  Forced Vital Capacity: 2 25 L    72 % predicted  After administration of bronchodilator:  No change     Lung volumes: Total Lung Capacity 83 % predicted Residual volume 97 % predicted     DLCO corrected for patients hemoglobin level: 61 % predicted    EKG, Pathology, and Other Studies: I have personally reviewed pertinent reports  and I have personally reviewed pertinent films in PACS      OLGA Ordonez    [facility]    Answers for HPI/ROS submitted by the patient on 4/27/2022  Chronicity: recurrent  When did you first notice your symptoms?: more than 1 month ago  How often do your symptoms occur?: intermittently  Since you first noticed this problem, how has it changed?: waxing and waning  Do you have shortness of breath that occurs with effort or exertion?: Yes  Do you have ear congestion?: No  Do you have heartburn?: No  Do you have fatigue?: Yes  Do you have nasal congestion?: No  Do you have shortness of breath when lying flat?: No  Do you have shortness of breath when you wake up?: No  Do you have sweats?: No  Have you experienced weight loss?: No  Which of the following makes your symptoms worse?: climbing stairs, exercise, strenuous activity

## 2022-05-03 ENCOUNTER — TELEPHONE (OUTPATIENT)
Dept: PULMONOLOGY | Facility: CLINIC | Age: 62
End: 2022-05-03

## 2022-05-03 NOTE — TELEPHONE ENCOUNTER
5/3/22 - Pt said she never heard from anyone re BIPAP results - said MA for Dr Migel Ashley called Fanta Dunlap while she was there making the request  Please advise the pt on the status of this request

## 2022-05-03 NOTE — TELEPHONE ENCOUNTER
Pt  Can reach out to [facility] for an Update as machine are on back order and can take up to 3-4 months to get

## 2022-05-05 NOTE — TELEPHONE ENCOUNTER
5/5/22 - Pt said she already has her BIPAP  She said the MA was calling Haylie Siegel while she was there at her visit on 4/27 for the report on her usage, settings, etc  Said she's been using the BIPAP for a month, and Dr Stephen Cisneros wanted to see what her "numbers" or "readings" are  Pt said she's not sure what changes might be needed - a mask fitting, change in pressure, etc  Pt doesn't know, she just said Dr Stephen Cisneros wanted to see the report, and the pt wants to know the results

## 2022-05-06 DIAGNOSIS — G47.33 OSA (OBSTRUCTIVE SLEEP APNEA): Primary | ICD-10-CM

## 2022-05-06 NOTE — TELEPHONE ENCOUNTER
Stewart Heck, can we please make sure her DME company is aware of the new BiPAP settings for her  I have placed it as a new order

## 2022-05-06 NOTE — TELEPHONE ENCOUNTER
I have reviewed BiPAP compliance, good AHI but pressures are not tolerable for her  I have sent in new BiPAP settings and will await compliance over the next 6-8 weeks

## 2022-05-06 NOTE — TELEPHONE ENCOUNTER
5/6/22 - Pt called again and said she didn't know why she wasn't told previously that Dr Nima Su was not available, but told her a note was sent to Dr Dayan Thornton for his review on Monday  Pt wants a call back asap

## 2022-05-06 NOTE — TELEPHONE ENCOUNTER
Dr Mariana Smith,     Can you please take a look at patient BIPAP report  When she was in last we only had access to her CPAP report and she received her BIPAP on 3/28  Patient sees Dr Mariah Tsai but he is out right now

## 2022-05-09 NOTE — TELEPHONE ENCOUNTER
DME pressure changed submitted through [employer]  Called and spoke to patient and advise her of review a updated pressure

## 2022-05-16 LAB
DME PARACHUTE DELIVERY DATE ACTUAL: NORMAL
DME PARACHUTE DELIVERY DATE REQUESTED: NORMAL
DME PARACHUTE ITEM DESCRIPTION: NORMAL
DME PARACHUTE ORDER STATUS: NORMAL
DME PARACHUTE SUPPLIER NAME: NORMAL
DME PARACHUTE SUPPLIER PHONE: NORMAL

## 2022-06-01 ENCOUNTER — APPOINTMENT (EMERGENCY)
Dept: RADIOLOGY | Facility: HOSPITAL | Age: 62
End: 2022-06-01
Payer: COMMERCIAL

## 2022-06-01 ENCOUNTER — HOSPITAL ENCOUNTER (EMERGENCY)
Facility: HOSPITAL | Age: 62
Discharge: HOME | End: 2022-06-02
Attending: EMERGENCY MEDICINE
Payer: COMMERCIAL

## 2022-06-01 DIAGNOSIS — M54.9 UPPER BACK PAIN: Primary | ICD-10-CM

## 2022-06-01 LAB
ANION GAP SERPL CALCULATED.3IONS-SCNC: 8 MMOL/L (ref 4–13)
BASOPHILS # BLD AUTO: 0.06 THOUSANDS/ΜL (ref 0–0.1)
BASOPHILS NFR BLD AUTO: 1 % (ref 0–1)
BUN SERPL-MCNC: 19 MG/DL (ref 5–25)
CALCIUM SERPL-MCNC: 9.6 MG/DL (ref 8.4–10.2)
CARDIAC TROPONIN I PNL SERPL HS: 2 NG/L (ref 8–18)
CHLORIDE SERPL-SCNC: 101 MMOL/L (ref 96–108)
CO2 SERPL-SCNC: 29 MMOL/L (ref 21–32)
CREAT SERPL-MCNC: 0.88 MG/DL (ref 0.6–1.3)
D DIMER PPP FEU-MCNC: 0.39 UG/ML FEU
EOSINOPHIL # BLD AUTO: 0.1 THOUSAND/ΜL (ref 0–0.61)
EOSINOPHIL NFR BLD AUTO: 1 % (ref 0–6)
ERYTHROCYTE [DISTWIDTH] IN BLOOD BY AUTOMATED COUNT: 15.7 % (ref 11.6–15.1)
GFR SERPL CREATININE-BSD FRML MDRD: 71 ML/MIN/1.73SQ M
GLUCOSE SERPL-MCNC: 167 MG/DL (ref 65–140)
HCT VFR BLD AUTO: 42.4 % (ref 34.8–46.1)
HGB BLD-MCNC: 12.8 G/DL (ref 11.5–15.4)
IMM GRANULOCYTES # BLD AUTO: 0.08 THOUSAND/UL (ref 0–0.2)
IMM GRANULOCYTES NFR BLD AUTO: 1 % (ref 0–2)
LYMPHOCYTES # BLD AUTO: 1.52 THOUSANDS/ΜL (ref 0.6–4.47)
LYMPHOCYTES NFR BLD AUTO: 14 % (ref 14–44)
MCH RBC QN AUTO: 26.1 PG (ref 26.8–34.3)
MCHC RBC AUTO-ENTMCNC: 30.2 G/DL (ref 31.4–37.4)
MCV RBC AUTO: 87 FL (ref 82–98)
MONOCYTES # BLD AUTO: 0.72 THOUSAND/ΜL (ref 0.17–1.22)
MONOCYTES NFR BLD AUTO: 7 % (ref 4–12)
NEUTROPHILS # BLD AUTO: 8.63 THOUSANDS/ΜL (ref 1.85–7.62)
NEUTS SEG NFR BLD AUTO: 76 % (ref 43–75)
NRBC BLD AUTO-RTO: 0 /100 WBCS
PLATELET # BLD AUTO: 270 THOUSANDS/UL (ref 149–390)
PMV BLD AUTO: 10.8 FL (ref 8.9–12.7)
POTASSIUM SERPL-SCNC: 4.5 MMOL/L (ref 3.5–5.3)
RBC # BLD AUTO: 4.9 MILLION/UL (ref 3.81–5.12)
SODIUM SERPL-SCNC: 138 MMOL/L (ref 135–147)
WBC # BLD AUTO: 11.11 THOUSAND/UL (ref 4.31–10.16)

## 2022-06-01 PROCEDURE — 80048 BASIC METABOLIC PNL TOTAL CA: CPT | Performed by: EMERGENCY MEDICINE

## 2022-06-01 PROCEDURE — 84484 ASSAY OF TROPONIN QUANT: CPT | Performed by: EMERGENCY MEDICINE

## 2022-06-01 PROCEDURE — 93005 ELECTROCARDIOGRAM TRACING: CPT

## 2022-06-01 PROCEDURE — 36415 COLL VENOUS BLD VENIPUNCTURE: CPT | Performed by: EMERGENCY MEDICINE

## 2022-06-01 PROCEDURE — 99284 EMERGENCY DEPT VISIT MOD MDM: CPT

## 2022-06-01 PROCEDURE — 71045 X-RAY EXAM CHEST 1 VIEW: CPT

## 2022-06-01 PROCEDURE — 85025 COMPLETE CBC W/AUTO DIFF WBC: CPT | Performed by: EMERGENCY MEDICINE

## 2022-06-01 PROCEDURE — 99291 CRITICAL CARE FIRST HOUR: CPT | Performed by: EMERGENCY MEDICINE

## 2022-06-01 PROCEDURE — 85379 FIBRIN DEGRADATION QUANT: CPT | Performed by: EMERGENCY MEDICINE

## 2022-06-01 RX ORDER — ONDANSETRON 4 MG/1
8 TABLET, ORALLY DISINTEGRATING ORAL ONCE
Status: COMPLETED | OUTPATIENT
Start: 2022-06-01 | End: 2022-06-01

## 2022-06-01 RX ORDER — OXYCODONE HYDROCHLORIDE 5 MG/1
5 TABLET ORAL ONCE
Status: COMPLETED | OUTPATIENT
Start: 2022-06-01 | End: 2022-06-01

## 2022-06-01 RX ADMIN — OXYCODONE HYDROCHLORIDE 5 MG: 5 TABLET ORAL at 22:16

## 2022-06-01 RX ADMIN — ONDANSETRON 8 MG: 4 TABLET, ORALLY DISINTEGRATING ORAL at 22:18

## 2022-06-02 VITALS
OXYGEN SATURATION: 93 % | RESPIRATION RATE: 20 BRPM | HEART RATE: 96 BPM | DIASTOLIC BLOOD PRESSURE: 82 MMHG | SYSTOLIC BLOOD PRESSURE: 137 MMHG | TEMPERATURE: 98.1 F

## 2022-06-02 LAB
ATRIAL RATE: 80 BPM
ATRIAL RATE: 89 BPM
CARDIAC TROPONIN I PNL SERPL HS: 2 NG/L (ref 8–18)
P AXIS: 46 DEGREES
P AXIS: 70 DEGREES
PR INTERVAL: 164 MS
PR INTERVAL: 168 MS
QRS AXIS: 64 DEGREES
QRS AXIS: 72 DEGREES
QRSD INTERVAL: 100 MS
QRSD INTERVAL: 98 MS
QT INTERVAL: 386 MS
QT INTERVAL: 392 MS
QTC INTERVAL: 452 MS
QTC INTERVAL: 469 MS
T WAVE AXIS: 57 DEGREES
T WAVE AXIS: 64 DEGREES
VENTRICULAR RATE: 80 BPM
VENTRICULAR RATE: 89 BPM

## 2022-06-02 PROCEDURE — 93005 ELECTROCARDIOGRAM TRACING: CPT

## 2022-06-02 PROCEDURE — 36415 COLL VENOUS BLD VENIPUNCTURE: CPT | Performed by: EMERGENCY MEDICINE

## 2022-06-02 PROCEDURE — 84484 ASSAY OF TROPONIN QUANT: CPT | Performed by: EMERGENCY MEDICINE

## 2022-06-02 PROCEDURE — 93010 ELECTROCARDIOGRAM REPORT: CPT | Performed by: INTERNAL MEDICINE

## 2022-06-02 RX ORDER — ONDANSETRON 4 MG/1
8 TABLET, ORALLY DISINTEGRATING ORAL EVERY 8 HOURS PRN
Qty: 10 TABLET | Refills: 3 | Status: SHIPPED | OUTPATIENT
Start: 2022-06-02

## 2022-06-02 RX ORDER — OXYCODONE HYDROCHLORIDE 5 MG/1
5 CAPSULE ORAL EVERY 6 HOURS PRN
Qty: 5 CAPSULE | Refills: 0 | Status: SHIPPED | OUTPATIENT
Start: 2022-06-02

## 2022-06-02 NOTE — ED PROCEDURE NOTE
PROCEDURE  ECG 12 Lead Documentation Only    Date/Time: 6/2/2022 12:59 AM  Performed by: Lanie Guaman MD  Authorized by: Lanie Guaman MD     Indications / Diagnosis:  Er ecg #    ECG reviewed by me, the ED Provider: yes    Patient location:  ED  Previous ECG:     Previous ECG:  Compared to current    Comparison ECG info:  Comapred to initial er ecg- no sign changes    Similarity:  No change    Comparison to cardiac monitor: Yes    Interpretation:     Interpretation: non-specific    Rate:     ECG rate:  80    ECG rate assessment: normal    Rhythm:     Rhythm: sinus rhythm    Ectopy:     Ectopy: none    QRS:     QRS axis:  Normal    QRS intervals:  Normal  Conduction:     Conduction: normal    ST segments:     ST segments:  Normal  T waves:     T waves: flattening      Flattening:  AVL and V1  Comments:      No ecg signs of ischemia/ injury / r heart strain / paige/pericarditis          Lanie Guaman MD  06/02/22 8943

## 2022-06-02 NOTE — DISCHARGE INSTRUCTIONS
DIAGNOSIS; UPPER BACK PAIN     -  PLEASE CALL YOUR PRIMARY DOCTOR TOMORROW- TO SCHEDULE AN APPOINTMENT  FOR A RECHECK WITHIN 1 WEEK     - FOR PAIN- CAN TRY  OVER THE COUNTER GENERIC TYLENOL 500 MG EVERY 4 HRS - CAN ALSO TRY OVER THE COUNTER LIDOCAINE PATCHES TO AREAS OF UPPER BACK AS NEEDED     - EVEN THOUGH YOU CAN IN WITH UPPER BACK PAIN- AND NO CHEST PAIN - WE DID A CHEST PAIN WORKUP WHICH WAS NEGATIVE-     AFTER A NEGATIVE ER CHEST PAIN WORKUP APPROXIMATELY 1 OUT  PATIENTS WILL GO ON TO HAVE A HEART ATTACK  WITHIN 1 MONTH     - ONLY AS NEEDED FOR SEVERE PAIN- OXYCODONE- TAKE ONE TABLET AS NEEDED    - FOR ANY NAUSEA- ORAL ZOFRAN 2 TABLETS DISSOLVE IN THE MOUTH  EVERY 6-8 HRS AS NEEDED     - PLEASE RETURN TO  THE ER FOR ANY WORSENING UPPER BACK PAIN- ANY CHEST PAIN- ANY INCREASING DIFFICULTY BREATHING OR ANY NEW/ WORSENING/CONCERNING SYMPTOMS TO YOU

## 2022-06-02 NOTE — ED PROCEDURE NOTE
PROCEDURE  CriticalCare Time  Performed by: Valdemar Caraballo MD  Authorized by: Valdemar Caraballo MD     Critical care provider statement:     Critical care time (minutes):  35    Critical care start time:  6/2/2022 12:30 AM    Critical care end time:  6/2/2022 1:05 AM    Critical care time was exclusive of:  Separately billable procedures and treating other patients and teaching time    Critical care was necessary to treat or prevent imminent or life-threatening deterioration of the following conditions: chest pain adp with repeat cardiac testing and er evaluation     Critical care was time spent personally by me on the following activities:  Examination of patient, evaluation of patient's response to treatment, development of treatment plan with patient or surrogate, obtaining history from patient or surrogate, review of old charts, ordering and review of laboratory studies, ordering and review of radiographic studies, re-evaluation of patient's condition and ordering and performing treatments and interventions    I assumed direction of critical care for this patient from another provider in my specialty: no    Comments:      Pt re-assessments and re-evaluations           Valdemar Caraballo MD  06/02/22 0541

## 2022-06-02 NOTE — ED PROCEDURE NOTE
PROCEDURE  ECG 12 Lead Documentation Only    Date/Time: 6/2/2022 12:56 AM  Performed by: Kristie Suh MD  Authorized by: Kristie Suh MD     Indications / Diagnosis:  Er ecg # 1   ECG reviewed by me, the ED Provider: yes    Patient location:  ED and bedside  Previous ECG:     Previous ECG:  Unavailable    Comparison to cardiac monitor: Yes    Interpretation:     Interpretation: non-specific    Rate:     ECG rate:  89    ECG rate assessment: normal    Rhythm:     Rhythm: sinus rhythm    Ectopy:     Ectopy: none    QRS:     QRS axis:  Normal    QRS intervals:  Normal  Conduction:     Conduction: normal    ST segments:     ST segments:  Normal  T waves:     T waves: flattening      Flattening:  AVL and V1  Comments:      No ecg signs of ischemia/ injury / r heart strain / paige/pericarditis          Kristie Suh MD  06/02/22 8488

## 2022-06-03 ENCOUNTER — TELEMEDICINE (OUTPATIENT)
Dept: INTERNAL MEDICINE CLINIC | Facility: CLINIC | Age: 62
End: 2022-06-03
Payer: COMMERCIAL

## 2022-06-03 ENCOUNTER — OFFICE VISIT (OUTPATIENT)
Dept: INTERNAL MEDICINE CLINIC | Facility: CLINIC | Age: 62
End: 2022-06-03
Payer: COMMERCIAL

## 2022-06-03 VITALS
WEIGHT: 293 LBS | HEIGHT: 64 IN | SYSTOLIC BLOOD PRESSURE: 140 MMHG | OXYGEN SATURATION: 95 % | DIASTOLIC BLOOD PRESSURE: 84 MMHG | TEMPERATURE: 98 F | BODY MASS INDEX: 50.02 KG/M2 | HEART RATE: 93 BPM

## 2022-06-03 DIAGNOSIS — Z11.52 ENCOUNTER FOR SCREENING FOR COVID-19: ICD-10-CM

## 2022-06-03 DIAGNOSIS — R68.83 CHILLS: Primary | ICD-10-CM

## 2022-06-03 PROCEDURE — 99213 OFFICE O/P EST LOW 20 MIN: CPT | Performed by: HOSPITALIST

## 2022-06-03 PROCEDURE — 99212 OFFICE O/P EST SF 10 MIN: CPT | Performed by: HOSPITALIST

## 2022-06-03 PROCEDURE — 87636 SARSCOV2 & INF A&B AMP PRB: CPT | Performed by: INTERNAL MEDICINE

## 2022-06-03 NOTE — PROGRESS NOTES
Virtual Brief Visit    Patient is located in the following state in which I hold an active license [place]      Assessment/Plan:    Problem List Items Addressed This Visit        Other    Chills, diarrhea, fatigue, back pain - Primary     · Noted mid posterior back pain 2 days ago, was in the ER 6/1, EKG,  troponin, BMP, chest x-ray unremarkable  CBC showed leukocytosis  · Was given oxycodone in ED and discharged -- per patient, back pain resolved but thereafter noted onset of chills, nonbloody diarrhea, fatigue  · Etiology unclear at this time  -- cannot rule out infectious cause (bacterial versus viral); limited assessment given the virtual nature of this visit  · Patient coming in this afternoon for an in-person visit               HPI:    Two days ago, noted onset of mid posterior back pain radiating occasionally to anterior chest   Patient went to the ED for further evaluation, workup done with EKG, troponin, BMP, chest x-ray and CBC  Workup unremarkable other than leukocytosis on CBC  Patient afebrile at that time  She was given oxycodone in the ED for her back pain and was subsequently discharged  Thereafter, noted onset of chills, fatigue, nonbloody diarrhea  Per patient, back pain resolved with continued intake of oxycodone that was given in the ED  Recent Visits  No visits were found meeting these conditions  Showing recent visits within past 7 days and meeting all other requirements  Today's Visits  Date Type Provider Dept   06/03/22 Telemedicine Hema Quiñones MD [facility] today's visits and meeting all other requirements  Future Appointments  No visits were found meeting these conditions    Showing future appointments within next 150 days and meeting all other requirements         I spent 15 minutes directly with the patient during this visit

## 2022-06-03 NOTE — PROGRESS NOTES
Assessment/Plan:    Chills, diarrhea, fatigue, back pain  · Patient's symptoms started on 6/1 would upper back pain, was recently evaluated at the ED same day, initial workup at the ED was unrevealing with normal troponins, normal D-dimers, normal chest x-ray, mild leukocytosis 11 1 was noted  · Patient was evaluated today at the office she continued to report chills, couple episodes of loose bowel movement, fatigue, headaches  · Etiology unclear at this time  most likely viral infection but we cannot rule out bacterial infection  · Patient denied any chest pain, shortness breast, dysuria, abdominal pain  · Extensive discussion with the patient about the course of action, we will test her for COVID-19 infection, if negative and patient continued to have symptoms concerning for infection at the point will repeat her CBC, also we can order procalcitonin and urinalysis to rule out any other sources of infection  · Explain to the patient that at any point if she had any concerning symptoms, high-grade fevers, shortness of breath, abdominal pain, or any concerns to contact us or go to the ED for further evaluation  Diagnoses and all orders for this visit:    Chills, diarrhea, fatigue, back pain    Encounter for screening for COVID-19  -     Covid/Flu- Office Collect          Subjective:      Patient ID: Mya Rodrigez is a 65 y o  female  Is being evaluated today for persistent chills, subjective fevers, night sweats, headaches and back pain  Patient's symptoms initially started on June 1st, she was evaluated on the same day at the ED, initial workup was completely unremarkable  Patient continued to report persistent symptoms despite symptomatic management  Continue to reported headaches, chills, subjective fevers and significant fatigue  Patient denied any shortness of breath, chest pain, palpitations, lightheadedness or dizziness  She reported having couple episodes of loose bowel movements  Patient did not get tested for COVID-19 at the ED  The following portions of the patient's history were reviewed and updated as appropriate: allergies, current medications, past family history, past medical history, past social history, past surgical history and problem list     Review of Systems   Constitutional: Positive for activity change, chills, diaphoresis, fatigue and fever  Respiratory: Negative for cough, choking, chest tightness, shortness of breath and wheezing  Cardiovascular: Negative for chest pain, palpitations and leg swelling  Gastrointestinal: Positive for diarrhea  Negative for abdominal pain, blood in stool, constipation, nausea and vomiting  Genitourinary: Negative for dysuria, flank pain, hematuria and urgency  Musculoskeletal: Positive for myalgias  Neurological: Positive for headaches  Negative for dizziness  Psychiatric/Behavioral: Negative for confusion  The patient is not nervous/anxious  Objective:      /84 (BP Location: Left arm, Patient Position: Sitting, Cuff Size: Standard)   Pulse 93   Temp 98 °F (36 7 °C) (Tympanic)   Ht 5' 4" (1 626 m)   Wt 135 kg (296 lb 12 8 oz)   SpO2 95%   BMI 50 95 kg/m²          Physical Exam  Vitals reviewed  Constitutional:       General: She is not in acute distress  Appearance: Normal appearance  She is not ill-appearing  HENT:      Head: Normocephalic and atraumatic  Mouth/Throat:      Mouth: Mucous membranes are moist       Pharynx: No oropharyngeal exudate or posterior oropharyngeal erythema  Eyes:      Conjunctiva/sclera: Conjunctivae normal    Cardiovascular:      Rate and Rhythm: Normal rate and regular rhythm  Pulses: Normal pulses  Heart sounds: Normal heart sounds  No murmur heard  No gallop  Pulmonary:      Effort: Pulmonary effort is normal  No respiratory distress  Breath sounds: Normal breath sounds  No wheezing  Abdominal:      General: There is no distension  Palpations: Abdomen is soft  Tenderness: There is no abdominal tenderness  Musculoskeletal:         General: Tenderness present  Skin:     General: Skin is warm and dry  Coloration: Skin is not pale  Findings: No erythema or rash  Neurological:      Mental Status: She is alert and oriented to person, place, and time     Psychiatric:         Mood and Affect: Mood normal          Behavior: Behavior normal

## 2022-06-03 NOTE — ASSESSMENT & PLAN NOTE
· Patient's symptoms started on 6/1 would upper back pain, was recently evaluated at the ED same day, initial workup at the ED was unrevealing with normal troponins, normal D-dimers, normal chest x-ray, mild leukocytosis 11 1 was noted  · Patient was evaluated today at the office she continued to report chills, couple episodes of loose bowel movement, fatigue, headaches  · Etiology unclear at this time  most likely viral infection but we cannot rule out bacterial infection  · Patient denied any chest pain, shortness breast, dysuria, abdominal pain  · Extensive discussion with the patient about the course of action, we will test her for COVID-19 infection, if negative and patient continued to have symptoms concerning for infection at the point will repeat her CBC, also we can order procalcitonin and urinalysis to rule out any other sources of infection  · Explain to the patient that at any point if she had any concerning symptoms, high-grade fevers, shortness of breath, abdominal pain, or any concerns to contact us or go to the ED for further evaluation

## 2022-06-03 NOTE — ASSESSMENT & PLAN NOTE
· Noted mid posterior back pain 2 days ago, was in the ER 6/1, EKG,  troponin, BMP, chest x-ray unremarkable    CBC showed leukocytosis  · Was given oxycodone in ED and discharged -- per patient, back pain resolved but thereafter noted onset of chills, nonbloody diarrhea, fatigue  · Etiology unclear at this time  -- cannot rule out infectious cause (bacterial versus viral); limited assessment given the virtual nature of this visit  · Patient coming in this afternoon for an in-person visit

## 2022-06-04 LAB
FLUAV RNA RESP QL NAA+PROBE: NEGATIVE
FLUBV RNA RESP QL NAA+PROBE: NEGATIVE
SARS-COV-2 RNA RESP QL NAA+PROBE: NEGATIVE

## 2022-06-08 NOTE — ED PROVIDER NOTES
History  Chief Complaint   Patient presents with    Back Pain     Pt arrives ems after c/o upper back pain and "just feeling off" starting around 7pm  Does not radiate   gave one of his prescribed nitro tabs at home  65 yr female c/o onset approx 2 hrs ago with mid back pain -- no injury -- no abrupt onset of ripping/ tearing migratory chest pain radiating to back -- no fever/uti/cough/sob-  States does not feel right-- but no other specific comps- no recent [place] type cp- n no abd pain n/v/d- melena/progressive dysphagia- no bue/ble weakness       History provided by:  Patient  Back Pain  Location:  Thoracic spine  Quality:  Aching      Prior to Admission Medications   Prescriptions Last Dose Informant Patient Reported? Taking?    Melatonin 10 MG CAPS  Self Yes No   Sig: Take 10 mg by mouth daily at bedtime as needed   Multiple Vitamin (multivitamin) tablet  Self Yes No   Sig: Take 1 tablet by mouth daily   amLODIPine-benazepril (LOTREL) 5-40 MG per capsule  Self No No   Sig: take 1 capsule by mouth once daily   atorvastatin (LIPITOR) 40 mg tablet  Self No No   Sig: take 1 tablet by mouth once daily AFTER DINNER   cholecalciferol (VITAMIN D3) 1,000 units tablet  Self Yes No   Sig: Take 1,000 Units by mouth daily   Patient not taking: No sig reported   cholecalciferol (VITAMIN D3) 1,000 units tablet   No No   Sig: Take 2 tablets (2,000 Units total) by mouth daily for 7 days   fluocinolone (SYNALAR) 0 025 % cream  Self Yes No   Sig: apply to affected area twice a day to [address]   Patient not taking: No sig reported   furosemide (LASIX) 20 mg tablet  Self No No   Sig: take 1 tablet by mouth once daily if needed for SWELLING   metFORMIN (GLUCOPHAGE-XR) 500 mg 24 hr tablet   No No   Sig: Take 2 tablets (1,000 mg total) by mouth 2 (two) times a day with meals   potassium chloride (K-DUR,KLOR-CON) 20 mEq tablet  Self No No   Sig: take 1 tablet by mouth once daily if needed TAKE ONLY WHEN YOU TAKE LASIX Facility-Administered Medications: None       Past Medical History:   Diagnosis Date    Diabetes mellitus ([address] )     Hypertension     Pneumonia 2031    Sleep apnea, obstructive        Past Surgical History:   Procedure Laterality Date    BREAST BIOPSY Right 2016    Done at [address] [place] [address] EXTRACTION         Family History   Problem Relation Age of Onset    Heart disease Mother     Diabetes Mother     Liver cancer Mother     Kidney cancer Mother     Cancer Mother     Heart disease Father     Colon cancer Father     Skin cancer Father     Stroke Brother      I have reviewed and agree with the history as documented  E-Cigarette/Vaping    E-Cigarette Use Never User      E-Cigarette/Vaping Substances    Nicotine No     THC No     CBD No     Flavoring No     Other No     Unknown No      Social History     Tobacco Use    Smoking status: Former Smoker     Packs/day: 1 00     Years: 30 00     Pack years: 30 00     Types: Cigarettes     Quit date:      Years since quittin 4    Smokeless tobacco: Never Used   Vaping Use    Vaping Use: Never used   Substance Use Topics    Alcohol use: Not Currently     Alcohol/week: 0 0 standard drinks    Drug use: Not Currently     Types: Marijuana     Comment: Has not used in a long time(edible)       Review of Systems   Constitutional: Negative  HENT: Negative  Eyes: Negative  Respiratory: Negative  Cardiovascular: Negative  Gastrointestinal: Negative  Endocrine: Negative  Genitourinary: Negative  Musculoskeletal: Positive for back pain  Negative for arthralgias, gait problem, joint swelling, myalgias, neck pain and neck stiffness  Skin: Negative  Allergic/Immunologic: Negative  Neurological: Negative  Hematological: Negative  Psychiatric/Behavioral: Negative  Physical Exam  Physical Exam  Vitals and nursing note reviewed     Constitutional:       General: She is not in acute distress  Appearance: Normal appearance  She is not ill-appearing, toxic-appearing or diaphoretic  Comments: avss- htnsive which improved in the er -- pulse ox 95 % on ra- interpretation is normal- no intervention - non marfanoid body habitus    HENT:      Head: Normocephalic and atraumatic  No Artis's sign  Nose: Nose normal       Mouth/Throat:      Mouth: Mucous membranes are moist    Eyes:      General: No scleral icterus  Right eye: No discharge  Left eye: No discharge  Extraocular Movements: Extraocular movements intact  Conjunctiva/sclera: Conjunctivae normal       Pupils: Pupils are equal, round, and reactive to light  Comments: Mm pink    Neck:      Vascular: No carotid bruit  Comments: No pmt c/t/l/s spine   Cardiovascular:      Rate and Rhythm: Normal rate and regular rhythm  Pulses: Normal pulses  Heart sounds: Normal heart sounds  No murmur heard  No friction rub  No gallop  Pulmonary:      Effort: Pulmonary effort is normal  No respiratory distress  Breath sounds: Normal breath sounds  No stridor  No wheezing, rhonchi or rales  Chest:      Chest wall: No tenderness  Abdominal:      General: Bowel sounds are normal  There is no distension  Palpations: Abdomen is soft  There is no mass  Tenderness: There is no abdominal tenderness  There is no right CVA tenderness, left CVA tenderness, guarding or rebound  Hernia: No hernia is present  Comments: Soft nt/nd- no hsm - no ascites- no cva tenderness- no peritoneal signs- no pulsatile abd mass/bruit/ tenderness   Musculoskeletal:         General: Tenderness present  No swelling, deformity or signs of injury  Normal range of motion  Cervical back: Normal range of motion and neck supple  No rigidity or tenderness  Right lower leg: Edema present  Left lower leg: Edema present        Comments: Equal bilateral radial/dp pulses- trace ble pretibial edema//calf tenderness/asym/ erythema    - mid back pain made worse upon active rom by pt lying flat to sitting up    Lymphadenopathy:      Cervical: No cervical adenopathy  Skin:     General: Skin is warm  Capillary Refill: Capillary refill takes less than 2 seconds  Coloration: Skin is not jaundiced or pale  Findings: No bruising, erythema, lesion or rash  Neurological:      General: No focal deficit present  Mental Status: She is alert and oriented to person, place, and time  Mental status is at baseline  Cranial Nerves: No cranial nerve deficit  Sensory: No sensory deficit  Motor: No weakness  Coordination: Coordination normal       Gait: Gait normal       Comments: Normal non focal neuro exam    Psychiatric:         Mood and Affect: Mood normal          Behavior: Behavior normal          Thought Content:  Thought content normal          Judgment: Judgment normal          Vital Signs  ED Triage Vitals   Temperature Pulse Respirations Blood Pressure SpO2   06/01/22 2113 06/01/22 2111 06/01/22 2111 06/01/22 2111 06/01/22 2111   98 1 °F (36 7 °C) 70 20 (!) 172/79 95 %      Temp Source Heart Rate Source Patient Position - Orthostatic VS BP Location FiO2 (%)   06/01/22 2113 06/01/22 2111 06/01/22 2111 06/01/22 2111 --   Oral Monitor Lying Right arm       Pain Score       06/01/22 2111       8           Vitals:    06/01/22 2111 06/01/22 2300 06/01/22 2322 06/02/22 0115   BP: (!) 172/79  137/82    Pulse: 70 78 87 96   Patient Position - Orthostatic VS: Lying  Lying          Visual Acuity  Visual Acuity    Flowsheet Row Most Recent Value   L Pupil Size (mm) 3   R Pupil Size (mm) 3          ED Medications  Medications   oxyCODONE (ROXICODONE) IR tablet 5 mg (5 mg Oral Given 6/1/22 2216)   ondansetron (ZOFRAN-ODT) dispersible tablet 8 mg (8 mg Oral Given 6/1/22 2218)       Diagnostic Studies  Results Reviewed     Procedure Component Value Units Date/Time    High Sensitivity Troponin I Random [[id number]]  (Abnormal) Collected: 06/02/22 0024    Lab Status: Final result Specimen: Blood from Arm, Left Updated: 06/02/22 0109     HS TnI random 2 ng/L     High Sensitivity Troponin I Random [[id number]]  (Abnormal) Collected: 06/01/22 2211    Lab Status: Final result Specimen: Blood from Arm, Left Updated: 06/01/22 2241     HS TnI random 2 ng/L     Basic metabolic panel [[id number]]  (Abnormal) Collected: 06/01/22 2211    Lab Status: Final result Specimen: Blood from Arm, Left Updated: 06/01/22 2233     Sodium 138 mmol/L      Potassium 4 5 mmol/L      Chloride 101 mmol/L      CO2 29 mmol/L      ANION GAP 8 mmol/L      BUN 19 mg/dL      Creatinine 0 88 mg/dL      Glucose 167 mg/dL      Calcium 9 6 mg/dL      eGFR 71 ml/min/1 73sq m     Narrative:      [place] guidelines for Chronic Kidney Disease (CKD):     Stage 1 with normal or high GFR (GFR > 90 mL/min/1 73 square meters)    Stage 2 Mild CKD (GFR = 60-89 mL/min/1 73 square meters)    Stage 3A Moderate CKD (GFR = 45-59 mL/min/1 73 square meters)    Stage 3B Moderate CKD (GFR = 30-44 mL/min/1 73 square meters)    Stage 4 Severe CKD (GFR = 15-29 mL/min/1 73 square meters)    Stage 5 End Stage CKD (GFR <15 mL/min/1 73 square meters)  Note: GFR calculation is accurate only with a steady state creatinine    D-Dimer [[id number]]  (Normal) Collected: 06/01/22 2211    Lab Status: Final result Specimen: Blood from Arm, Left Updated: 06/01/22 2230     D-Dimer, Quant 0 39 ug/ml FEU     Narrative: In the evaluation for possible pulmonary embolism, in the appropriate (Well's Score of 4 or less) patient, the age adjusted d-dimer cutoff for this patient can be calculated as:    Age x 0 01 (in ug/mL) for Age-adjusted D-dimer exclusion threshold for a patient over 50 years      CBC and differential [[id number]]  (Abnormal) Collected: 06/01/22 2211    Lab Status: Final result Specimen: Blood from Arm, Left Updated: 06/01/22 2224     WBC 11 11 Thousand/uL      RBC 4 90 Million/uL      Hemoglobin 12 8 g/dL      Hematocrit 42 4 %      MCV 87 fL      MCH 26 1 pg      MCHC 30 2 g/dL      RDW 15 7 %      MPV 10 8 fL      Platelets 656 Thousands/uL      nRBC 0 /100 WBCs      Neutrophils Relative 76 %      Immat GRANS % 1 %      Lymphocytes Relative 14 %      Monocytes Relative 7 %      Eosinophils Relative 1 %      Basophils Relative 1 %      Neutrophils Absolute 8 63 Thousands/µL      Immature Grans Absolute 0 08 Thousand/uL      Lymphocytes Absolute 1 52 Thousands/µL      Monocytes Absolute 0 72 Thousand/µL      Eosinophils Absolute 0 10 Thousand/µL      Basophils Absolute 0 06 Thousands/µL                  XR chest 1 view portable   Final Result by Agueda Burgos MD (06/02 0920)      No acute cardiopulmonary disease                    Workstation performed: [id number]                    Procedures  Procedures         ED Course  ED Course as of 06/08/22 1454   u Jun 02, 2022   0015 ER MD MEDICAL decision making note-  pt is low risk for pe by well' score- score of 0- fails  perc by age only will check d dimer and use peg ed study d dimer cutoff of 1000 for low risk pt's- er md clinical suspicion of   vte is low   56 Er md note- normal /equal bue pulse ox pleth 95 % on ra- with 3 dots    0053 Cxr portable- no old cxr for comparison -- elevated r hd-- no free/sq air- normal cardiac silhouette- no infiltrate/ ptx/ pulm edema/ pleural effusions   0104 Er md note- pt- re-evaluated by er md- feels improved with pain in upper back- await repeat hs trop testing- pt aware of eng workup at present    0148 ER MD MEDICAL DECISION MAKING NOTE-  BASED ON H AND P AND REPEAT TESTING- ER MD CLINICAL SUSPICION OF ACS/ SCAD/ VTE / TAD IS LOW-              HEART Risk Score    Flowsheet Row Most Recent Value   Heart Score Risk Calculator    History 0 Filed at: 06/02/2022 0143   ECG 0 Filed at: 06/02/2022 0143   Age 1 Filed at: 06/02/2022 0143   Risk Factors 2 Filed at: 06/02/2022 0143   Troponin 0 Filed at: 06/02/2022 0143   HEART Score 3 Filed at: 06/02/2022 0143                        SBIRT 20yo+    Flowsheet Row Most Recent Value   SBIRT (25 yo +)    In order to provide better care to our patients, we are screening all of our patients for alcohol and drug use  Would it be okay to ask you these screening questions? Yes Filed at: 06/01/2022 2327   Initial Alcohol Screen: US AUDIT-C     1  How often do you have a drink containing alcohol? 1 Filed at: 06/01/2022 2327   2  How many drinks containing alcohol do you have on a typical day you are drinking? 0 Filed at: 06/01/2022 2327   3b  FEMALE Any Age, or MALE 65+: How often do you have 4 or more drinks on one occassion? 0 Filed at: 06/01/2022 2327   Audit-C Score 1 Filed at: 06/01/2022 2327   RADHA: How many times in the past year have you    Used an illegal drug or used a prescription medication for non-medical reasons? Never Filed at: 06/01/2022 2327                    MDM    Disposition  Final diagnoses:   Upper back pain     Time reflects when diagnosis was documented in both MDM as applicable and the Disposition within this note     Time User Action Codes Description Comment    6/2/2022 12:29 AM Naida HEAD Add [R07 9] Chest pain, unspecified type     6/2/2022  1:48 AM Joy Glatter Remove [R07 9] Chest pain, unspecified type     6/2/2022  1:48 AM Joy Glatter Add [M54 9] Upper back pain       ED Disposition     ED Disposition   Discharge    Condition   Stable    Date/Time   Thu Jun 2, 2022 [facility] discharge to home/self care                 Follow-up Information    None         Discharge Medication List as of 6/2/2022  1:57 AM      START taking these medications    Details   ondansetron (Zofran ODT) 4 mg disintegrating tablet Take 2 tablets (8 mg total) by mouth every 8 (eight) hours as needed for nausea or vomiting for up to 10 doses, Starting Thu 6/2/2022, Print oxyCODONE (OXY-IR) 5 MG capsule Take 1 capsule (5 mg total) by mouth every 6 (six) hours as needed for severe pain for up to 5 doses CAN SUBSTITUTE OXYCODONE TABLETS FOR CAPSULES TAKE AS NEEDED FOR SEVERE PAIN ONLY Max Daily Amount: 20 mg, Starting Thu 6/2/2022, Normal         CONTINUE these medications which have NOT CHANGED    Details   amLODIPine-benazepril (LOTREL) 5-40 MG per capsule take 1 capsule by mouth once daily, Normal      atorvastatin (LIPITOR) 40 mg tablet take 1 tablet by mouth once daily AFTER DINNER, Normal      cholecalciferol (VITAMIN D3) 1,000 units tablet Take 1,000 Units by mouth daily, Historical Med      fluocinolone (SYNALAR) 0 025 % cream apply to affected area twice a day to THE FACE, Historical Med      furosemide (LASIX) 20 mg tablet take 1 tablet by mouth once daily if needed for SWELLING, Normal      Melatonin 10 MG CAPS Take 10 mg by mouth daily at bedtime as needed, Historical Med      metFORMIN (GLUCOPHAGE-XR) 500 mg 24 hr tablet Take 2 tablets (1,000 mg total) by mouth 2 (two) times a day with meals, Starting u 2/24/2022, Until Sat 3/26/2022, Normal      Multiple Vitamin (multivitamin) tablet Take 1 tablet by mouth daily, Historical Med      potassium chloride (K-DUR,KLOR-CON) 20 mEq tablet take 1 tablet by mouth once daily if needed TAKE ONLY WHEN YOU TAKE LASIX, Normal             No discharge procedures on file      PDMP Review     None          ED Provider  Electronically Signed by           Arleen Herman MD  06/08/22 9620

## 2022-06-11 DIAGNOSIS — E11.9 TYPE 2 DIABETES MELLITUS WITHOUT COMPLICATION, WITHOUT LONG-TERM CURRENT USE OF INSULIN (HCC): ICD-10-CM

## 2022-06-14 ENCOUNTER — OFFICE VISIT (OUTPATIENT)
Dept: PULMONOLOGY | Facility: CLINIC | Age: 62
End: 2022-06-14
Payer: COMMERCIAL

## 2022-06-14 VITALS
WEIGHT: 291 LBS | HEART RATE: 86 BPM | SYSTOLIC BLOOD PRESSURE: 130 MMHG | DIASTOLIC BLOOD PRESSURE: 82 MMHG | HEIGHT: 64 IN | BODY MASS INDEX: 49.68 KG/M2 | TEMPERATURE: 97.5 F | OXYGEN SATURATION: 95 %

## 2022-06-14 DIAGNOSIS — E66.01 MORBID OBESITY (HCC): Primary | ICD-10-CM

## 2022-06-14 PROCEDURE — 99214 OFFICE O/P EST MOD 30 MIN: CPT | Performed by: INTERNAL MEDICINE

## 2022-06-14 PROCEDURE — 1036F TOBACCO NON-USER: CPT | Performed by: INTERNAL MEDICINE

## 2022-06-14 PROCEDURE — 3079F DIAST BP 80-89 MM HG: CPT | Performed by: INTERNAL MEDICINE

## 2022-06-14 PROCEDURE — 3075F SYST BP GE 130 - 139MM HG: CPT | Performed by: INTERNAL MEDICINE

## 2022-06-14 PROCEDURE — 3008F BODY MASS INDEX DOCD: CPT | Performed by: INTERNAL MEDICINE

## 2022-06-15 PROBLEM — G47.33 OSA (OBSTRUCTIVE SLEEP APNEA): Status: RESOLVED | Noted: 2021-09-21 | Resolved: 2022-06-15

## 2022-06-15 NOTE — PROGRESS NOTES
Pulmonary Follow Up Note   Ana Gonzalez 65 y o  female MRN: [id number]  6/15/2022      Assessment/Plan:    ANTONETTE (obstructive sleep apnea)  · Severe sleep apnea, recently had split study which showed residual apnea and hypoxia despite use of CPAP, she was up titrated to BiPAP which she is using now  · She has been using the BiPAP for the past 1 month, she has had some issues with it staying on her face and finding a bit uncomfortable  · BiPAP compliance reviewed, residual AHI 0 7  · She has been having issues of the sensation of losing suction on her BiPAP mask, will discuss with sleep team  · Encouraged diet/exercise and weight loss as this will help her underlying apnea condition     Morbid obesity ([address] )  · BMI 50, certainly is contributing to her overall breathing and conditioning   Recommend to continue with weight loss efforts as this will help her overall breathing       Health Maintenance  Immunization History   Administered Date(s) Administered    COVID-19 PFIZER VACCINE 0 3 ML IM 03/27/2021, 04/17/2021, 12/11/2021    Influenza, recombinant, quadrivalent,injectable, preservative free 10/15/2021    Pneumococcal Polysaccharide PPV23 02/22/2021    Tdap 09/14/2021     Return in about 3 months (around 9/14/2022)  History of Present Illness   HPI:  Vona Leyden is a 65 y o  female with a PMHX of DM2, HLD, HTN, ANTONETTE who had COVID in August 2021 and presents today for follow-up  I last saw Ana about three months ago  She did have her sleep study and she has diagnosis of severe sleep apnea and was recommended that she start BiPAP  She did make the switch from her auto CPAP to BiPAP around the end of March  issues overnight  S she has been using her BiPAP with improved compliance and a residual AHI of 0 7    Overall she has had some issues with the feeling of losing suction on her BiPAP mask which has caused her some    Review of Systems   Constitutional: Negative for activity change, chills, fever and unexpected weight change  HENT: Negative for congestion, rhinorrhea and voice change  Respiratory: Negative for cough, chest tightness, shortness of breath and wheezing  Cardiovascular: Negative for chest pain and palpitations  Gastrointestinal: Negative for abdominal distention, constipation, diarrhea, nausea and vomiting  Endocrine: Negative for cold intolerance and heat intolerance  Genitourinary: Negative for dysuria, frequency and urgency  Musculoskeletal: Negative for arthralgias, joint swelling and myalgias  Skin: Negative for color change, pallor and rash  Neurological: Negative for dizziness, weakness and numbness  Psychiatric/Behavioral: Negative for agitation and confusion  The patient is not nervous/anxious          Historical Information   Past Medical History:   Diagnosis Date    Diabetes mellitus ([facility] [address] )     Hypertension     Pneumonia August 2031    Sleep apnea, obstructive      Past Surgical History:   Procedure Laterality Date    BREAST BIOPSY Right 2016    Done at [address] [place] [address] EXTRACTION       Family History   Problem Relation Age of Onset    Heart disease Mother     Diabetes Mother     Liver cancer Mother     Kidney cancer Mother     Cancer Mother     Heart disease Father     Colon cancer Father     Skin cancer Father     Stroke Brother          Meds/Allergies     Current Outpatient Medications:     amLODIPine-benazepril (LOTREL) 5-40 MG per capsule, take 1 capsule by mouth once daily, Disp: 30 capsule, Rfl: 0    atorvastatin (LIPITOR) 40 mg tablet, take 1 tablet by mouth once daily AFTER DINNER, Disp: 30 tablet, Rfl: 3    furosemide (LASIX) 20 mg tablet, take 1 tablet by mouth once daily if needed for SWELLING, Disp: 30 tablet, Rfl: 0    Melatonin 10 MG CAPS, Take 10 mg by mouth daily at bedtime as needed, Disp: , Rfl:     Multiple Vitamin (multivitamin) tablet, Take 1 tablet by mouth daily, Disp: , Rfl:     potassium chloride (K-DUR,KLOR-CON) 20 mEq tablet, take 1 tablet by mouth once daily if needed TAKE ONLY WHEN YOU TAKE LASIX, Disp: 30 tablet, Rfl: 5    cholecalciferol (VITAMIN D3) 1,000 units tablet, Take 1,000 Units by mouth daily (Patient not taking: No sig reported), Disp: , Rfl:     cholecalciferol (VITAMIN D3) 1,000 units tablet, Take 2 tablets (2,000 Units total) by mouth daily for 7 days, Disp: 14 tablet, Rfl: 0    fluocinolone (SYNALAR) 0 025 % cream, apply to affected area twice a day to [address] (Patient not taking: No sig reported), Disp: , Rfl:     metFORMIN (GLUCOPHAGE-XR) 500 mg 24 hr tablet, Take 2 tablets (1,000 mg total) by mouth 2 (two) times a day with meals, Disp: 120 tablet, Rfl: 2    ondansetron (Zofran ODT) 4 mg disintegrating tablet, Take 2 tablets (8 mg total) by mouth every 8 (eight) hours as needed for nausea or vomiting for up to 10 doses (Patient not taking: Reported on 6/14/2022), Disp: 10 tablet, Rfl: 3    oxyCODONE (OXY-IR) 5 MG capsule, Take 1 capsule (5 mg total) by mouth every 6 (six) hours as needed for severe pain for up to 5 doses CAN SUBSTITUTE OXYCODONE TABLETS FOR CAPSULES TAKE AS NEEDED FOR SEVERE PAIN ONLY Max Daily Amount: 20 mg (Patient not taking: No sig reported), Disp: 5 capsule, Rfl: 0  No Known Allergies    Vitals: Blood pressure 130/82, pulse 86, temperature 97 5 °F (36 4 °C), temperature source Tympanic, height 5' 4" (1 626 m), weight 132 kg (291 lb), SpO2 95 %  Body mass index is 49 95 kg/m²  Oxygen Therapy  SpO2: 95 %    Physical Exam  Vitals reviewed  Constitutional:       Appearance: Normal appearance  She is not ill-appearing or toxic-appearing  HENT:      Head: Normocephalic and atraumatic  Nose: Nose normal  No congestion or rhinorrhea  Mouth/Throat:      Mouth: Mucous membranes are moist       Pharynx: Oropharynx is clear  Cardiovascular:      Rate and Rhythm: Normal rate and regular rhythm  Pulses: Normal pulses        Heart sounds: Normal heart sounds  Pulmonary:      Effort: Pulmonary effort is normal  No respiratory distress  Breath sounds: Normal breath sounds  No wheezing, rhonchi or rales  Abdominal:      General: Abdomen is flat  Bowel sounds are normal  There is no distension  Palpations: Abdomen is soft  Tenderness: There is no abdominal tenderness  There is no guarding  Musculoskeletal:         General: No swelling or tenderness  Normal range of motion  Cervical back: Normal range of motion and neck supple  Right lower leg: No edema  Left lower leg: No edema  Skin:     General: Skin is warm and dry  Findings: No rash  Neurological:      General: No focal deficit present  Mental Status: She is alert and oriented to person, place, and time  Mental status is at baseline  Psychiatric:         Mood and Affect: Mood normal          Behavior: Behavior normal          Labs: I have personally reviewed pertinent lab results  Lab Results   Component Value Date    WBC 11 11 (H) 06/01/2022    HGB 12 8 06/01/2022    HCT 42 4 06/01/2022    MCV 87 06/01/2022     06/01/2022     Lab Results   Component Value Date    CALCIUM 9 6 06/01/2022    K 4 5 06/01/2022    CO2 29 06/01/2022     06/01/2022    BUN 19 06/01/2022    CREATININE 0 88 06/01/2022     No results found for: IGE  Lab Results   Component Value Date    ALT 40 02/27/2021    AST 19 02/27/2021    ALKPHOS 66 02/27/2021       Imaging and other studies: I have personally reviewed pertinent reports  and I have personally reviewed pertinent films in PACS     Pulmonary function testing:   FEV1/FVC Ratio: 86 %  FEV1: 1 92 L     76 % predicted  Forced Vital Capacity: 2 25 L    72 % predicted  After administration of bronchodilator:  No change     Lung volumes:  Total Lung Capacity 83 % predicted Residual volume 97 % predicted     DLCO corrected for patients hemoglobin level: 61 % predicted     EKG, Pathology, and Other Studies: I have personally reviewed pertinent reports  and I have personally reviewed pertinent films in PACS    [facility], [facility]

## 2022-06-16 ENCOUNTER — TELEPHONE (OUTPATIENT)
Dept: INTERNAL MEDICINE CLINIC | Facility: CLINIC | Age: 62
End: 2022-06-16

## 2022-06-16 RX ORDER — METFORMIN HYDROCHLORIDE 500 MG/1
TABLET, EXTENDED RELEASE ORAL
Qty: 120 TABLET | Refills: 2 | Status: SHIPPED | OUTPATIENT
Start: 2022-06-16

## 2022-06-22 DIAGNOSIS — E11.9 TYPE 2 DIABETES MELLITUS WITHOUT COMPLICATION, WITHOUT LONG-TERM CURRENT USE OF INSULIN (HCC): ICD-10-CM

## 2022-06-23 RX ORDER — ATORVASTATIN CALCIUM 40 MG/1
TABLET, FILM COATED ORAL
Qty: 30 TABLET | Refills: 3 | Status: SHIPPED | OUTPATIENT
Start: 2022-06-23

## 2022-06-23 NOTE — TELEPHONE ENCOUNTER
Jerman Amezcua has requested a refill of atorvastatin (LIPITOR) 40 mg tablet  Pt [facility]  Would a refill be appropriate?

## 2022-06-24 ENCOUNTER — TELEPHONE (OUTPATIENT)
Dept: PULMONOLOGY | Facility: CLINIC | Age: 62
End: 2022-06-24

## 2022-06-24 DIAGNOSIS — G47.33 OSA (OBSTRUCTIVE SLEEP APNEA): Primary | ICD-10-CM

## 2022-06-24 NOTE — TELEPHONE ENCOUNTER
----- Message from Esha Smith sent at 5/9/2022  8:16 AM EDT -----  Regarding: compliance needed  Pull compliance and send to Dr Gentry Orta in 6-8 weeks around end of June

## 2022-06-24 NOTE — TELEPHONE ENCOUNTER
----- Message from Armando Freeman MD sent at 6/24/2022  3:12 PM EDT -----  Hi,    I have placed an order for BIPAP pressure changes for Ana, can we please forward to her DME to get these done  Thank you!   Stacie Feliciano

## 2022-06-24 NOTE — TELEPHONE ENCOUNTER
Dr Shannen Woody,     Please review compliance attached to chart  PHYSICAL EXAM:   General: well-appearing man  who appears stated age, in no acute distress  HEENT: normocephalic, atraumatic, no conjunctival erythema,   Cardiovascular: normal rate and rhythm, + S1/S2,   Respiratory: clear to auscultation bilaterally, good aeration bilaterally, in no respiratory distress  Abdominal: soft, nontender, nondistended, no rebound, guarding or rigidity, no peritoneal signs   Extremities: no LE edema b/l. Radial pulses equal and strong b/l  Neuro: No focal motor deficits, orientation and sensation unable to be assessed 2/2 nonverbal etiology  Skin: appropriate color, warm, dry  -China Suarez PGY-1

## 2022-06-29 ENCOUNTER — TELEPHONE (OUTPATIENT)
Dept: DIABETES SERVICES | Facility: CLINIC | Age: 62
End: 2022-06-29

## 2022-06-29 NOTE — TELEPHONE ENCOUNTER
Pt cx'd 7/1/22 appt via [employer]  Called patient  She does want to reschedule   Put patient on recall list for [facility]

## 2022-07-13 ENCOUNTER — OFFICE VISIT (OUTPATIENT)
Dept: DIABETES SERVICES | Facility: CLINIC | Age: 62
End: 2022-07-13
Payer: COMMERCIAL

## 2022-07-13 VITALS — HEIGHT: 64 IN | WEIGHT: 293 LBS | BODY MASS INDEX: 50.02 KG/M2

## 2022-07-13 DIAGNOSIS — E11.9 TYPE 2 DIABETES MELLITUS WITHOUT COMPLICATION, WITHOUT LONG-TERM CURRENT USE OF INSULIN (HCC): ICD-10-CM

## 2022-07-13 PROCEDURE — 97802 MEDICAL NUTRITION INDIV IN: CPT | Performed by: DIETITIAN, REGISTERED

## 2022-07-13 NOTE — PROGRESS NOTES
Medical Nutrition Therapy        Assessment    Visit Type: Initial visit    Chief complaint T2DM    HPI:  52-year-old female being seen for initial medical nutrition therapy for type 2 diabetes  Most recent HbA1c 8 1%  Anas diet history reveals inconsistent carbohydrate intake  Currently meals range from 0 to 45 grams of carbohydrate  Explained basic pathophysiology of diabetes and impact of diet on blood glucose levels  Together we discussed what foods contain CHO, reading a food label, timing of meals and snacks, serving sizes, the role of fiber, and the importance of consistent carbohydrate intake in the appropriate amounts  Used the portion booklet to teach Aan more about food groups and basic carbohydrate counting  Created an individualized meal plan for Ana with 3 meals and 2 snacks providing 30-45 g carb per meal and 0-15  g carb per snack  Ana demonstrated good understanding and will call with any questions prior to follow-up appointment in 6 weeks  Ht Readings from Last 1 Encounters:   07/13/22 5' 4" (1 626 m)     Wt Readings from Last 2 Encounters:   07/13/22 134 kg (296 lb 6 4 oz)   06/14/22 132 kg (291 lb)     Weight Change: Yes gained at least 40-60 lbs since onset of the pandemic    Medical Diagnosis/ICD10: E11 9 (ICD-10-CM) - Type 2 diabetes mellitus without complication, without long-term current use of insulin (HCC)    Barriers to Learning: no barriers    Do you follow any special diet presently?: No, but is trying to do low carb  Who shops: patient  Who cooks: patient    Food Log: Completed via the method of food recall    Breakfast:wakes 7 am  Drinks coffee (with 2% milk) a couple days per week but not every day   Eats around 10 am  Parfait (1/2 cup yogurt, 2/3 cup berries,1/4 cup granola  Morning Snack:sometimes around 11:30 am  - sugar free cookies (3-6 sugar free wafers) or 1 cupfruit ( watermelon, melons, pineapple OR a banana)  Lunch:1 pm leftover chicken, roasted carrots and onions  Afternoon Snack: none  Dinner:6:30 [facility] food, steamed vegetable (carrots, broccoli, onion snow peas, baby corn) , shrimp, 1 cup brown rice  Evening Snack: 9 pm fruit (1 cup melons)  or sugar free or dairy free ice cream or sugar free pop or pauline crackers (4 rectangles)  Beverages: water, la croix, coffee 2 days per week  Eating out/Take out:at least twice per week, some weeks could be more  Exercise recently rejoined the gym, but nothing structured currently  Calorie needs 1,244 kcals/day Carbs: 30-45 g/meal, 0-15 g/snack         Nutrition Diagnosis:  Inconsistent carbohydrate intake  intake related to Food and nutrition related knowledge deficit concerning appropriate amount and timing of carbohydrate intake as evidenced by  Estimated carbohydrate intake that is different from recommended types or ingested on an irregular basis    Intervention: plate method, label reading, carbohydrate counting, increased plant based foods, meal planning, individualized meal plan and food diary     Treatment Goals: Patient understands education and recommendations, Patient will increase their intake of plant based foods and Patient will count carbohydrates    Monitoring and evaluation:    Term code indicator  FH 1 6 3 Carbohydrate Intake Criteria: 30-45 g carbohydrate per meal, 0-15 g carbohydrate per snack  Term code indicator  FH 4 4 Mealtime Behavior Criteria: Three meals per day, 4-5 hours apart  Two snacks per day, at least 2 hours apart from meals  Patients Response to Instruction:  Mauro Castro  Expected Compliancegood    Thank you for coming to the [facility] for education today  Please feel free to call with any questions or concerns  Start:  3:22 p m    Stop: 4:35 pm  Referred by: DO Crystal Lee, RD  [address] [id number]

## 2022-07-13 NOTE — PATIENT INSTRUCTIONS
30-45 grams of carbohydrate per meal  0-15 grams of carbohydrate per snack    3 meals per day, 4-5 hours apart  2 snacks per day, at least 2 hours apart from meals    Proteins (pages 9-12) : 5-6 oz per day and choose more of the lean and very lean proteins  Fats (page 13): 3-4 servings per day and choose more of the monounsaturated and polyunsaturated fats    Use the Portion Book and label reading to determine carbohydrate amounts in food and beverages

## 2022-07-20 ENCOUNTER — TELEPHONE (OUTPATIENT)
Dept: PULMONOLOGY | Facility: CLINIC | Age: 62
End: 2022-07-20

## 2022-07-20 NOTE — TELEPHONE ENCOUNTER
Lm for pt to call back and schedule 2 month f/u with Dr Halley Fitzpatrick on 8/30/22  Please schedule from the recall

## 2022-08-30 ENCOUNTER — OFFICE VISIT (OUTPATIENT)
Dept: PULMONOLOGY | Facility: CLINIC | Age: 62
End: 2022-08-30
Payer: COMMERCIAL

## 2022-08-30 VITALS
HEIGHT: 64 IN | HEART RATE: 81 BPM | OXYGEN SATURATION: 96 % | WEIGHT: 293 LBS | TEMPERATURE: 97.4 F | DIASTOLIC BLOOD PRESSURE: 80 MMHG | BODY MASS INDEX: 50.02 KG/M2 | SYSTOLIC BLOOD PRESSURE: 122 MMHG

## 2022-08-30 DIAGNOSIS — E66.01 MORBID OBESITY (HCC): Primary | ICD-10-CM

## 2022-08-30 DIAGNOSIS — G47.33 OSA (OBSTRUCTIVE SLEEP APNEA): ICD-10-CM

## 2022-08-30 PROCEDURE — 99214 OFFICE O/P EST MOD 30 MIN: CPT | Performed by: INTERNAL MEDICINE

## 2022-08-30 PROCEDURE — 3074F SYST BP LT 130 MM HG: CPT | Performed by: INTERNAL MEDICINE

## 2022-08-30 PROCEDURE — 3079F DIAST BP 80-89 MM HG: CPT | Performed by: INTERNAL MEDICINE

## 2022-08-30 NOTE — PROGRESS NOTES
Pulmonary Follow Up Note   Ana Gonzalez 65 y o  female MRN: [id number]  8/30/2022      Assessment/Plan:    ANTONETTE (obstructive sleep apnea)  · Severe sleep apnea, recently had split study which showed residual apnea and hypoxia despite use of CPAP, she was up titrated to BiPAP which she is using now  · Review of compliance with residual AHI of 0 7  · She does not feel that the mask is truly comfortable for her however she has been compliant with it and is using at night  · Encouraged diet/exercise and weight loss as this will help her underlying apnea condition  · Will arrange follow-up with one of my sleep medicine colleagues     Morbid obesity ([facility] [address] )  · BMI 50, certainly is contributing to her overall breathing and conditioning   Recommend to continue with weight loss efforts as this will help her overall breathing    Health Maintenance  Immunization History   Administered Date(s) Administered    COVID-19 PFIZER VACCINE 0 3 ML IM 03/27/2021, 04/17/2021, 12/11/2021    Influenza, recombinant, quadrivalent,injectable, preservative free 10/15/2021    Pneumococcal Polysaccharide PPV23 02/22/2021    Tdap 09/14/2021     Return in about 3 months (around 11/30/2022)  History of Present Illness   HPI:  Nathan Rahman is a 65 y o  female with a PMHX of DM2, HLD, HTN, ANTONETTE who had COVID in August 2021 and presents today for follow-up  I last saw Tatiana Kapadia about three months ago   She did have her sleep study and she has diagnosis of severe sleep apnea and was recommended that she start BiPAP   She did make the switch from her auto CPAP to BiPAP around the end of March  We also did adjust some of her pressures that she has felt like the pressures were too high  She has been using her machine nightly, she feels like she can not get more than 4 hours of good sleep  Review of Systems   Constitutional: Negative for activity change, appetite change, chills, fever and unexpected weight change     HENT: Negative for congestion, ear pain, postnasal drip, rhinorrhea, sneezing, sore throat, trouble swallowing and voice change  Respiratory: Negative for cough, chest tightness, shortness of breath and wheezing  Cardiovascular: Negative for chest pain and palpitations  Gastrointestinal: Negative for abdominal distention, constipation, diarrhea, nausea and vomiting  Endocrine: Negative for cold intolerance and heat intolerance  Genitourinary: Negative for dysuria, frequency and urgency  Musculoskeletal: Negative for arthralgias, joint swelling and myalgias  Skin: Negative for color change, pallor and rash  Neurological: Negative for dizziness, weakness, numbness and headaches  Psychiatric/Behavioral: Negative for agitation and confusion  The patient is not nervous/anxious          Historical Information   Past Medical History:   Diagnosis Date    [facility] mellitus [address] [address] )     Hypertension     Pneumonia August 2031    Sleep apnea, obstructive      Past Surgical History:   Procedure Laterality Date    BREAST BIOPSY Right 2016    Done at [address] [place] [address] EXTRACTION       Family History   Problem Relation Age of Onset    Heart disease Mother     Diabetes Mother     Liver cancer Mother     Kidney cancer Mother     Cancer Mother     Heart disease Father     Colon cancer Father     Skin cancer Father     Stroke Brother          Meds/Allergies     Current Outpatient Medications:     amLODIPine-benazepril (LOTREL) 5-40 MG per capsule, take 1 capsule by mouth once daily, Disp: 90 capsule, Rfl: 0    atorvastatin (LIPITOR) 40 mg tablet, take 1 tablet by mouth once daily AFTER DINNER, Disp: 30 tablet, Rfl: 3    cholecalciferol (VITAMIN D3) 1,000 units tablet, Take 1,000 Units by mouth daily, Disp: , Rfl:     furosemide (LASIX) 20 mg tablet, take 1 tablet by mouth once daily if needed for SWELLING, Disp: 90 tablet, Rfl: 0    Melatonin 10 MG CAPS, Take 10 mg by mouth daily at bedtime as needed, Disp: , Rfl:     metFORMIN (GLUCOPHAGE-XR) 500 mg 24 hr tablet, take 2 tablets by mouth twice a day with meals, Disp: 120 tablet, Rfl: 2    Multiple Vitamin (multivitamin) tablet, Take 1 tablet by mouth daily, Disp: , Rfl:     potassium chloride (K-DUR,KLOR-CON) 20 mEq tablet, take 1 tablet by mouth once daily if needed TAKE ONLY WHEN YOU TAKE LASIX, Disp: 30 tablet, Rfl: 5    fluocinolone (SYNALAR) 0 025 % cream, apply to affected area twice a day to [address] (Patient not taking: No sig reported), Disp: , Rfl:     ondansetron (Zofran ODT) 4 mg disintegrating tablet, Take 2 tablets (8 mg total) by mouth every 8 (eight) hours as needed for nausea or vomiting for up to 10 doses (Patient not taking: Reported on 6/14/2022), Disp: 10 tablet, Rfl: 3    oxyCODONE (OXY-IR) 5 MG capsule, Take 1 capsule (5 mg total) by mouth every 6 (six) hours as needed for severe pain for up to 5 doses CAN SUBSTITUTE OXYCODONE TABLETS FOR CAPSULES TAKE AS NEEDED FOR SEVERE PAIN ONLY Max Daily Amount: 20 mg (Patient not taking: No sig reported), Disp: 5 capsule, Rfl: 0  No Known Allergies    Vitals: Blood pressure 122/80, pulse 81, temperature (!) 97 4 °F (36 3 °C), temperature source Tympanic, height 5' 4" (1 626 m), weight 134 kg (296 lb 4 oz), SpO2 96 %  Body mass index is 50 85 kg/m²  Oxygen Therapy  SpO2: 96 %    Physical Exam  Physical Exam  Vitals reviewed  Constitutional:       Appearance: Normal appearance  She is not ill-appearing or toxic-appearing  HENT:      Head: Normocephalic and atraumatic  Nose: Nose normal  No congestion or rhinorrhea  Mouth/Throat:      Mouth: Mucous membranes are moist       Pharynx: Oropharynx is clear  Cardiovascular:      Rate and Rhythm: Normal rate and regular rhythm  Pulses: Normal pulses  Heart sounds: Normal heart sounds  Pulmonary:      Effort: Pulmonary effort is normal  No respiratory distress  Breath sounds: Normal breath sounds   No wheezing, rhonchi or rales    Abdominal:      General: Abdomen is flat  Bowel sounds are normal  There is no distension  Palpations: Abdomen is soft  Tenderness: There is no abdominal tenderness  There is no guarding  Musculoskeletal:         General: No swelling or tenderness  Normal range of motion  Cervical back: Normal range of motion and neck supple  Right lower leg: No edema  Left lower leg: No edema  Skin:     General: Skin is warm and dry  Findings: No rash  Neurological:      General: No focal deficit present  Mental Status: She is alert and oriented to person, place, and time  Mental status is at baseline  Psychiatric:         Mood and Affect: Mood normal          Behavior: Behavior normal          Labs: I have personally reviewed pertinent lab results  Lab Results   Component Value Date    WBC 11 11 (H) 06/01/2022    HGB 12 8 06/01/2022    HCT 42 4 06/01/2022    MCV 87 06/01/2022     06/01/2022     Lab Results   Component Value Date    CALCIUM 9 6 06/01/2022    K 4 5 06/01/2022    CO2 29 06/01/2022     06/01/2022    BUN 19 06/01/2022    CREATININE 0 88 06/01/2022     No results found for: IGE  Lab Results   Component Value Date    ALT 40 02/27/2021    AST 19 02/27/2021    ALKPHOS 66 02/27/2021       Imaging and other studies: I have personally reviewed pertinent reports    and I have personally reviewed pertinent films in PACS     Pulmonary function testing:   FEV1/FVC Ratio: 86 %  FEV1: 1 92 L     76 % predicted  Forced Vital Capacity: 2 25 L    72 % predicted  After administration of bronchodilator:  No change     Lung volumes: Total Lung Capacity 83 % predicted Residual volume 97 % predicted     DLCO corrected for patients hemoglobin level: 61 % predicted     EKG, Pathology, and Other Studies: I have personally reviewed pertinent reports    and I have personally reviewed pertinent films in PACS    [facility] Garner    [facility]    Answers for HPI/ROS submitted by the patient on 8/29/2022  Do you have shortness of breath that occurs with effort or exertion?: No  Do you have ear congestion?: No  Do you have heartburn?: No  Do you have fatigue?: No  Do you have nasal congestion?: No  Do you have shortness of breath when lying flat?: No  Do you have shortness of breath when you wake up?: No  Do you have sweats?: No  Have you experienced weight loss?: No  Which of the following makes your symptoms worse?: nothing  Which of the following makes your symptoms better?: nothing

## 2022-09-16 ENCOUNTER — TELEPHONE (OUTPATIENT)
Dept: INTERNAL MEDICINE CLINIC | Facility: CLINIC | Age: 62
End: 2022-09-16

## 2022-09-16 ENCOUNTER — TELEMEDICINE (OUTPATIENT)
Dept: INTERNAL MEDICINE CLINIC | Facility: CLINIC | Age: 62
End: 2022-09-16
Payer: COMMERCIAL

## 2022-09-16 DIAGNOSIS — G47.33 OSA (OBSTRUCTIVE SLEEP APNEA): ICD-10-CM

## 2022-09-16 DIAGNOSIS — J20.9 ACUTE BRONCHITIS, UNSPECIFIED ORGANISM: Primary | ICD-10-CM

## 2022-09-16 PROCEDURE — 99213 OFFICE O/P EST LOW 20 MIN: CPT | Performed by: HOSPITALIST

## 2022-09-16 RX ORDER — FLUTICASONE PROPIONATE 50 MCG
2 SPRAY, SUSPENSION (ML) NASAL DAILY
Qty: 15.8 ML | Refills: 1 | Status: SHIPPED | OUTPATIENT
Start: 2022-09-16 | End: 2022-09-19

## 2022-09-16 RX ORDER — BENZONATATE 100 MG/1
100 CAPSULE ORAL 3 TIMES DAILY PRN
Qty: 20 CAPSULE | Refills: 0 | Status: SHIPPED | OUTPATIENT
Start: 2022-09-16

## 2022-09-16 NOTE — ASSESSMENT & PLAN NOTE
Currently using BIPAP, but reports no having used in past 2 nights due to worsening cough    Will manage her current symptoms in other to ensure better complaince to using BIPAP

## 2022-09-16 NOTE — PROGRESS NOTES
Virtual Regular Visit    Verification of patient location:    Patient is located in the following state in which I hold an active license Other; [place]      Assessment/Plan:    Problem List Items Addressed This Visit        Respiratory    Acute bronchitis - Primary     Patient has one week history of dry cough and sore throat  And rhinorrhea, with no fever or myalgia  Denies chest pain, shortness of breath, or oxygen requirements  No reported sick contacts   Negative covid test x3  DDX probably RSV bronchitis with post nasal drip- currently RSV epidemic  Plan  Tesalone perles 100mg  Flonase spray  Non pharmacologic therapy; steam therapy, hot teas, ginger tea with honey, lozenges as needed  Please call the doctor if worsening of cough, shortness of breath, chest pain with or without fever         Relevant Medications    benzonatate (TESSALON PERLES) 100 mg capsule    fluticasone (FLONASE) 50 mcg/act nasal spray    ANTONETTE (obstructive sleep apnea)     Currently using BIPAP, but reports no having used in past 2 nights due to worsening cough  Will manage her current symptoms in other to ensure better complaince to using BIPAP                    Reason for visit is   Chief Complaint   Patient presents with    Virtual Regular Visit        Encounter provider Isrrael Chapman MD    Provider located at [address] [place] [address] [address] [facility]  [phone]      Recent Visits  No visits were found meeting these conditions    Showing recent visits within past 7 days and meeting all other requirements  Today's Visits  Date Type Provider Dept   09/16/22 Telemedicine Isrrael Chapman MD [facility]   09/16/22 Telephone Zhane Perdue  [facility]   09/16/22 Telephone Nina Cage  [facility] Med [facility]   Showing today's visits and meeting all other requirements  Future Appointments  No visits were found meeting these conditions  Showing future appointments within next 150 days and meeting all other requirements       The patient was identified by name and date of birth  Nathan Rahman was informed that this is a telemedicine visit and that the visit is being conducted through [facility] and patient was informed this is a secure, HIPAA-complaint platform  She agrees to proceed     My office door was closed  The patient was notified the following individuals were present in the room Attending Dr Priscilla Mcmullen   She acknowledged consent and understanding of privacy and security of the video platform  The patient has agreed to participate and understands they can discontinue the visit at any time  Patient is aware this is a billable service  Claudean Partridge is a 65 y o  female with complaints of cough and sorethroat   HPI   Nathan Rahman is a 65 y o  female with a PMHX of DM2, HLD, HTN, ANTONETTE who had COVID in August 2022  She uses BIPAP for sever sleep apnea  Patient seen today for video visit with reports of cough and sore throat which started a week ago  She denies any fever, chest pain , shortness of breath, fatigue or myalgia  She took three [facility] Covid-19 test which were all negative, but is very concerned she might still be having covid  She reports dry nagging cough with some chest discomfort due to repeated coughing and also sensation of ear fullness, no wheezes  She took mucinex at home , sore thoat seems to be getting better but cough is persistent  No increased oxygen requirements  She has no sick contacts, or recent contact with someone coughing, reports taking abundant precautions and socially distancing   No smoking, no GI reflux at the moment    Past Medical History:   Diagnosis Date    Diabetes mellitus ([facility] [address] )     Hypertension     Pneumonia August 2031    Sleep apnea, obstructive        Past Surgical History:   Procedure Laterality Date    BREAST BIOPSY Right 2016    [facility] at [facility]    WISDOM TOOTH EXTRACTION         Current Outpatient Medications   Medication Sig Dispense Refill    benzonatate (TESSALON PERLES) 100 mg capsule Take 1 capsule (100 mg total) by mouth 3 (three) times a day as needed for cough 20 capsule 0    fluticasone (FLONASE) 50 mcg/act nasal spray 2 sprays into each nostril daily for 7 days 15 8 mL 1    amLODIPine-benazepril (LOTREL) 5-40 MG per capsule take 1 capsule by mouth once daily 90 capsule 0    atorvastatin (LIPITOR) 40 mg tablet take 1 tablet by mouth once daily AFTER DINNER 30 tablet 3    cholecalciferol (VITAMIN D3) 1,000 units tablet Take 1,000 Units by mouth daily      furosemide (LASIX) 20 mg tablet take 1 tablet by mouth once daily if needed for SWELLING 90 tablet 0    Melatonin 10 MG CAPS Take 10 mg by mouth daily at bedtime as needed      metFORMIN (GLUCOPHAGE-XR) 500 mg 24 hr tablet take 2 tablets by mouth twice a day with meals 120 tablet 2    Multiple Vitamin (multivitamin) tablet Take 1 tablet by mouth daily      potassium chloride (K-DUR,KLOR-CON) 20 mEq tablet take 1 tablet by mouth once daily if needed TAKE ONLY WHEN YOU TAKE LASIX 30 tablet 5     No current facility-administered medications for this visit  No Known Allergies    Review of Systems   Constitutional: Negative for activity change, appetite change and chills  HENT: Positive for congestion and sore throat  Negative for drooling, ear discharge, ear pain, sinus pressure, trouble swallowing and voice change  Respiratory: Positive for cough  Negative for choking, chest tightness, shortness of breath, wheezing and stridor  Gastrointestinal: Negative for abdominal distention and abdominal pain  Genitourinary: Negative for decreased urine volume and difficulty urinating  Musculoskeletal: Negative for arthralgias and back pain  Neurological: Negative for dizziness  Psychiatric/Behavioral: Negative for agitation, behavioral problems and confusion  Video Exam    There were no vitals filed for this visit      Physical Exam     I spent 30 minutes directly with the patient during this visit

## 2022-09-16 NOTE — TELEPHONE ENCOUNTER
Hima Pope called about making appt for fredy, he said cough and chest hurting from coughing,  I tried to call to make appt called fredy and manda numbers and no one answered  Left message to call us

## 2022-09-16 NOTE — ASSESSMENT & PLAN NOTE
Patient has one week history of dry cough and sore throat  And rhinorrhea, with no fever or myalgia    Denies chest pain, shortness of breath, or oxygen requirements  No reported sick contacts   Negative covid test x3  DDX probably RSV bronchitis with post nasal drip- currently RSV epidemic  Plan  Tesalone perles 100mg  Flonase spray  Non pharmacologic therapy; steam therapy, hot teas, ginger tea with honey, lozenges as needed  Please call the doctor if worsening of cough, shortness of breath, chest pain with or without fever

## 2022-09-16 NOTE — TELEPHONE ENCOUNTER
Alayna Jones has called the hospitals office in regards to Lalito Thanhhunter Pope stated Teresa Medina is currently experiencing a persisted cough that is not going away  Hima Pope stated Teresa Medina has a history of ANTONETTE and a past covid patient  Hima Pope stated he is worried and requested a same day appointment  Informed Hima Pope there are no available appointments for today, and offered Hima Pope appointments for Monday, 09/19/2022, which Jerry refused  Informed Jerry if Ana wished to be seen she would need to go to Urgent Care or the ER  Jerry verbally understood the information and requested to be called back if any patients cancel their appointment with the  office for today  Hima Pope ended the call soon after

## 2022-09-19 ENCOUNTER — OFFICE VISIT (OUTPATIENT)
Dept: INTERNAL MEDICINE CLINIC | Facility: CLINIC | Age: 62
End: 2022-09-19
Payer: COMMERCIAL

## 2022-09-19 ENCOUNTER — HOSPITAL ENCOUNTER (OUTPATIENT)
Dept: RADIOLOGY | Facility: HOSPITAL | Age: 62
Discharge: HOME | End: 2022-09-19
Attending: INTERNAL MEDICINE
Payer: COMMERCIAL

## 2022-09-19 VITALS
SYSTOLIC BLOOD PRESSURE: 142 MMHG | TEMPERATURE: 98.4 F | HEART RATE: 97 BPM | BODY MASS INDEX: 50.02 KG/M2 | HEIGHT: 64 IN | WEIGHT: 293 LBS | DIASTOLIC BLOOD PRESSURE: 82 MMHG | OXYGEN SATURATION: 93 %

## 2022-09-19 DIAGNOSIS — J20.9 ACUTE BRONCHITIS, UNSPECIFIED ORGANISM: Primary | ICD-10-CM

## 2022-09-19 DIAGNOSIS — J06.9 UPPER RESPIRATORY TRACT INFECTION, UNSPECIFIED TYPE: Primary | ICD-10-CM

## 2022-09-19 DIAGNOSIS — J20.9 ACUTE BRONCHITIS, UNSPECIFIED ORGANISM: ICD-10-CM

## 2022-09-19 PROCEDURE — 71046 X-RAY EXAM CHEST 2 VIEWS: CPT

## 2022-09-19 PROCEDURE — 99213 OFFICE O/P EST LOW 20 MIN: CPT | Performed by: INTERNAL MEDICINE

## 2022-09-19 PROCEDURE — 3077F SYST BP >= 140 MM HG: CPT | Performed by: INTERNAL MEDICINE

## 2022-09-19 PROCEDURE — 3079F DIAST BP 80-89 MM HG: CPT | Performed by: INTERNAL MEDICINE

## 2022-09-19 RX ORDER — AZITHROMYCIN 250 MG/1
TABLET, FILM COATED ORAL
Qty: 6 TABLET | Refills: 0 | Status: SHIPPED | OUTPATIENT
Start: 2022-09-19 | End: 2022-09-24

## 2022-09-19 NOTE — PROGRESS NOTES
INTERNAL MEDICINE FOLLOW-UP OFFICE VISIT  [facility] INTERNAL MEDICINE [facility]    NAME: Wilfrido Ron  AGE: 65 y o  SEX: female  : 1960     DATE: 2022     Assessment and Plan:     URI (upper respiratory infection)  - HA, congestion, cough, post-nasal drip for 10+ days  - had chills and fatigue starting last night  - continue tessalon perrles  - continue other supportive measures - humidifier, warm tea with honey  - 5 day course of azithromycin      No follow-ups on file  Chief Complaint:     Chief Complaint   Patient presents with    Follow-up     Cough, sweats, headaches, chest pain        History of Present Illness:     Patient reports that on 2022 she began feeling post-nasal drainage associated with sore throat and cough  Also associated having some sinus pressure and headaches  This continued for the next week until she had a telemedicine appointment  She was given tessalon perrles which did help with her cough  Yesterday she says she began having chills and shaking along with worsening fatigue  Her cough is non-productive, she has not measured any fevers at home  No other sick contacts she is aware of  The following portions of the patient's history were reviewed and updated as appropriate: allergies, current medications, past family history, past medical history, past social history, past surgical history and problem list      Review of Systems:     Review of Systems   Constitutional: Positive for appetite change, chills, diaphoresis and fatigue  Negative for fever  HENT: Positive for congestion, postnasal drip and sore throat  Negative for rhinorrhea and sinus pressure  Respiratory: Positive for cough  Negative for shortness of breath  Cardiovascular: Negative for leg swelling  Gastrointestinal: Positive for vomiting  Negative for abdominal pain and nausea  Genitourinary: Negative for dysuria and hematuria     Neurological: Positive for headaches  Problem List:     Patient Active Problem List   Diagnosis    Type 2 diabetes mellitus without complication, without long-term current use of insulin ([facility] [address] )    Essential hypertension    Depression    Morbid obesity ([facility] [address] )    Screening for colon cancer    [facility]    Annual physical exam    Leg swelling    Muscle cramps    COVID-19    Post-COVID-19 condition    ANTONETTE (obstructive sleep apnea)    Impaired cognition    Recurrent dry cough    Need for influenza vaccination    Chills, diarrhea, fatigue, back pain    URI (upper respiratory infection)        Objective:     /82 (BP Location: Right arm, Patient Position: Sitting, Cuff Size: Standard)   Pulse 97   Temp 98 4 °F (36 9 °C) (Tympanic)   Ht 5' 4" (1 626 m)   Wt 133 kg (294 lb 3 2 oz)   SpO2 93%   BMI 50 50 kg/m²     Physical Exam  Constitutional:       General: She is not in acute distress  Appearance: She is obese  HENT:      Head: Normocephalic and atraumatic  Right Ear: External ear normal       Left Ear: External ear normal       Nose: Nose normal       Mouth/Throat:      Mouth: Mucous membranes are moist       Pharynx: Oropharynx is clear  Posterior oropharyngeal erythema present  No oropharyngeal exudate  Eyes:      Extraocular Movements: Extraocular movements intact  Cardiovascular:      Rate and Rhythm: Normal rate and regular rhythm  Pulses: Normal pulses  Heart sounds: No murmur heard  Pulmonary:      Effort: Pulmonary effort is normal       Breath sounds: Normal breath sounds  No wheezing, rhonchi or rales  Abdominal:      Palpations: Abdomen is soft  Tenderness: There is no abdominal tenderness  Musculoskeletal:      Right lower leg: Edema present  Left lower leg: Edema present  Lymphadenopathy:      Cervical: No cervical adenopathy  Skin:     General: Skin is warm and dry     Neurological:      Mental Status: She is alert and oriented to person, place, and time  Mental status is at baseline  Psychiatric:         Mood and Affect: Mood normal          Thought Content:  Thought content normal          Pertinent Laboratory/Diagnostic Studies:    Radiology/Other Diagnostic Testing Results: I have personally reviewed pertinent films in PACS CXR done 9/19/22 appears normal on my read although awaiting read by radiology    Sunshine Seaman DO  [address]

## 2022-09-19 NOTE — ASSESSMENT & PLAN NOTE
- HA, congestion, cough, post-nasal drip for 10+ days  - had chills and fatigue starting last night  - continue tessalon perrles  - continue other supportive measures - humidifier, warm tea with honey  - 5 day course of azithromycin

## 2022-10-05 ENCOUNTER — OFFICE VISIT (OUTPATIENT)
Dept: INTERNAL MEDICINE CLINIC | Facility: CLINIC | Age: 62
End: 2022-10-05
Payer: COMMERCIAL

## 2022-10-05 VITALS
BODY MASS INDEX: 49.89 KG/M2 | WEIGHT: 292.2 LBS | HEART RATE: 85 BPM | DIASTOLIC BLOOD PRESSURE: 80 MMHG | TEMPERATURE: 98 F | HEIGHT: 64 IN | OXYGEN SATURATION: 96 % | SYSTOLIC BLOOD PRESSURE: 130 MMHG

## 2022-10-05 DIAGNOSIS — R21 RASH: Primary | ICD-10-CM

## 2022-10-05 PROBLEM — U07.1 COVID-19: Status: RESOLVED | Noted: 2021-08-18 | Resolved: 2022-10-05

## 2022-10-05 PROBLEM — Z00.00 ANNUAL PHYSICAL EXAM: Status: RESOLVED | Noted: 2021-03-22 | Resolved: 2022-10-05

## 2022-10-05 PROCEDURE — 99213 OFFICE O/P EST LOW 20 MIN: CPT | Performed by: INTERNAL MEDICINE

## 2022-10-05 NOTE — PROGRESS NOTES
Assessment/Plan:    Rash  Likely allergic reaction to z-pack due to the time frame of events  She is already off it, and rash is overall improving  Continue benadryl as needed 2x a day, topical hydrocortisone 2 5% to the areas affected with erythematous rash for 1 week  To call in 1 week if no significant improvement  Diagnoses and all orders for this visit:    Rash  -     hydrocortisone 2 5 % cream; Apply topically 2 (two) times a day for 7 days          Subjective:      Patient ID: Delmy Regalado is a 65 y o  female  Brookedra Abraham presents in the office with complaining of erythematous pruritic rash in her chest, back and upper arms that started about 1 week and half ago  Patient was prescribed a Z-Cirilo on September 19, she noticed after 2-3 days she started taking the medication the rash appeared associated with itching all over  She has been taking Benadryl with some relief of the itching  The rash is now less severe, but is still present on her upper chest  She still taking benadryl twice a day  She has only used skin moisturizers over the rash  The following portions of the patient's history were reviewed and updated as appropriate: allergies, current medications, past family history, past medical history, past social history, past surgical history and problem list     Review of Systems   Constitutional: Negative for chills and fever  HENT: Negative for trouble swallowing  Respiratory: Negative for choking and shortness of breath  Cardiovascular: Negative for chest pain  Skin: Positive for rash  Negative for wound  itching   Psychiatric/Behavioral: Negative for confusion and sleep disturbance  Objective:      /80 (BP Location: Left arm, Patient Position: Sitting, Cuff Size: Large)   Pulse 85   Temp 98 °F (36 7 °C) (Tympanic)   Ht 5' 4" (1 626 m)   Wt 133 kg (292 lb 3 2 oz)   SpO2 96%   BMI 50 16 kg/m²          Physical Exam  Vitals and nursing note reviewed  Constitutional:       General: She is not in acute distress  Appearance: She is well-developed  HENT:      Head: Normocephalic and atraumatic  Eyes:      Conjunctiva/sclera: Conjunctivae normal       Pupils: Pupils are equal, round, and reactive to light  Neck:      Thyroid: No thyromegaly  Cardiovascular:      Rate and Rhythm: Normal rate and regular rhythm  Pulmonary:      Effort: Pulmonary effort is normal  No respiratory distress  Musculoskeletal:         General: Normal range of motion  Skin:     General: Skin is warm and dry  Capillary Refill: Capillary refill takes less than 2 seconds  Findings: Rash (small scattered erythematous papule on upper chest) present  Neurological:      Mental Status: She is alert and oriented to person, place, and time  Sensory: No sensory deficit  Motor: No weakness or abnormal muscle tone  Psychiatric:         Thought Content:  Thought content normal          Judgment: Judgment normal

## 2022-10-12 DIAGNOSIS — E11.9 TYPE 2 DIABETES MELLITUS WITHOUT COMPLICATION, WITHOUT LONG-TERM CURRENT USE OF INSULIN (HCC): ICD-10-CM

## 2022-10-12 DIAGNOSIS — I10 ESSENTIAL HYPERTENSION: ICD-10-CM

## 2022-10-12 PROBLEM — Z12.11 SCREENING FOR COLON CANCER: Status: RESOLVED | Noted: 2021-03-22 | Resolved: 2022-10-12

## 2022-10-12 PROBLEM — R05.8 RECURRENT DRY COUGH: Status: RESOLVED | Noted: 2021-10-15 | Resolved: 2022-10-12

## 2022-10-12 RX ORDER — METFORMIN HYDROCHLORIDE 500 MG/1
TABLET, EXTENDED RELEASE ORAL
Qty: 120 TABLET | Refills: 2 | Status: SHIPPED | OUTPATIENT
Start: 2022-10-12

## 2022-10-12 RX ORDER — AMLODIPINE BESYLATE AND BENAZEPRIL HYDROCHLORIDE 5; 40 MG/1; MG/1
CAPSULE ORAL
Qty: 90 CAPSULE | Refills: 0 | Status: SHIPPED | OUTPATIENT
Start: 2022-10-12

## 2022-10-12 NOTE — TELEPHONE ENCOUNTER
Ez Birmingham has requested refills of metFORMIN (GLUCOPHAGE-XR) 500 mg 24 hr tablet and amLODIPine-benazepril (LOTREL) 5-40 mg per capsule  Would refill be appropriate?

## 2022-11-08 ENCOUNTER — TELEPHONE (OUTPATIENT)
Dept: OTHER | Facility: OTHER | Age: 62
End: 2022-11-08

## 2022-11-08 NOTE — TELEPHONE ENCOUNTER
Called Mary Prior in regards to her cancelled IM appointment for 10:00AM today, 11/08/2022  Spoke with Tanner Fabian and offered to reschedule the previous appointment  Tanner Fabian stated she would not want to schedule an appointment  Tanner Fabian stated she has not had a cough or any issues last night and today  Tanner Fabian stated she is feeling fine and does not want to schedule an appointment  Ended the call soon after

## 2022-11-08 NOTE — TELEPHONE ENCOUNTER
Patient is calling regarding cancelling an appointment      Date/Time: 11/8 at 10:00    Patient was rescheduled: YES [] NO [x]    Patient requesting call back to reschedule: YES [x] NO []

## 2022-11-23 DIAGNOSIS — R25.2 MUSCLE CRAMPS: ICD-10-CM

## 2022-11-23 RX ORDER — POTASSIUM CHLORIDE 20 MEQ/1
20 TABLET, EXTENDED RELEASE ORAL DAILY
Qty: 30 TABLET | Refills: 5 | Status: SHIPPED | OUTPATIENT
Start: 2022-11-23

## 2022-11-23 NOTE — TELEPHONE ENCOUNTER
Tavo Vo has called the IM office in regards to Lalito Valencia Needs has requested a refill of potassium chloride (K-DUR, KLOR-CON) 20 mEq tablet  Requested refill to be sent to the [facility] on [address]

## 2022-12-15 ENCOUNTER — TELEMEDICINE (OUTPATIENT)
Dept: INTERNAL MEDICINE CLINIC | Facility: CLINIC | Age: 62
End: 2022-12-15

## 2022-12-15 DIAGNOSIS — U07.1 COVID-19: Primary | ICD-10-CM

## 2022-12-15 RX ORDER — FLUTICASONE PROPIONATE 110 UG/1
4 AEROSOL, METERED RESPIRATORY (INHALATION) 2 TIMES DAILY
Qty: 12 G | Refills: 0 | Status: SHIPPED | OUTPATIENT
Start: 2022-12-15 | End: 2022-12-29

## 2022-12-15 RX ORDER — NIRMATRELVIR AND RITONAVIR 300-100 MG
3 KIT ORAL 2 TIMES DAILY
Qty: 30 TABLET | Refills: 0 | Status: SHIPPED | OUTPATIENT
Start: 2022-12-15 | End: 2022-12-20

## 2022-12-15 NOTE — ASSESSMENT & PLAN NOTE
Patient complains of congestion, cough with clear sputum, headache, fatigue and chills for 2 days  She tested positive for home COVID antigen test today  No fever or SOB  Saturation is >93%  Not on oxygen at baseline  She was not in distress during televisit  Could complete sentences without pauses in between  Alert and orient  Answered my questions appropriately  Plan  Paxlovid for 5 days  Explained adverse effects and patient verbalized understanding  Steroid inhaler  Hold atorvastatin for 7 days  (During and 2 days after completing antiviral treatments)  Follow up on Monday

## 2022-12-15 NOTE — PROGRESS NOTES
Virtual Regular Visit    Verification of patient location:    Patient is located in the following state in which I hold an active license [place]      Assessment/Plan:    Problem List Items Addressed This Visit        Other    COVID-19 - Primary     Patient complains of congestion, cough with clear sputum, headache, fatigue and chills for 2 days  She tested positive for home COVID antigen test today  No fever or SOB  Saturation is >93%  Not on oxygen at baseline  She was not in distress during televisit  Could complete sentences without pauses in between  Alert and orient  Answered my questions appropriately  Plan  Paxlovid for 5 days  Explained adverse effects and patient verbalized understanding  Steroid inhaler  Hold atorvastatin for 7 days  (During and 2 days after completing antiviral treatments)  Follow up on Monday  Relevant Medications    fluticasone (FLOVENT HFA) 110 MCG/ACT inhaler    nirmatrelvir & ritonavir (Paxlovid, 300/100,) tablet therapy pack            Reason for visit is No chief complaint on file  Encounter provider Meaghan Mejia MD    Provider located at [address] [place] [address] [address] [place] [id number] [phone]      Recent Visits  No visits were found meeting these conditions  Showing recent visits within past 7 days and meeting all other requirements  Today's Visits  Date Type Provider Dept   12/15/22 Telemedicine Meaghan Mejia MD  [facility] [place]   Showing today's visits and meeting all other requirements  Future Appointments  No visits were found meeting these conditions  Showing future appointments within next 150 days and meeting all other requirements       The patient was identified by name and date of birth   Vasiliy Harvey was informed that this is a telemedicine visit and that the visit is being conducted through the [employer] platform  She agrees to proceed     My office door was closed  No one else was in the room  She acknowledged consent and understanding of privacy and security of the video platform  The patient has agreed to participate and understands they can discontinue the visit at any time  Patient is aware this is a billable service  Riki Gottron is a 65 y o  female who called the office due to positive home COVID antigen test  Her symptoms started yesterday  Having congestion, cough with clear sputum, headache, fatigue and chills  No fever, No SOB  Saturation was >93%  Not on oxygen at baseline  She is not in distress  Could complete sentences without pauses in between  Alert and orient  Answered my questions appropriately  HPI     Past Medical History:   Diagnosis Date   • Annual physical exam 3/22/2021   • COVID-19 8/18/2021   • Diabetes mellitus ([facility] [address] )    • Hypertension    • Pneumonia August 2031   • Sleep apnea, obstructive        Past Surgical History:   Procedure Laterality Date   • BREAST BIOPSY Right 2016    Done at [facility] 12 EXTRACTION         Current Outpatient Medications   Medication Sig Dispense Refill   • fluticasone (FLOVENT HFA) 110 MCG/ACT inhaler Inhale 4 puffs 2 (two) times a day for 14 days Rinse mouth after use   12 g 0   • nirmatrelvir & ritonavir (Paxlovid, 300/100,) tablet therapy pack Take 3 tablets by mouth 2 (two) times a day for 5 days Take 2 nirmatrelvir tablets + 1 ritonavir tablet together per dose 30 tablet 0   • amLODIPine-benazepril (LOTREL) 5-40 MG per capsule take 1 capsule by mouth once daily 90 capsule 0   • atorvastatin (LIPITOR) 40 mg tablet take 1 tablet by mouth once daily AFTER DINNER 30 tablet 3   • benzonatate (TESSALON PERLES) 100 mg capsule Take 1 capsule (100 mg total) by mouth 3 (three) times a day as needed for cough 20 capsule 0   • cholecalciferol (VITAMIN D3) 1,000 units tablet Take 1,000 Units by mouth daily • furosemide (LASIX) 20 mg tablet take 1 tablet by mouth once daily if needed for SWELLING 90 tablet 0   • hydrocortisone 2 5 % cream Apply topically 2 (two) times a day for 7 days 28 g 1   • Melatonin 10 MG CAPS Take 10 mg by mouth daily at bedtime as needed     • metFORMIN (GLUCOPHAGE-XR) 500 mg 24 hr tablet take 2 tablets by mouth twice a day with meals 120 tablet 2   • Multiple Vitamin (multivitamin) tablet Take 1 tablet by mouth daily     • potassium chloride (K-DUR,KLOR-CON) 20 mEq tablet Take 1 tablet (20 mEq total) by mouth daily 30 tablet 5     No current facility-administered medications for this visit  Allergies   Allergen Reactions   • Azithromycin Itching and Rash       Review of Systems   Constitutional: Positive for chills and fatigue  Negative for appetite change and fever  HENT: Positive for congestion  Negative for ear pain, sinus pressure, sinus pain, sneezing and sore throat  Respiratory: Positive for cough  Negative for choking, shortness of breath and wheezing  Cardiovascular: Negative for chest pain, palpitations and leg swelling  Gastrointestinal: Negative for abdominal pain, diarrhea, nausea and vomiting  Genitourinary: Negative for dysuria, flank pain and frequency  Musculoskeletal: Negative for arthralgias and myalgias  Skin: Negative for color change and pallor  Neurological: Positive for headaches  Negative for dizziness, seizures, syncope and light-headedness  Psychiatric/Behavioral: Negative for agitation, behavioral problems and confusion  Video Exam    There were no vitals filed for this visit  Physical Exam  Constitutional:       General: She is not in acute distress  Appearance: She is obese  She is ill-appearing  She is not toxic-appearing or diaphoretic  Comments: No respiratory distress  She could complete sentences without pauses  Answered my questions appropriately  No confusion     Neurological:      Mental Status: She is alert and oriented to person, place, and time     Psychiatric:         Behavior: Behavior normal           I spent 15 minutes directly with the patient during this visit

## 2023-01-24 DIAGNOSIS — I10 ESSENTIAL HYPERTENSION: ICD-10-CM

## 2023-01-25 DIAGNOSIS — E11.9 TYPE 2 DIABETES MELLITUS WITHOUT COMPLICATION, WITHOUT LONG-TERM CURRENT USE OF INSULIN (HCC): ICD-10-CM

## 2023-01-25 RX ORDER — AMLODIPINE BESYLATE AND BENAZEPRIL HYDROCHLORIDE 5; 40 MG/1; MG/1
CAPSULE ORAL
Qty: 90 CAPSULE | Refills: 0 | Status: SHIPPED | OUTPATIENT
Start: 2023-01-25

## 2023-01-26 RX ORDER — ATORVASTATIN CALCIUM 40 MG/1
40 TABLET, FILM COATED ORAL DAILY
Qty: 30 TABLET | Refills: 0 | Status: SHIPPED | OUTPATIENT
Start: 2023-01-26

## 2023-02-17 ENCOUNTER — TELEMEDICINE (OUTPATIENT)
Dept: INTERNAL MEDICINE CLINIC | Facility: CLINIC | Age: 63
End: 2023-02-17

## 2023-02-17 DIAGNOSIS — J06.9 UPPER RESPIRATORY TRACT INFECTION, UNSPECIFIED TYPE: ICD-10-CM

## 2023-02-17 DIAGNOSIS — J00 ACUTE RHINITIS: Primary | ICD-10-CM

## 2023-02-17 PROBLEM — U09.9 POST-COVID-19 CONDITION: Status: RESOLVED | Noted: 2021-09-02 | Resolved: 2023-02-17

## 2023-02-17 PROBLEM — R68.83 CHILLS: Status: RESOLVED | Noted: 2022-06-03 | Resolved: 2023-02-17

## 2023-02-17 PROBLEM — U07.1 COVID-19: Status: RESOLVED | Noted: 2021-08-18 | Resolved: 2023-02-17

## 2023-02-17 PROBLEM — R21 RASH: Status: RESOLVED | Noted: 2022-10-05 | Resolved: 2023-02-17

## 2023-02-17 RX ORDER — FLUTICASONE PROPIONATE 50 MCG
2 SPRAY, SUSPENSION (ML) NASAL DAILY
Qty: 9.9 ML | Refills: 0 | Status: SHIPPED | OUTPATIENT
Start: 2023-02-17 | End: 2023-02-24

## 2023-02-17 RX ORDER — BENZONATATE 100 MG/1
100 CAPSULE ORAL 3 TIMES DAILY PRN
Qty: 20 CAPSULE | Refills: 0 | Status: SHIPPED | OUTPATIENT
Start: 2023-02-17

## 2023-02-17 NOTE — PROGRESS NOTES
Virtual Regular Visit    Verification of patient location:    Patient is located in the following state in which I hold an active license [place]      Assessment/Plan:    Problem List Items Addressed This Visit        Respiratory    URI (upper respiratory infection)     Conservative management with over-the-counter medication along with Claritin-D, Flonase and Tessalon Perles  Increase oral hydration  Oriented to go to the ER or urgent care if symptoms does not improve in the next 2 to 3 days  Acute rhinitis - Primary     In the setting of upper respiratory infection, conservative treatment with Claritin-D and Flonase  Relevant Medications    benzonatate (TESSALON PERLES) 100 mg capsule    fluticasone (FLONASE) 50 mcg/act nasal spray    loratadine-pseudoephedrine (CLARITIN-D 12-HOUR) 5-120 mg per tablet            Reason for visit is   Chief Complaint   Patient presents with   • Virtual Regular Visit        Encounter provider Mayra Bragg MD    Provider located at [address] [place] [address] [address] [place] [address]  [phone]      Recent Visits  No visits were found meeting these conditions  Showing recent visits within past 7 days and meeting all other requirements  Today's Visits  Date Type Provider Dept   02/17/23 Telemedicine Mayra Bragg MD [facility] [place]   Showing today's visits and meeting all other requirements  Future Appointments  No visits were found meeting these conditions  Showing future appointments within next 150 days and meeting all other requirements       The patient was identified by name and date of birth  Gardenia Walter was informed that this is a telemedicine visit and that the visit is being conducted through the [employer]  She agrees to proceed     My office door was closed  No one else was in the room    She acknowledged consent and understanding of privacy and security of the video platform  The patient has agreed to participate and understands they can discontinue the visit at any time  Patient is aware this is a billable service  Tori Moreno is a 59 y o  female  Patient is evaluated by telemedicine today because of complaining of nasal congestion, dry cough, headache, fatigue and muscle aches for the past 4 to 5 days  She states that she tried Tylenol and Tessalon Perles with some relief of her symptoms  She did test herself for COVID with home test x2 and it was negative  She denies any sick contacts  She denies any fever or chills  She denies chest pain, shortness of breath or productive cough         Past Medical History:   Diagnosis Date   • Annual physical exam 3/22/2021   • Chills, diarrhea, fatigue, back pain 6/3/2022   • COVID-19 8/18/2021   • Diabetes mellitus ([facility] [address] )    • Hypertension    • Pneumonia August 2031   • Post-COVID-19 condition 9/2/2021   • Rash 10/5/2022   • Sleep apnea, obstructive        Past Surgical History:   Procedure Laterality Date   • BREAST BIOPSY Right 2016    Done at [facility] 12 EXTRACTION         Current Outpatient Medications   Medication Sig Dispense Refill   • benzonatate (TESSALON PERLES) 100 mg capsule Take 1 capsule (100 mg total) by mouth 3 (three) times a day as needed for cough 20 capsule 0   • fluticasone (FLONASE) 50 mcg/act nasal spray 2 sprays into each nostril daily for 7 days 9 9 mL 0   • loratadine-pseudoephedrine (CLARITIN-D 12-HOUR) 5-120 mg per tablet Take 1 tablet by mouth 2 (two) times a day 20 tablet 0   • amLODIPine-benazepril (LOTREL) 5-40 MG per capsule take 1 capsule by mouth once daily 90 capsule 0   • atorvastatin (LIPITOR) 40 mg tablet Take 1 tablet (40 mg total) by mouth daily 1 40 mg tab daily 30 tablet 0   • cholecalciferol (VITAMIN D3) 1,000 units tablet Take 1,000 Units by mouth daily     • fluticasone (FLOVENT HFA) 110 MCG/ACT inhaler Inhale 4 puffs 2 (two) times a day for 14 days Rinse mouth after use  12 g 0   • furosemide (LASIX) 20 mg tablet take 1 tablet by mouth once daily if needed for SWELLING 90 tablet 0   • hydrocortisone 2 5 % cream Apply topically 2 (two) times a day for 7 days 28 g 1   • Melatonin 10 MG CAPS Take 10 mg by mouth daily at bedtime as needed     • metFORMIN (GLUCOPHAGE-XR) 500 mg 24 hr tablet take 2 tablets by mouth twice a day with meals 120 tablet 2   • Multiple Vitamin (multivitamin) tablet Take 1 tablet by mouth daily     • potassium chloride (K-DUR,KLOR-CON) 20 mEq tablet Take 1 tablet (20 mEq total) by mouth daily 30 tablet 5     No current facility-administered medications for this visit  Allergies   Allergen Reactions   • Azithromycin Itching and Rash       Review of Systems   Constitutional: Positive for fatigue  Negative for chills and fever  HENT: Positive for congestion, postnasal drip and rhinorrhea  Negative for sore throat and trouble swallowing  Respiratory: Positive for cough  Negative for chest tightness, shortness of breath and wheezing  Gastrointestinal: Negative for nausea and vomiting  Skin: Negative for rash  Psychiatric/Behavioral: Positive for sleep disturbance  Negative for confusion  Video Exam    There were no vitals filed for this visit  Physical Exam  Constitutional:       General: She is not in acute distress  Pulmonary:      Effort: Pulmonary effort is normal  No respiratory distress  Neurological:      Mental Status: She is alert and oriented to person, place, and time  Psychiatric:         Thought Content:  Thought content normal          Judgment: Judgment normal           I spent 18 minutes directly with the patient during this visit

## 2023-02-17 NOTE — ASSESSMENT & PLAN NOTE
Conservative management with over-the-counter medication along with Claritin-D, Flonase and Tessalon Perles  Increase oral hydration  Oriented to go to the ER or urgent care if symptoms does not improve in the next 2 to 3 days

## 2023-02-21 DIAGNOSIS — E11.9 TYPE 2 DIABETES MELLITUS WITHOUT COMPLICATION, WITHOUT LONG-TERM CURRENT USE OF INSULIN (HCC): Primary | ICD-10-CM

## 2023-02-21 DIAGNOSIS — E11.9 TYPE 2 DIABETES MELLITUS WITHOUT COMPLICATION, WITHOUT LONG-TERM CURRENT USE OF INSULIN (HCC): ICD-10-CM

## 2023-02-21 RX ORDER — ATORVASTATIN CALCIUM 40 MG/1
TABLET, FILM COATED ORAL
Qty: 30 TABLET | Refills: 0 | Status: SHIPPED | OUTPATIENT
Start: 2023-02-21

## 2023-03-24 DIAGNOSIS — E11.9 TYPE 2 DIABETES MELLITUS WITHOUT COMPLICATION, WITHOUT LONG-TERM CURRENT USE OF INSULIN (HCC): ICD-10-CM

## 2023-03-27 RX ORDER — ATORVASTATIN CALCIUM 40 MG/1
TABLET, FILM COATED ORAL
Qty: 30 TABLET | Refills: 0 | Status: SHIPPED | OUTPATIENT
Start: 2023-03-27

## 2023-04-04 DIAGNOSIS — I10 ESSENTIAL HYPERTENSION: ICD-10-CM

## 2023-04-07 RX ORDER — AMLODIPINE BESYLATE AND BENAZEPRIL HYDROCHLORIDE 5; 40 MG/1; MG/1
CAPSULE ORAL
Qty: 90 CAPSULE | Refills: 0 | Status: SHIPPED | OUTPATIENT
Start: 2023-04-07

## 2023-04-23 DIAGNOSIS — E11.9 TYPE 2 DIABETES MELLITUS WITHOUT COMPLICATION, WITHOUT LONG-TERM CURRENT USE OF INSULIN (HCC): ICD-10-CM

## 2023-04-24 RX ORDER — ATORVASTATIN CALCIUM 40 MG/1
TABLET, FILM COATED ORAL
Qty: 30 TABLET | Refills: 0 | Status: SHIPPED | OUTPATIENT
Start: 2023-04-24

## 2023-05-23 DIAGNOSIS — E11.9 TYPE 2 DIABETES MELLITUS WITHOUT COMPLICATION, WITHOUT LONG-TERM CURRENT USE OF INSULIN (HCC): Primary | ICD-10-CM

## 2023-06-30 DIAGNOSIS — M79.89 LEG SWELLING: ICD-10-CM

## 2023-07-03 RX ORDER — FUROSEMIDE 20 MG/1
TABLET ORAL
Qty: 90 TABLET | Refills: 0 | Status: SHIPPED | OUTPATIENT
Start: 2023-07-03

## 2023-07-12 DIAGNOSIS — E11.9 TYPE 2 DIABETES MELLITUS WITHOUT COMPLICATION, WITHOUT LONG-TERM CURRENT USE OF INSULIN (HCC): ICD-10-CM

## 2023-07-12 DIAGNOSIS — I10 ESSENTIAL HYPERTENSION: ICD-10-CM

## 2023-07-13 RX ORDER — ATORVASTATIN CALCIUM 40 MG/1
40 TABLET, FILM COATED ORAL DAILY
Qty: 90 TABLET | Refills: 1 | Status: SHIPPED | OUTPATIENT
Start: 2023-07-13

## 2023-07-13 RX ORDER — AMLODIPINE AND BENAZEPRIL HYDROCHLORIDE 5; 40 MG/1; MG/1
1 CAPSULE ORAL DAILY
Qty: 90 CAPSULE | Refills: 1 | Status: SHIPPED | OUTPATIENT
Start: 2023-07-13

## 2023-07-14 DIAGNOSIS — E11.9 TYPE 2 DIABETES MELLITUS WITHOUT COMPLICATION, WITHOUT LONG-TERM CURRENT USE OF INSULIN (HCC): ICD-10-CM

## 2023-07-14 RX ORDER — METFORMIN HYDROCHLORIDE 500 MG/1
500 TABLET, EXTENDED RELEASE ORAL 2 TIMES DAILY WITH MEALS
Qty: 60 TABLET | Refills: 0 | Status: SHIPPED | OUTPATIENT
Start: 2023-07-14 | End: 2023-08-13

## 2023-08-02 ENCOUNTER — TELEPHONE (OUTPATIENT)
Dept: INTERNAL MEDICINE CLINIC | Facility: CLINIC | Age: 63
End: 2023-08-02

## 2023-08-02 DIAGNOSIS — E11.9 TYPE 2 DIABETES MELLITUS WITHOUT COMPLICATION, WITHOUT LONG-TERM CURRENT USE OF INSULIN (HCC): Primary | ICD-10-CM

## 2023-08-02 RX ORDER — METFORMIN HYDROCHLORIDE 500 MG/1
1000 TABLET, EXTENDED RELEASE ORAL 2 TIMES DAILY WITH MEALS
Qty: 360 TABLET | Refills: 0 | Status: SHIPPED | OUTPATIENT
Start: 2023-08-02 | End: 2023-10-31

## 2023-08-02 NOTE — TELEPHONE ENCOUNTER
Hi, this is Narcisa Smith. My number is [phone]. My date of birth is [birth year]. I had my metformin refilled like 2 weeks ago and I just realized that the prescription amount is wrong and it was taking two tablets by mouth twice a day with meals. But this prescription is only saying 1 tablet by mouth twice a day with meal. So they didn't give me enough so I don't know why it's different. If it was, if they have it wrong, If it was sent in an error wrong, I don't. I'm not sure If somebody could please call me back [phone]. Thank you.

## 2023-11-20 DIAGNOSIS — E11.9 TYPE 2 DIABETES MELLITUS WITHOUT COMPLICATION, WITHOUT LONG-TERM CURRENT USE OF INSULIN (HCC): ICD-10-CM

## 2023-11-20 RX ORDER — ATORVASTATIN CALCIUM 40 MG/1
40 TABLET, FILM COATED ORAL DAILY
Qty: 90 TABLET | Refills: 1 | Status: SHIPPED | OUTPATIENT
Start: 2023-11-20

## 2023-12-12 DIAGNOSIS — E11.9 TYPE 2 DIABETES MELLITUS WITHOUT COMPLICATION, WITHOUT LONG-TERM CURRENT USE OF INSULIN (HCC): ICD-10-CM

## 2023-12-12 DIAGNOSIS — I10 ESSENTIAL HYPERTENSION: ICD-10-CM

## 2023-12-12 RX ORDER — METFORMIN HYDROCHLORIDE 500 MG/1
TABLET, EXTENDED RELEASE ORAL
Qty: 360 TABLET | Refills: 0 | Status: SHIPPED | OUTPATIENT
Start: 2023-12-12

## 2023-12-12 RX ORDER — AMLODIPINE AND BENAZEPRIL HYDROCHLORIDE 5; 40 MG/1; MG/1
1 CAPSULE ORAL DAILY
Qty: 90 CAPSULE | Refills: 1 | Status: SHIPPED | OUTPATIENT
Start: 2023-12-12

## 2024-04-29 DIAGNOSIS — E11.9 TYPE 2 DIABETES MELLITUS WITHOUT COMPLICATION, WITHOUT LONG-TERM CURRENT USE OF INSULIN (HCC): ICD-10-CM

## 2024-04-30 RX ORDER — METFORMIN HYDROCHLORIDE 500 MG/1
1000 TABLET, EXTENDED RELEASE ORAL 2 TIMES DAILY WITH MEALS
Qty: 120 TABLET | Refills: 0 | Status: SHIPPED | OUTPATIENT
Start: 2024-04-30

## 2024-05-16 ENCOUNTER — OFFICE VISIT (OUTPATIENT)
Dept: INTERNAL MEDICINE CLINIC | Facility: CLINIC | Age: 64
End: 2024-05-16
Payer: COMMERCIAL

## 2024-05-16 VITALS
BODY MASS INDEX: 51.21 KG/M2 | DIASTOLIC BLOOD PRESSURE: 68 MMHG | TEMPERATURE: 98.6 F | HEART RATE: 78 BPM | RESPIRATION RATE: 16 BRPM | WEIGHT: 289 LBS | SYSTOLIC BLOOD PRESSURE: 138 MMHG | HEIGHT: 63 IN | OXYGEN SATURATION: 95 %

## 2024-05-16 DIAGNOSIS — F32.0 CURRENT MILD EPISODE OF MAJOR DEPRESSIVE DISORDER WITHOUT PRIOR EPISODE (HCC): ICD-10-CM

## 2024-05-16 DIAGNOSIS — E55.9 HYPOVITAMINOSIS D: ICD-10-CM

## 2024-05-16 DIAGNOSIS — Z12.4 ENCOUNTER FOR SCREENING FOR CERVICAL CANCER: ICD-10-CM

## 2024-05-16 DIAGNOSIS — I10 ESSENTIAL HYPERTENSION: ICD-10-CM

## 2024-05-16 DIAGNOSIS — E11.9 TYPE 2 DIABETES MELLITUS WITHOUT COMPLICATION, WITHOUT LONG-TERM CURRENT USE OF INSULIN (HCC): ICD-10-CM

## 2024-05-16 DIAGNOSIS — R25.2 MUSCLE CRAMP: ICD-10-CM

## 2024-05-16 DIAGNOSIS — Z12.11 SCREENING FOR COLON CANCER: ICD-10-CM

## 2024-05-16 DIAGNOSIS — Z12.31 ENCOUNTER FOR SCREENING MAMMOGRAM FOR MALIGNANT NEOPLASM OF BREAST: ICD-10-CM

## 2024-05-16 DIAGNOSIS — Z12.2 SCREENING FOR LUNG CANCER: ICD-10-CM

## 2024-05-16 DIAGNOSIS — Z00.00 ANNUAL PHYSICAL EXAM: Primary | ICD-10-CM

## 2024-05-16 DIAGNOSIS — E66.01 MORBID OBESITY (HCC): ICD-10-CM

## 2024-05-16 PROCEDURE — 99214 OFFICE O/P EST MOD 30 MIN: CPT

## 2024-05-16 PROCEDURE — 83036 HEMOGLOBIN GLYCOSYLATED A1C: CPT

## 2024-05-16 PROCEDURE — 99396 PREV VISIT EST AGE 40-64: CPT

## 2024-05-16 RX ORDER — ATORVASTATIN CALCIUM 40 MG/1
40 TABLET, FILM COATED ORAL DAILY
Qty: 90 TABLET | Refills: 1 | Status: SHIPPED | OUTPATIENT
Start: 2024-05-16

## 2024-05-16 RX ORDER — AMLODIPINE AND BENAZEPRIL HYDROCHLORIDE 5; 40 MG/1; MG/1
1 CAPSULE ORAL DAILY
Qty: 90 CAPSULE | Refills: 1 | Status: SHIPPED | OUTPATIENT
Start: 2024-05-16

## 2024-05-16 RX ORDER — DULOXETIN HYDROCHLORIDE 20 MG/1
20 CAPSULE, DELAYED RELEASE ORAL DAILY
Qty: 30 CAPSULE | Refills: 1 | Status: SHIPPED | OUTPATIENT
Start: 2024-05-16 | End: 2024-07-15

## 2024-05-16 NOTE — ASSESSMENT & PLAN NOTE
Current medication Lotrel 5-40 daily  Blood pressure slightly above goal this visit but acceptable 138/68  Continue to monitor blood pressure and keep a log  Low-salt diet

## 2024-05-16 NOTE — ASSESSMENT & PLAN NOTE
Patient acknowledges having a poor diet which is worsened by home stressors and current depression  Previously participated in weight loss management but could properly implement requirements  Patient is a candidate for GLP-1-discussed medication mechanism of action, side effects, contraindication.  No family history of thyroid cancer.  Patient is agreeable to possibly starting  Will review blood work and possibly initiate afterwards  Reinforced healthy diet low-carb, low in saturated fats whole-grain  Encourage patient to exercise formally weekly-at least even starting with some walking

## 2024-05-16 NOTE — ASSESSMENT & PLAN NOTE
PHQ-9-score of 10, no depression  Patient is not suicidal.  Feels very depressed which is precipitated by stressors at home  Ideally patient will benefit from  psychotherapy as first-line however due to very long wait list time Will start low-dose Cymbalta 20 mg daily escalate as needed  Referral to psych/psychotherapy  Return in 5 weeks for reevaluation

## 2024-05-16 NOTE — ASSESSMENT & PLAN NOTE
Complains of muscle cramps in the arms and legs which is worse at night.  Denies restless leg  Patient intermittently taking Lasix and potassium  Denies vomiting or diarrhea, no back pain or chest pain  Will get a BMP to check potassium, check magnesium  Examination of the feet with some mild neuropathy  Patient recommended to stop Lasix and potassium

## 2024-05-16 NOTE — ASSESSMENT & PLAN NOTE
Lab Results   Component Value Date    HGBA1C 8.1 (H) 08/20/2021   A1c slightly improved -POCT A1c 7.3  Current medication metformin 1000 mg twice daily  Patient not adequately following low carbohydrate diet,   No regular sugar checks  No hypoglycemic episodes  Regular ophthalmology follow-up  Endorses cramps in the fourth    Plan  A1c improved but not at goal  Continue metformin 1000 mg twice daily  Consideration for GLP-1 which also help with weight loss  Diabetes nutrition education reinforced  Continue statin, ACEi  Follow-up microalbuminuria

## 2024-05-16 NOTE — PROGRESS NOTES
Adult Annual Physical  Name: Ana Gonzalez      : 1960      MRN: [id number]  Encounter Provider: Lydia Joy MD  Encounter Date: 2024   Encounter department: [facility] [place]    Assessment & Plan   1. Annual physical exam  -     CBC and differential; Future  -     Basic metabolic panel; Future  -     Lipid Panel with Direct LDL reflex; Future  -     HIV 1/2 AG/AB w Reflex [facility] for 2 yr old and above; Future  -     Hepatitis C antibody; Future  -     Magnesium; Future  -     Ambulatory Referral to Weight Management; Future  -     POCT hemoglobin A1c  -     TSH, 3rd generation with Free T4 reflex; Future  2. Screening for colon cancer  -     Ambulatory Referral to Gastroenterology; Future  3. Encounter for screening mammogram for malignant neoplasm of breast  -     Mammo screening bilateral w 3d & cad; Future  4. Encounter for screening for cervical cancer  -     Ambulatory Referral to Obstetrics / Gynecology; Future  5. Hypovitaminosis D  -     Vitamin D 25 hydroxy; Future  6. Type 2 diabetes mellitus without complication, without long-term current use of insulin (HCC)  Assessment & Plan:    Lab Results   Component Value Date    HGBA1C 8.1 (H) 2021   A1c slightly improved -POCT A1c 7.3  Current medication metformin 1000 mg twice daily  Patient not adequately following low carbohydrate diet,   No regular sugar checks  No hypoglycemic episodes  Regular ophthalmology follow-up  Endorses cramps in the fourth    Plan  A1c improved but not at goal  Continue metformin 1000 mg twice daily  Consideration for GLP-1 which also help with weight loss  Diabetes nutrition education reinforced  Continue statin, ACEi  Follow-up microalbuminuria      Orders:  -     Albumin / creatinine urine ratio; Future  -     Albumin / creatinine urine ratio; Future  -     POCT hemoglobin A1c  -     atorvastatin (LIPITOR) 40 mg tablet; Take 1 tablet (40 mg total) by mouth daily  7.  Current mild episode of major depressive disorder without prior episode (HCC)  Assessment & Plan:  PHQ-9-score of 10, no depression  Patient is not suicidal.  Feels very depressed which is precipitated by stressors at home  Ideally patient will benefit from  psychotherapy as first-line however due to very long wait list time Will start low-dose Cymbalta 20 mg daily escalate as needed  Referral to psych/psychotherapy  Return in 5 weeks for reevaluation  Orders:  -     TSH, 3rd generation with Free T4 reflex; Future  -     DULoxetine (CYMBALTA) 20 mg capsule; Take 1 capsule (20 mg total) by mouth daily  8. Essential hypertension  Assessment & Plan:  Current medication Lotrel 5-40 daily  Blood pressure slightly above goal this visit but acceptable 138/68  Continue to monitor blood pressure and keep a log  Low-salt diet  Orders:  -     amLODIPine-benazepril (LOTREL) 5-40 MG per capsule; Take 1 capsule by mouth daily  9. Muscle cramps  10. Screening for lung cancer  -     CT lung screening program; Future; Expected date: 05/16/2024  11. Morbid obesity (HCC)  Assessment & Plan:  Patient acknowledges having a poor diet which is worsened by home stressors and current depression  Previously participated in weight loss management but could not keep through with the requirements  Patient candidate for GLP-1-discussed medication mechanism side effects contraindication.  Patient is agreeable to possibly starting  Will review blood work and possibly initiate afterwards  Reinforced healthy diet low-carb, low in saturated fats whole-grain  Encourage patient to exercise formally weekly-at least even starting with some walking    Immunizations and preventive care screenings were discussed with patient today. Appropriate education was printed on patient's after visit summary.    Counseling:  Alcohol/drug use: discussed moderation in alcohol intake, the recommendations for healthy alcohol use, and avoidance of illicit drug use.  Dental  Health: discussed importance of regular tooth brushing, flossing, and dental visits.  Injury prevention: discussed safety/seat belts, safety helmets, smoke detectors, carbon dioxide detectors, and smoking near bedding or upholstery.  Exercise: the importance of regular exercise/physical activity was discussed. Recommend exercise 3-5 times per week for at least 30 minutes.       Depression Screening and Follow-up Plan: Patient's depression screening was positive with a PHQ-9 score of 10. Patient assessed for underlying major depression. Brief counseling provided and recommend additional follow-up/re-evaluation next office visit.     Lung Cancer Screening Shared Decision Making: I discussed with her that she is a candidate for lung cancer CT screening.     The following Shared Decision-Making points were covered:  Benefits of screening were discussed, including the rates of reduction in death from lung cancer and other causes.  Harms of screening were reviewed, including false positive tests, radiation exposure levels, risks of invasive procedures, risks of complications of screening, and risk of overdiagnosis.  I counseled on the importance of adherence to annual lung cancer LDCT screening, impact of co-morbidities, and ability or willingness to undergo diagnosis and treatment.  I counseled on the importance of maintaining abstinence as a former smoker or was counseled on the importance of smoking cessation if a current smoker    Review of Eligibility Criteria: She meets all of the criteria for Lung Cancer Screening.   - She is 64 y.o.   - She has 20 pack year tobacco history and is a current smoker or has quit within the past 15 years  - She presents no signs or symptoms of lung cancer    After discussion, the patient decided to elect lung cancer screening.        History of Present Illness     Adult Annual Physical:  Patient presents for annual physical.     Diet and Physical Activity:  - Diet/Nutrition: portion  control, diabetic diet, frequent junk food and adequate fiber intake.  - Exercise: no formal exercise.    Depression Screening:    - PHQ-9 Score: 10    General Health:  - Sleep: 4-6 hours of sleep on average.  - Hearing: normal hearing right ear.  - Vision: wears glasses.  - Dental: regular dental visits.    /GYN Health:  - Follows with GYN: no.   - Menopause: postmenopausal.   - History of STDs: no  - Contraception: menopause.      Advanced Care Planning:  - Has an advanced directive?: no    - Has a durable medical POA?: no    - ACP document given to patient?: no      Review of Systems  Pertinent Medical History       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    Rash 10/5/2022    Sleep apnea, obstructive      Past Surgical History:   Procedure Laterality Date    BREAST BIOPSY Right 2016    Done at [facility]    WISDOM TOOTH EXTRACTION       Family History   Problem Relation Age of Onset    Heart disease Mother     Diabetes Mother     Liver cancer Mother     Kidney cancer Mother     Cancer Mother     Heart disease Father     Colon cancer Father     Skin cancer Father     Stroke Brother      Current Outpatient Medications on File Prior to Visit   Medication Sig Dispense Refill    cholecalciferol (VITAMIN D3) 1,000 units tablet Take 1,000 Units by mouth daily      Melatonin 10 MG CAPS Take 10 mg by mouth daily at bedtime as needed      metFORMIN (GLUCOPHAGE-XR) 500 mg 24 hr tablet TAKE 2 TABLETS BY MOUTH TWICE DAILY WITH MEALS 120 tablet 0    Multiple Vitamin (multivitamin) tablet Take 1 tablet by mouth daily      [DISCONTINUED] amLODIPine-benazepril (LOTREL) 5-40 MG per capsule TAKE 1 CAPSULE BY MOUTH EVERY DAY 90 capsule 1    [DISCONTINUED] atorvastatin (LIPITOR) 40 mg tablet Take 1  tablet (40 mg total) by mouth daily 90 tablet 1    [DISCONTINUED] furosemide (LASIX) 20 mg tablet take 1 tablet by mouth once daily if needed for SWELLING 90 tablet 0    [DISCONTINUED] potassium chloride (K-DUR,KLOR-CON) 20 mEq tablet Take 1 tablet (20 mEq total) by mouth daily 30 tablet 5    benzonatate (TESSALON PERLES) 100 mg capsule Take 1 capsule (100 mg total) by mouth 3 (three) times a day as needed for cough (Patient not taking: Reported on 5/16/2024) 20 capsule 0    fluticasone (FLONASE) 50 mcg/act nasal spray 2 sprays into each nostril daily for 7 days 9.9 mL 0    fluticasone (FLOVENT HFA) 110 MCG/ACT inhaler Inhale 4 puffs 2 (two) times a day for 14 days Rinse mouth after use. 12 g 0    hydrocortisone 2.5 % cream Apply topically 2 (two) times a day for 7 days 28 g 1    loratadine-pseudoephedrine (CLARITIN-D 12-HOUR) 5-120 mg per tablet Take 1 tablet by mouth 2 (two) times a day (Patient not taking: Reported on 5/16/2024) 20 tablet 0     No current facility-administered medications on file prior to visit.     Allergies   Allergen Reactions    Azithromycin Itching and Rash      Current Outpatient Medications on File Prior to Visit   Medication Sig Dispense Refill    cholecalciferol (VITAMIN D3) 1,000 units tablet Take 1,000 Units by mouth daily      Melatonin 10 MG CAPS Take 10 mg by mouth daily at bedtime as needed      metFORMIN (GLUCOPHAGE-XR) 500 mg 24 hr tablet TAKE 2 TABLETS BY MOUTH TWICE DAILY WITH MEALS 120 tablet 0    Multiple Vitamin (multivitamin) tablet Take 1 tablet by mouth daily      [DISCONTINUED] amLODIPine-benazepril (LOTREL) 5-40 MG per capsule TAKE 1 CAPSULE BY MOUTH EVERY DAY 90 capsule 1    [DISCONTINUED] atorvastatin (LIPITOR) 40 mg tablet Take 1 tablet (40 mg total) by mouth daily 90 tablet 1    [DISCONTINUED] furosemide (LASIX) 20 mg tablet take 1 tablet by mouth once daily if needed for SWELLING 90 tablet 0    [DISCONTINUED] potassium chloride (K-DUR,KLOR-CON) 20 mEq tablet Take 1  "tablet (20 mEq total) by mouth daily 30 tablet 5    benzonatate (TESSALON PERLES) 100 mg capsule Take 1 capsule (100 mg total) by mouth 3 (three) times a day as needed for cough (Patient not taking: Reported on 5/16/2024) 20 capsule 0    fluticasone (FLONASE) 50 mcg/act nasal spray 2 sprays into each nostril daily for 7 days 9.9 mL 0    fluticasone (FLOVENT HFA) 110 MCG/ACT inhaler Inhale 4 puffs 2 (two) times a day for 14 days Rinse mouth after use. 12 g 0    hydrocortisone 2.5 % cream Apply topically 2 (two) times a day for 7 days 28 g 1    loratadine-pseudoephedrine (CLARITIN-D 12-HOUR) 5-120 mg per tablet Take 1 tablet by mouth 2 (two) times a day (Patient not taking: Reported on 5/16/2024) 20 tablet 0     No current facility-administered medications on file prior to visit.      Social History     Tobacco Use    Smoking status: Former     Current packs/day: 0.00     Average packs/day: 1 pack/day for 30.0 years (30.0 ttl pk-yrs)     Types: Cigarettes     Start date: 1979     Quit date: 2009     Years since quitting: 15.3    Smokeless tobacco: Never   Vaping Use    Vaping status: Never Used   Substance and Sexual Activity    Alcohol use: Not Currently     Alcohol/week: 0.0 standard drinks of alcohol    Drug use: Not Currently     Types: Marijuana     Comment: Has not used in a long time(edible)    Sexual activity: Not Currently     Partners: Male       Objective     /68 (BP Location: Right arm, Patient Position: Sitting, Cuff Size: Extra-Large)   Pulse 78   Temp 98.6 °F (37 °C) (Tympanic)   Resp 16   Ht 5' 3\" (1.6 m)   Wt 131 kg (289 lb)   SpO2 95%   BMI 51.19 kg/m²     Physical Exam  Vitals and nursing note reviewed.   Constitutional:       General: She is not in acute distress.     Appearance: She is well-developed. She is obese. She is not ill-appearing.   HENT:      Head: Normocephalic and atraumatic.   Eyes:      Conjunctiva/sclera: Conjunctivae normal.   Cardiovascular:      Rate and Rhythm: " Normal rate and regular rhythm.      Pulses: no weak pulses.           Dorsalis pedis pulses are 1+ on the right side and 1+ on the left side.        Posterior tibial pulses are 1+ on the right side and 1+ on the left side.      Heart sounds: No murmur heard.  Pulmonary:      Effort: Pulmonary effort is normal. No respiratory distress.      Breath sounds: Normal breath sounds.   Abdominal:      Palpations: Abdomen is soft.      Tenderness: There is no abdominal tenderness.   Musculoskeletal:         General: No swelling.      Cervical back: Neck supple.   Feet:      Right foot:      Skin integrity: No ulcer, skin breakdown, erythema, warmth, callus or dry skin.      Left foot:      Skin integrity: No ulcer, skin breakdown, erythema, warmth, callus or dry skin.   Skin:     General: Skin is warm and dry.      Capillary Refill: Capillary refill takes less than 2 seconds.   Neurological:      Mental Status: She is alert.   Psychiatric:         Mood and Affect: Mood normal.       Diabetic Foot Exam    Patient's shoes and socks removed.    Right Foot/Ankle   Right Foot Inspection  Skin Exam: skin normal and skin intact. No dry skin, no warmth, no callus, no erythema, no maceration, no abnormal color, no pre-ulcer, no ulcer and no callus.     Toe Exam: ROM and strength within normal limits.     Sensory   Vibration: intact  Proprioception: intact  Monofilament testing: diminished    Vascular  Capillary refills: < 3 seconds  The right DP pulse is 1+. The right PT pulse is 1+.     Left Foot/Ankle  Left Foot Inspection  Skin Exam: skin normal and skin intact. No dry skin, no warmth, no erythema, no maceration, normal color, no pre-ulcer, no ulcer and no callus.     Toe Exam: ROM and strength within normal limits.     Sensory   Monofilament testing: diminished    Vascular  Capillary refills: < 3 seconds  The left DP pulse is 1+. The left PT pulse is 1+.     Assign Risk Category  No deformity present  Loss of protective  sensation  No weak pulses  Risk: 1

## 2024-05-16 NOTE — PROGRESS NOTES
Ambulatory Visit  Name: Ana Gonzalez      : 1960      MRN: [id number]  Encounter Provider: Lydia Joy MD  Encounter Date: 2024   Encounter department: [facility] INTERNAL MEDICINE [place]    Assessment & Plan   1. Annual physical exam  -     CBC and differential; Future  -     Basic metabolic panel; Future  -     Lipid Panel with Direct LDL reflex; Future  -     HIV 1/2 AG/AB w Reflex [facility] for 2 yr old and above; Future  -     Hepatitis C antibody; Future  -     Magnesium; Future  -     Ambulatory Referral to Weight Management; Future  -     POCT hemoglobin A1c  -     TSH, 3rd generation with Free T4 reflex; Future  2. Screening for colon cancer  -     Ambulatory Referral to Gastroenterology; Future  3. Encounter for screening mammogram for malignant neoplasm of breast  -     Mammo screening bilateral w 3d & cad; Future  4. Encounter for screening for cervical cancer  -     Ambulatory Referral to Obstetrics / Gynecology; Future  5. Hypovitaminosis D  -     Vitamin D 25 hydroxy; Future  6. Type 2 diabetes mellitus without complication, without long-term current use of insulin (HCC)  Assessment & Plan:    Lab Results   Component Value Date    HGBA1C 8.1 (H) 2021   A1c slightly improved -POCT A1c 7.3  Current medication metformin 1000 mg twice daily  Patient not adequately following low carbohydrate diet,   No regular sugar checks  No hypoglycemic episodes  Regular ophthalmology follow-up  Endorses cramps in the fourth    Plan  A1c improved but not at goal  Continue metformin 1000 mg twice daily  Consideration for GLP-1 which also help with weight loss  Diabetes nutrition education reinforced  Continue statin, ACEi  Follow-up microalbuminuria      Orders:  -     Albumin / creatinine urine ratio; Future  -     Albumin / creatinine urine ratio; Future  -     POCT hemoglobin A1c  -     atorvastatin (LIPITOR) 40 mg tablet; Take 1 tablet (40 mg total) by mouth daily  7. Current  mild episode of major depressive disorder without prior episode (HCC)  Assessment & Plan:  PHQ-9-score of 10, no depression  Patient is not suicidal.  Feels very depressed which is precipitated by stressors at home  Ideally patient will benefit from  psychotherapy as first-line however due to very long wait list time Will start low-dose Cymbalta 20 mg daily escalate as needed  Referral to psych/psychotherapy  Return in 5 weeks for reevaluation  Orders:  -     TSH, 3rd generation with Free T4 reflex; Future  -     DULoxetine (CYMBALTA) 20 mg capsule; Take 1 capsule (20 mg total) by mouth daily  8. Essential hypertension  Assessment & Plan:  Current medication Lotrel 5-40 daily  Blood pressure slightly above goal this visit but acceptable 138/68  Continue to monitor blood pressure and keep a log  Low-salt diet  Orders:  -     amLODIPine-benazepril (LOTREL) 5-40 MG per capsule; Take 1 capsule by mouth daily  9. Muscle cramp  Assessment & Plan:  Complains of muscle cramps in the arms and legs which is worse at night.  Denies restless leg  Patient intermittently taking Lasix and potassium  Denies vomiting or diarrhea, no back pain or chest pain  Will get a BMP to check potassium, check magnesium  Examination of the feet with some mild neuropathy  Patient recommended to stop Lasix and potassium  Orders:  -     Vitamin B12; Future  10. Screening for lung cancer  -     CT lung screening program; Future; Expected date: 05/16/2024  11. Morbid obesity (HCC)  Assessment & Plan:  Patient acknowledges having a poor diet which is worsened by home stressors and current depression  Previously participated in weight loss management but could properly implement requirements  Patient is a candidate for GLP-1-discussed medication mechanism of action, side effects, contraindication.  No family history of thyroid cancer.  Patient is agreeable to possibly starting  Will review blood work and possibly initiate afterwards  Reinforced healthy  diet low-carb, low in saturated fats whole-grain  Encourage patient to exercise formally weekly-at least even starting with some walking      Depression Screening and Follow-up Plan: Patient's depression screening was positive with a PHQ-9 score of 10.         History of Present Illness    Ana Hernández is a pleasant 64-year-old with past medical history of type 2 diabetes, hyperlipidemia, hypertension, ANTONETTE who presented today for annual physical.  Patient admits to depressive symptoms which are precipitated by stressors at home.  Patient has a disabled son who is wheelchair-bound secondary to progressive MS, also has a disabled .  Patient works full-time and caters for both of them.  She acknowledges no time to take care of herself and oftentimes overburdened.  Her mood is low most of the days, low concentration, more appetite however unable to eat healthy.  Denies suicidal ideation or intent.  Patient is opened to getting help in terms of psychotherapy and all medication.   Patient also reports having muscle cramps in the hands and legs which is worse at night.  Felt could be relation with the Lasix and potassium.  Denies restless leg  Patient is also worried about her weight-she is unable to eat healthy and exercise, which is made worse in the setting of depression.  Patient feels she needs to make a change, she needs to lose weight to feel better.  Has previously been on a weight loss management program but was unable to apply or implement, and prescription no benefit.  We discussed GLP 1 agonist which could help with both diabetes and weight loss.  Reviewed medication contraindications mechanism of action and side effects.  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    Rash 10/5/2022    Sleep apnea, obstructive      Past Surgical History:   Procedure Laterality Date    BREAST BIOPSY Right 2016    Done at [facility]    WISDOM TOOTH EXTRACTION       Family History   Problem Relation Age of Onset    Heart disease Mother     Diabetes Mother     Liver cancer Mother     Kidney cancer Mother     Cancer Mother     Heart disease Father     Colon cancer Father     Skin cancer Father     Stroke Brother      Social History     Tobacco Use    Smoking status: Former     Current packs/day: 0.00     Average packs/day: 1 pack/day for 30.0 years (30.0 ttl pk-yrs)     Types: Cigarettes     Start date: 1979     Quit date: 2009     Years since quitting: 15.3    Smokeless tobacco: Never   Vaping Use    Vaping status: Never Used   Substance and Sexual Activity    Alcohol use: Not Currently     Alcohol/week: 0.0 standard drinks of alcohol    Drug use: Not Currently     Types: Marijuana     Comment: Has not used in a long time(edible)    Sexual activity: Not Currently     Partners: Male     Current Outpatient Medications on File Prior to Visit   Medication Sig    cholecalciferol (VITAMIN D3) 1,000 units tablet Take 1,000 Units by mouth daily    Melatonin 10 MG CAPS Take 10 mg by mouth daily at bedtime as needed    metFORMIN (GLUCOPHAGE-XR) 500 mg 24 hr tablet TAKE 2 TABLETS BY MOUTH TWICE DAILY WITH MEALS    Multiple Vitamin (multivitamin) tablet Take 1 tablet by mouth daily    [DISCONTINUED] amLODIPine-benazepril (LOTREL) 5-40 MG per capsule TAKE 1 CAPSULE BY MOUTH EVERY DAY    [DISCONTINUED] atorvastatin (LIPITOR) 40 mg tablet Take 1 tablet (40 mg total) by mouth daily    [DISCONTINUED] furosemide (LASIX) 20 mg tablet take 1 tablet by mouth once daily if needed for SWELLING    [DISCONTINUED] potassium chloride  "(K-DUR,KLOR-CON) 20 mEq tablet Take 1 tablet (20 mEq total) by mouth daily    benzonatate (TESSALON PERLES) 100 mg capsule Take 1 capsule (100 mg total) by mouth 3 (three) times a day as needed for cough (Patient not taking: Reported on 5/16/2024)    fluticasone (FLONASE) 50 mcg/act nasal spray 2 sprays into each nostril daily for 7 days    fluticasone (FLOVENT HFA) 110 MCG/ACT inhaler Inhale 4 puffs 2 (two) times a day for 14 days Rinse mouth after use.    hydrocortisone 2.5 % cream Apply topically 2 (two) times a day for 7 days    loratadine-pseudoephedrine (CLARITIN-D 12-HOUR) 5-120 mg per tablet Take 1 tablet by mouth 2 (two) times a day (Patient not taking: Reported on 5/16/2024)     Allergies   Allergen Reactions    Azithromycin Itching and Rash     Immunization History   Administered Date(s) Administered    COVID-19 PFIZER VACCINE 0.3 ML IM 03/27/2021, 04/17/2021, 12/11/2021    COVID-19 Pfizer vac (Frank-sucrose, gray cap) 12 yr+ IM 05/18/2022    Influenza, recombinant, quadrivalent,injectable, preservative free 10/15/2021    Pneumococcal Polysaccharide PPV23 02/22/2021    Tdap 09/14/2021     Objective     /68 (BP Location: Right arm, Patient Position: Sitting, Cuff Size: Extra-Large)   Pulse 78   Temp 98.6 °F (37 °C) (Tympanic)   Resp 16   Ht 5' 3\" (1.6 m)   Wt 131 kg (289 lb)   SpO2 95%   BMI 51.19 kg/m²     Physical Exam  Vitals and nursing note reviewed.   Constitutional:       General: She is not in acute distress.     Appearance: She is well-developed. She is obese.   HENT:      Head: Normocephalic and atraumatic.   Eyes:      Conjunctiva/sclera: Conjunctivae normal.   Cardiovascular:      Rate and Rhythm: Normal rate and regular rhythm.      Heart sounds: No murmur heard.  Pulmonary:      Effort: Pulmonary effort is normal. No respiratory distress.      Breath sounds: Normal breath sounds.   Abdominal:      Palpations: Abdomen is soft.      Tenderness: There is no abdominal tenderness. "   Musculoskeletal:         General: No swelling.      Cervical back: Neck supple.   Skin:     General: Skin is warm and dry.      Capillary Refill: Capillary refill takes less than 2 seconds.   Neurological:      Mental Status: She is alert.   Psychiatric:         Mood and Affect: Mood normal.

## 2024-05-17 LAB — SL AMB POCT HEMOGLOBIN AIC: 7.3 (ref ?–6.5)

## 2024-05-22 ENCOUNTER — APPOINTMENT (OUTPATIENT)
Dept: LAB | Facility: CLINIC | Age: 64
End: 2024-05-22
Payer: COMMERCIAL

## 2024-05-22 DIAGNOSIS — E55.9 HYPOVITAMINOSIS D: ICD-10-CM

## 2024-05-22 DIAGNOSIS — R25.2 MUSCLE CRAMP: ICD-10-CM

## 2024-05-22 DIAGNOSIS — E11.9 TYPE 2 DIABETES MELLITUS WITHOUT COMPLICATION, WITHOUT LONG-TERM CURRENT USE OF INSULIN (HCC): ICD-10-CM

## 2024-05-22 DIAGNOSIS — F32.0 CURRENT MILD EPISODE OF MAJOR DEPRESSIVE DISORDER WITHOUT PRIOR EPISODE (HCC): ICD-10-CM

## 2024-05-22 DIAGNOSIS — Z00.00 ANNUAL PHYSICAL EXAM: ICD-10-CM

## 2024-05-22 LAB
25(OH)D3 SERPL-MCNC: 28.8 NG/ML (ref 30–100)
ANION GAP SERPL CALCULATED.3IONS-SCNC: 8 MMOL/L (ref 4–13)
BASOPHILS # BLD AUTO: 0.06 THOUSANDS/ÂΜL (ref 0–0.1)
BASOPHILS NFR BLD AUTO: 1 % (ref 0–1)
BUN SERPL-MCNC: 16 MG/DL (ref 5–25)
CALCIUM SERPL-MCNC: 9.2 MG/DL (ref 8.4–10.2)
CHLORIDE SERPL-SCNC: 103 MMOL/L (ref 96–108)
CHOLEST SERPL-MCNC: 150 MG/DL
CO2 SERPL-SCNC: 28 MMOL/L (ref 21–32)
CREAT SERPL-MCNC: 0.77 MG/DL (ref 0.6–1.3)
CREAT UR-MCNC: 115.7 MG/DL
EOSINOPHIL # BLD AUTO: 0.13 THOUSAND/ÂΜL (ref 0–0.61)
EOSINOPHIL NFR BLD AUTO: 2 % (ref 0–6)
ERYTHROCYTE [DISTWIDTH] IN BLOOD BY AUTOMATED COUNT: 15.2 % (ref 11.6–15.1)
EST. AVERAGE GLUCOSE BLD GHB EST-MCNC: 177 MG/DL
GFR SERPL CREATININE-BSD FRML MDRD: 82 ML/MIN/1.73SQ M
GLUCOSE P FAST SERPL-MCNC: 145 MG/DL (ref 65–99)
HBA1C MFR BLD: 7.8 %
HCT VFR BLD AUTO: 42 % (ref 34.8–46.1)
HCV AB SER QL: NORMAL
HDLC SERPL-MCNC: 51 MG/DL
HGB BLD-MCNC: 12.8 G/DL (ref 11.5–15.4)
HIV 1+2 AB+HIV1 P24 AG SERPL QL IA: NORMAL
HIV 2 AB SERPL QL IA: NORMAL
HIV1 AB SERPL QL IA: NORMAL
HIV1 P24 AG SERPL QL IA: NORMAL
IMM GRANULOCYTES # BLD AUTO: 0.02 THOUSAND/UL (ref 0–0.2)
IMM GRANULOCYTES NFR BLD AUTO: 0 % (ref 0–2)
LDLC SERPL CALC-MCNC: 77 MG/DL (ref 0–100)
LYMPHOCYTES # BLD AUTO: 1.72 THOUSANDS/ÂΜL (ref 0.6–4.47)
LYMPHOCYTES NFR BLD AUTO: 24 % (ref 14–44)
MAGNESIUM SERPL-MCNC: 1.8 MG/DL (ref 1.9–2.7)
MCH RBC QN AUTO: 26.3 PG (ref 26.8–34.3)
MCHC RBC AUTO-ENTMCNC: 30.5 G/DL (ref 31.4–37.4)
MCV RBC AUTO: 86 FL (ref 82–98)
MICROALBUMIN UR-MCNC: 33.3 MG/L
MICROALBUMIN/CREAT 24H UR: 29 MG/G CREATININE (ref 0–30)
MONOCYTES # BLD AUTO: 0.52 THOUSAND/ÂΜL (ref 0.17–1.22)
MONOCYTES NFR BLD AUTO: 7 % (ref 4–12)
NEUTROPHILS # BLD AUTO: 4.75 THOUSANDS/ÂΜL (ref 1.85–7.62)
NEUTS SEG NFR BLD AUTO: 66 % (ref 43–75)
NRBC BLD AUTO-RTO: 0 /100 WBCS
PLATELET # BLD AUTO: 251 THOUSANDS/UL (ref 149–390)
PMV BLD AUTO: 10.5 FL (ref 8.9–12.7)
POTASSIUM SERPL-SCNC: 4 MMOL/L (ref 3.5–5.3)
RBC # BLD AUTO: 4.87 MILLION/UL (ref 3.81–5.12)
SODIUM SERPL-SCNC: 139 MMOL/L (ref 135–147)
T4 FREE SERPL-MCNC: 0.81 NG/DL (ref 0.61–1.12)
TRIGL SERPL-MCNC: 110 MG/DL
TSH SERPL DL<=0.05 MIU/L-ACNC: 5.76 UIU/ML (ref 0.45–4.5)
VIT B12 SERPL-MCNC: 278 PG/ML (ref 180–914)
WBC # BLD AUTO: 7.2 THOUSAND/UL (ref 4.31–10.16)

## 2024-05-22 PROCEDURE — 82570 ASSAY OF URINE CREATININE: CPT

## 2024-05-22 PROCEDURE — 36415 COLL VENOUS BLD VENIPUNCTURE: CPT

## 2024-05-22 PROCEDURE — 85025 COMPLETE CBC W/AUTO DIFF WBC: CPT

## 2024-05-22 PROCEDURE — 82043 UR ALBUMIN QUANTITATIVE: CPT

## 2024-05-22 PROCEDURE — 83036 HEMOGLOBIN GLYCOSYLATED A1C: CPT

## 2024-05-22 PROCEDURE — 82607 VITAMIN B-12: CPT

## 2024-05-22 PROCEDURE — 84443 ASSAY THYROID STIM HORMONE: CPT

## 2024-05-22 PROCEDURE — 86803 HEPATITIS C AB TEST: CPT

## 2024-05-22 PROCEDURE — 87389 HIV-1 AG W/HIV-1&-2 AB AG IA: CPT

## 2024-05-22 PROCEDURE — 83735 ASSAY OF MAGNESIUM: CPT

## 2024-05-22 PROCEDURE — 80048 BASIC METABOLIC PNL TOTAL CA: CPT

## 2024-05-22 PROCEDURE — 82306 VITAMIN D 25 HYDROXY: CPT

## 2024-05-22 PROCEDURE — 80061 LIPID PANEL: CPT

## 2024-05-22 PROCEDURE — 84439 ASSAY OF FREE THYROXINE: CPT

## 2024-05-29 DIAGNOSIS — E11.9 TYPE 2 DIABETES MELLITUS WITHOUT COMPLICATION, WITHOUT LONG-TERM CURRENT USE OF INSULIN (HCC): ICD-10-CM

## 2024-05-30 RX ORDER — METFORMIN HYDROCHLORIDE 500 MG/1
1000 TABLET, EXTENDED RELEASE ORAL 2 TIMES DAILY WITH MEALS
Qty: 120 TABLET | Refills: 0 | Status: SHIPPED | OUTPATIENT
Start: 2024-05-30

## 2024-06-15 PROBLEM — Z12.11 SCREENING FOR COLON CANCER: Status: RESOLVED | Noted: 2021-03-22 | Resolved: 2024-06-15

## 2024-06-15 PROBLEM — Z12.4 ENCOUNTER FOR SCREENING FOR CERVICAL CANCER: Status: RESOLVED | Noted: 2024-05-16 | Resolved: 2024-06-15

## 2024-06-20 ENCOUNTER — OFFICE VISIT (OUTPATIENT)
Dept: INTERNAL MEDICINE CLINIC | Facility: CLINIC | Age: 64
End: 2024-06-20
Payer: COMMERCIAL

## 2024-06-20 VITALS
BODY MASS INDEX: 50.85 KG/M2 | HEIGHT: 63 IN | TEMPERATURE: 98.4 F | WEIGHT: 287 LBS | OXYGEN SATURATION: 96 % | RESPIRATION RATE: 16 BRPM | DIASTOLIC BLOOD PRESSURE: 82 MMHG | HEART RATE: 88 BPM | SYSTOLIC BLOOD PRESSURE: 138 MMHG

## 2024-06-20 DIAGNOSIS — F32.89 OTHER DEPRESSION: ICD-10-CM

## 2024-06-20 DIAGNOSIS — Z23 ENCOUNTER FOR IMMUNIZATION: ICD-10-CM

## 2024-06-20 DIAGNOSIS — Z23 NEED FOR SHINGLES VACCINE: ICD-10-CM

## 2024-06-20 DIAGNOSIS — E83.42 HYPOMAGNESEMIA: ICD-10-CM

## 2024-06-20 DIAGNOSIS — G47.33 OSA (OBSTRUCTIVE SLEEP APNEA): ICD-10-CM

## 2024-06-20 DIAGNOSIS — E66.01 MORBID OBESITY (HCC): ICD-10-CM

## 2024-06-20 DIAGNOSIS — Z23 NEED FOR VACCINATION FOR PNEUMOCOCCUS: ICD-10-CM

## 2024-06-20 DIAGNOSIS — I10 PRIMARY HYPERTENSION: Primary | ICD-10-CM

## 2024-06-20 DIAGNOSIS — E11.9 TYPE 2 DIABETES MELLITUS WITHOUT COMPLICATION, WITHOUT LONG-TERM CURRENT USE OF INSULIN (HCC): ICD-10-CM

## 2024-06-20 DIAGNOSIS — E55.9 HYPOVITAMINOSIS D: ICD-10-CM

## 2024-06-20 DIAGNOSIS — R79.89 ABNORMAL TSH: ICD-10-CM

## 2024-06-20 DIAGNOSIS — Z29.11 NEED FOR RSV IMMUNIZATION: ICD-10-CM

## 2024-06-20 PROCEDURE — 90471 IMMUNIZATION ADMIN: CPT

## 2024-06-20 PROCEDURE — 99214 OFFICE O/P EST MOD 30 MIN: CPT

## 2024-06-20 PROCEDURE — 90750 HZV VACC RECOMBINANT IM: CPT

## 2024-06-20 RX ORDER — CALCIUM CARBONATE/VITAMIN D3 500-10/5ML
400 LIQUID (ML) ORAL DAILY
Qty: 30 TABLET | Refills: 0 | Status: SHIPPED | OUTPATIENT
Start: 2024-06-20 | End: 2024-07-20

## 2024-06-20 RX ORDER — ERGOCALCIFEROL 1.25 MG/1
50000 CAPSULE ORAL WEEKLY
Qty: 4 CAPSULE | Refills: 1 | Status: SHIPPED | OUTPATIENT
Start: 2024-06-20 | End: 2024-08-09

## 2024-06-20 RX ORDER — MAGNESIUM OXIDE 240 MG
240 POWDER IN PACKET (EA) ORAL DAILY
Status: CANCELLED | OUTPATIENT
Start: 2024-06-20 | End: 2024-07-10

## 2024-06-20 NOTE — ASSESSMENT & PLAN NOTE
Lab Results   Component Value Date    HGBA1C 7.8 (H) 05/22/2024   BG at home-  Albumin creatinine ratio-abnormal,-continue ACE, will repeat next year  LDL 77 slightly above goal- continue atorvastatin 40  Recommend tight glucose control to prevent all other microvascular complications   Discussed follow-up with ophthalmology  Continue metformin 1000 mg twice daily  Start ozempic 0.25 mg weekly  Diabetes nutrition education reinforced

## 2024-06-20 NOTE — PATIENT INSTRUCTIONS

## 2024-06-20 NOTE — ASSESSMENT & PLAN NOTE
Patient symptoms significant improved after starting Cymbalta 20 mg.  She reports been better in managing her emotions   Continue Cymbalta 20 mg.

## 2024-06-20 NOTE — ASSESSMENT & PLAN NOTE
Current medication Lotrel 5-40 daily  Blood pressure slightly above goal this visit but acceptable 138/82  Continue to monitor blood pressure and keep a log  Low-salt diet

## 2024-06-20 NOTE — ASSESSMENT & PLAN NOTE
Start ankle Calciferol 50,000 units weekly x 8 weeks  Subsequently continue with vitamin D 2000 units daily  Repeat vitamin D in 3 months

## 2024-06-20 NOTE — PROGRESS NOTES
Ambulatory Visit  Name: Ana Gonzalez      : 1960      MRN: [id number]  Encounter Provider: Lydia Joy MD  Encounter Date: 2024   Encounter department: [facility] INTERNAL MEDICINE [place]    Assessment & Plan   1. Primary hypertension  Assessment & Plan:  Current medication Lotrel 5-40 daily  Blood pressure slightly above goal this visit but acceptable 138/82  Continue to monitor blood pressure and keep a log  Low-salt diet  2. Abnormal TSH  Assessment & Plan:  No clinical symptoms of hypothyroidism  Repeat TSH/T4 in 3 months  Check TPOab  Orders:  -     Anti-microsomal antibody; Future  -     TSH, 3rd generation with Free T4 reflex; Future; Expected date: 2024  3. Hypovitaminosis D  Assessment & Plan:  Start ankle Calciferol 50,000 units weekly x 8 weeks  Subsequently continue with vitamin D 2000 units daily  Repeat vitamin D in 3 months  Orders:  -     ergocalciferol (VITAMIN D2) 50,000 units; Take 1 capsule (50,000 Units total) by mouth once a week for 8 doses  -     Vitamin D 25 hydroxy; Future; Expected date: 2024  4. Hypomagnesemia  -     Magnesium Oxide 400 MG CAPS; Take 1 tablet (400 mg total) by mouth in the morning  5. Type 2 diabetes mellitus without complication, without long-term current use of insulin ([facility])  Assessment & Plan:    Lab Results   Component Value Date    HGBA1C 7.8 (H) 2024   BG at home-  Albumin creatinine ratio-abnormal,-continue ACE, will repeat next year  LDL 77 slightly above goal- continue atorvastatin 40  Recommend tight glucose control to prevent all other microvascular complications   Discussed follow-up with ophthalmology  Continue metformin 1000 mg twice daily  Start ozempic 0.25 mg weekly  Diabetes nutrition education reinforced      Orders:  -     semaglutide, 0.25 or 0.5 mg/dose, (Ozempic) 2 mg/1.5 mL injection pen; Inject 0.19 mL (0.25 mg total) under the skin every 7 days for 5 doses  6. Other depression  Assessment  & Plan:  Patient symptoms significant improved after starting Cymbalta 20 mg.  She reports been better in managing her emotions   Continue Cymbalta 20 mg.  7. Morbid obesity (HCC)  Assessment & Plan:  Patient acknowledges having a poor diet which is worsened by home stressors and current depression  Previously participated in weight loss management but could properly implement requirements  Reviewed rGLP-1-discussed medication mechanism of action, side effects, contraindication.  No family history of medullary  thyroid cancer.  Patient is agreeable to possibly starting  Reinforced healthy diet low-carb, low in saturated fats whole-grain  Encourage patient to exercise formally weekly-at least even starting with some walking  Plan   Will start semaglutide 0.25mg weekly x4 weeks. Escalated doses ever 4 weeks based on response  RTC in 4 weeks     Orders:  -     semaglutide, 0.25 or 0.5 mg/dose, (Ozempic) 2 mg/1.5 mL injection pen; Inject 0.19 mL (0.25 mg total) under the skin every 7 days for 5 doses  8. Need for vaccination for pneumococcus  9. Need for RSV immunization  10. Need for shingles vaccine  11. Encounter for immunization  -     Zoster Vaccine Recombinant IM  12. ANTONETTE (obstructive sleep apnea)  Assessment & Plan:  Patient has severe ANTONETTE does not regularly wear her BiPAP, due to not been tolerant to the machine.  Recommended follow-up with sleep medicine for reevaluation and or recalibration of her BiPAP machine.  Patient plans to follow-up with sleep medicine doctor.      NELLIE Hernández is a pleasant 64-year-old with past medical history of type 2 diabetes, hyperlipidemia, hypertension, ANTONETTE who is here for follow-up visit.  At the last visit patient had moderate active depression was started on Cymbalta.  Patient reports improved emotions on mood management since then.  Has sustained stressors around however she feels she is able to react in a better manner.  We also discussed initiating GLP-1  agonist, after review of medication action side effects and contraindication patient is agreeable to starting.  We discussed abnormal blood work including low vitamin D, magnesium and abnormal TSH.  Will supplement as adequate, repeat TSH in 3 months.  Discussed overall health maintenance screening-patient reports will follow-up with OB/GYN tomorrow, will call GI to schedule her colonoscopy appointment.  He is due for an eye appointment and will follow-up with them.  Patient is working step-by-step in order to get all of this done.  Patient will be getting strings vaccine today, scheduled to get pneumococcal and RSV in the subsequent week.    Return to clinic in 5 weeks for reevaluation after initiation of Ozempic.  Review of Systems   Constitutional:  Negative for chills and fever.   HENT:  Negative for ear pain and sore throat.    Eyes:  Negative for pain and visual disturbance.   Respiratory:  Negative for cough and shortness of breath.    Cardiovascular:  Negative for chest pain and palpitations.   Gastrointestinal:  Negative for abdominal pain and vomiting.   Genitourinary:  Negative for dysuria and hematuria.   Musculoskeletal:  Negative for arthralgias and back pain.   Skin:  Negative for color change and rash.   Neurological:  Negative for seizures and syncope.   All other systems reviewed and are negative.      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    Rash 10/5/2022    Sleep apnea, obstructive      Past Surgical History:   Procedure Laterality Date    BREAST BIOPSY Right 2016    Done at [facility]    WISDOM TOOTH EXTRACTION       Family History   Problem Relation Age of Onset    Heart disease Mother     Diabetes Mother     Liver cancer Mother     Kidney cancer Mother      Cancer Mother     Heart disease Father     Colon cancer Father     Skin cancer Father     Stroke Brother      Current Outpatient Medications on File Prior to Visit   Medication Sig Dispense Refill    amLODIPine-benazepril (LOTREL) 5-40 MG per capsule Take 1 capsule by mouth daily 90 capsule 1    atorvastatin (LIPITOR) 40 mg tablet Take 1 tablet (40 mg total) by mouth daily 90 tablet 1    cholecalciferol (VITAMIN D3) 1,000 units tablet Take 1,000 Units by mouth daily      DULoxetine (CYMBALTA) 20 mg capsule Take 1 capsule (20 mg total) by mouth daily 30 capsule 1    Melatonin 10 MG CAPS Take 10 mg by mouth daily at bedtime as needed      metFORMIN (GLUCOPHAGE-XR) 500 mg 24 hr tablet TAKE 2 TABLETS BY MOUTH TWICE DAILY WITH MEALS 120 tablet 0    Multiple Vitamin (multivitamin) tablet Take 1 tablet by mouth daily      benzonatate (TESSALON PERLES) 100 mg capsule Take 1 capsule (100 mg total) by mouth 3 (three) times a day as needed for cough (Patient not taking: Reported on 5/16/2024) 20 capsule 0    fluticasone (FLONASE) 50 mcg/act nasal spray 2 sprays into each nostril daily for 7 days 9.9 mL 0    fluticasone (FLOVENT HFA) 110 MCG/ACT inhaler Inhale 4 puffs 2 (two) times a day for 14 days Rinse mouth after use. 12 g 0    hydrocortisone 2.5 % cream Apply topically 2 (two) times a day for 7 days 28 g 1    loratadine-pseudoephedrine (CLARITIN-D 12-HOUR) 5-120 mg per tablet Take 1 tablet by mouth 2 (two) times a day (Patient not taking: Reported on 5/16/2024) 20 tablet 0     No current facility-administered medications on file prior to visit.     Allergies   Allergen Reactions    Azithromycin Itching and Rash      Current Outpatient Medications on File Prior to Visit   Medication Sig Dispense Refill    amLODIPine-benazepril (LOTREL) 5-40 MG per capsule Take 1 capsule by mouth daily 90 capsule 1    atorvastatin (LIPITOR) 40 mg tablet Take 1 tablet (40 mg total) by mouth daily 90 tablet 1    cholecalciferol (VITAMIN D3)  "1,000 units tablet Take 1,000 Units by mouth daily      DULoxetine (CYMBALTA) 20 mg capsule Take 1 capsule (20 mg total) by mouth daily 30 capsule 1    Melatonin 10 MG CAPS Take 10 mg by mouth daily at bedtime as needed      metFORMIN (GLUCOPHAGE-XR) 500 mg 24 hr tablet TAKE 2 TABLETS BY MOUTH TWICE DAILY WITH MEALS 120 tablet 0    Multiple Vitamin (multivitamin) tablet Take 1 tablet by mouth daily      benzonatate (TESSALON PERLES) 100 mg capsule Take 1 capsule (100 mg total) by mouth 3 (three) times a day as needed for cough (Patient not taking: Reported on 5/16/2024) 20 capsule 0    fluticasone (FLONASE) 50 mcg/act nasal spray 2 sprays into each nostril daily for 7 days 9.9 mL 0    fluticasone (FLOVENT HFA) 110 MCG/ACT inhaler Inhale 4 puffs 2 (two) times a day for 14 days Rinse mouth after use. 12 g 0    hydrocortisone 2.5 % cream Apply topically 2 (two) times a day for 7 days 28 g 1    loratadine-pseudoephedrine (CLARITIN-D 12-HOUR) 5-120 mg per tablet Take 1 tablet by mouth 2 (two) times a day (Patient not taking: Reported on 5/16/2024) 20 tablet 0     No current facility-administered medications on file prior to visit.      Social History     Tobacco Use    Smoking status: Former     Current packs/day: 0.00     Average packs/day: 1 pack/day for 30.0 years (30.0 ttl pk-yrs)     Types: Cigarettes     Start date: 1979     Quit date: 2009     Years since quitting: 15.4    Smokeless tobacco: Never   Vaping Use    Vaping status: Never Used   Substance and Sexual Activity    Alcohol use: Not Currently     Alcohol/week: 0.0 standard drinks of alcohol    Drug use: Not Currently     Types: Marijuana     Comment: Has not used in a long time(edible)    Sexual activity: Not Currently     Partners: Male     Objective     /82 (BP Location: Left arm, Patient Position: Sitting, Cuff Size: Large)   Pulse 88   Temp 98.4 °F (36.9 °C) (Tympanic)   Resp 16   Ht 5' 3\" (1.6 m)   Wt 130 kg (287 lb)   SpO2 96%   BMI 50.84 " kg/m²     Physical Exam  Vitals and nursing note reviewed.   Constitutional:       General: She is not in acute distress.     Appearance: She is well-developed. She is obese. She is not ill-appearing.   HENT:      Head: Normocephalic and atraumatic.   Eyes:      Conjunctiva/sclera: Conjunctivae normal.   Cardiovascular:      Rate and Rhythm: Normal rate and regular rhythm.      Heart sounds: No murmur heard.  Pulmonary:      Effort: Pulmonary effort is normal. No respiratory distress.      Breath sounds: Normal breath sounds.   Abdominal:      Palpations: Abdomen is soft.      Tenderness: There is no abdominal tenderness.   Musculoskeletal:         General: No swelling.      Cervical back: Neck supple.   Skin:     General: Skin is warm and dry.      Capillary Refill: Capillary refill takes less than 2 seconds.   Neurological:      Mental Status: She is alert.   Psychiatric:         Mood and Affect: Mood normal.     Depression Screening Follow-up Plan: Patient's depression screening was positive with a PHQ-2 score of . Their PHQ-9 score was . Patient assessed for underlying major depression. They have no active suicidal ideations. Brief counseling provided and recommend additional follow-up/re-evaluation next office visit. Continue regular follow-up with their psychologist/therapist/psychiatrist who is managing their mental health condition(s).

## 2024-06-20 NOTE — ASSESSMENT & PLAN NOTE
Patient acknowledges having a poor diet which is worsened by home stressors and current depression  Previously participated in weight loss management but could properly implement requirements  Reviewed rGLP-1-discussed medication mechanism of action, side effects, contraindication.  No family history of medullary  thyroid cancer.  Patient is agreeable to possibly starting  Reinforced healthy diet low-carb, low in saturated fats whole-grain  Encourage patient to exercise formally weekly-at least even starting with some walking  Plan   Will start semaglutide 0.25mg weekly x4 weeks. Escalated doses ever 4 weeks based on response  RTC in 4 weeks

## 2024-06-20 NOTE — ASSESSMENT & PLAN NOTE
Patient has severe ANTONETTE does not regularly wear her BiPAP, due to not been tolerant to the machine.  Recommended follow-up with sleep medicine for reevaluation and or recalibration of her BiPAP machine.  Patient plans to follow-up with sleep medicine doctor.

## 2024-06-21 ENCOUNTER — OFFICE VISIT (OUTPATIENT)
Dept: OBGYN CLINIC | Facility: CLINIC | Age: 64
End: 2024-06-21
Payer: COMMERCIAL

## 2024-06-21 VITALS
WEIGHT: 285.2 LBS | DIASTOLIC BLOOD PRESSURE: 80 MMHG | BODY MASS INDEX: 50.53 KG/M2 | SYSTOLIC BLOOD PRESSURE: 126 MMHG | HEIGHT: 63 IN

## 2024-06-21 DIAGNOSIS — Z01.419 ENCOUNTER FOR GYNECOLOGICAL EXAMINATION (GENERAL) (ROUTINE) WITHOUT ABNORMAL FINDINGS: Primary | ICD-10-CM

## 2024-06-21 DIAGNOSIS — E66.01 MORBID OBESITY (HCC): ICD-10-CM

## 2024-06-21 DIAGNOSIS — Z12.4 ENCOUNTER FOR SCREENING FOR CERVICAL CANCER: ICD-10-CM

## 2024-06-21 PROBLEM — J12.82 PNEUMONIA DUE TO COVID-19 VIRUS: Status: RESOLVED | Noted: 2021-08-18 | Resolved: 2024-06-21

## 2024-06-21 PROBLEM — U07.1 PNEUMONIA DUE TO COVID-19 VIRUS: Status: ACTIVE | Noted: 2021-08-18

## 2024-06-21 PROBLEM — I10 ESSENTIAL HYPERTENSION: Status: ACTIVE | Noted: 2020-09-18

## 2024-06-21 PROBLEM — J12.82 PNEUMONIA DUE TO COVID-19 VIRUS: Status: ACTIVE | Noted: 2021-08-18

## 2024-06-21 PROBLEM — C43.30 MALIGNANT MELANOMA OF FACE (HCC): Status: RESOLVED | Noted: 2021-03-22 | Resolved: 2024-06-21

## 2024-06-21 PROBLEM — J96.01 ACUTE RESPIRATORY FAILURE WITH HYPOXIA (HCC): Status: ACTIVE | Noted: 2021-08-21

## 2024-06-21 PROBLEM — U07.1 PNEUMONIA DUE TO COVID-19 VIRUS: Status: RESOLVED | Noted: 2021-08-18 | Resolved: 2024-06-21

## 2024-06-21 PROBLEM — J96.01 ACUTE RESPIRATORY FAILURE WITH HYPOXIA (HCC): Status: RESOLVED | Noted: 2021-08-21 | Resolved: 2024-06-21

## 2024-06-21 PROBLEM — J06.9 URI (UPPER RESPIRATORY INFECTION): Status: RESOLVED | Noted: 2022-09-19 | Resolved: 2024-06-21

## 2024-06-21 PROCEDURE — G0145 SCR C/V CYTO,THINLAYER,RESCR: HCPCS | Performed by: PHYSICIAN ASSISTANT

## 2024-06-21 PROCEDURE — S0610 ANNUAL GYNECOLOGICAL EXAMINA: HCPCS | Performed by: PHYSICIAN ASSISTANT

## 2024-06-21 PROCEDURE — G0476 HPV COMBO ASSAY CA SCREEN: HCPCS | Performed by: PHYSICIAN ASSISTANT

## 2024-06-21 NOTE — PROGRESS NOTES
ASSESSMENT & PLAN: Ana Gonzalez is a 64 y.o.  with normal gynecologic exam.    1.  Routine well woman exam done today  2.  Pap and HPV:  The patient's last pap and hpv was unknown.    It was normal per patient.    Pap and cotesting was done today.    Current ASCCP Guidelines reviewed.   3.  Mammogram ordered through PCP.  Patient encouraged to schedule.  4.  Colonoscopy screening per patient done approximately 6 years ago established in external gastroenterology office.  Patient states she is overdue and was encouraged to contact them to discuss colon cancer screening, especially with increased BMI and family history in father of colon cancer.  5. The following were reviewed in today's visit: breast self exam, mammography screening ordered, menopause, adequate intake of calcium and vitamin D, exercise, healthy diet, weight loss/BMI reduction to decrease risks and management of chronic conditions, colonoscopy discussed and encouraged to schedule appointment with GI.    RTO 1 year for annual exam, sooner problems arise in the interim.    CC:  Annual Gynecologic Examination    HPI: Ana Gonzalez is a 64 y.o.  who presents for annual gynecologic examination.  She is a new patient with us.    She has the following concerns:  none.    She is under treatment for HTN, vit d def, ANTONETTE (not using machine), T2DM, depression, hyerplipidemia.   Hx of malignant melanoma - sees derm for skin checks.     Healthy diet No; states she tries to eat better. She sometimes skip breakfast. She does eat a lot of carbs.   Vitamins Yes; one a day, vit d, magnesium  Exercise No; sedentary job.     No LMP recorded. Patient is postmenopausal.    Post-menopausal bleeding: none    Bowel or bladder changes: she does note some constipation/loose BM's depending what she eats.  Denies urinary symptoms      Health Maintenance:      She does perform irregular monthly self breast exams.  Denies breast pain, lump, skin change or  nipple discharge.      Last Pap: unknown  Last mammogram: 2019 - [facility].   Last colonoscopy: states about 6 years ago - Dr Bloch. States due for next.     Past Medical History:   Diagnosis Date    Acute respiratory failure with hypoxia (HCC) 2021    Annual physical exam 2021    Chills, diarrhea, fatigue, back pain 2022    COVID-19 2021    Depression     Diabetes mellitus (HCC)     Hypertension     Malignant melanoma of face (HCC) 2021    Pneumonia 2031    Pneumonia due to COVID-19 virus 2021    Last Assessment & Plan:     · Tested positive for COVID-19 2021.  [facility]    · Positive exposure from son.      · Symptoms are unchanged.  She continues to experience headache, chills, sweats, nausea, poor appetite and fatigue.  With occasional cough.    · However, she reports that her pulse ox today was noted at 80 but improved with deep breaths. She does endorse shortness of breath w    Post-COVID-19 condition 2021    Rash 10/05/2022    Sleep apnea, obstructive        Past Surgical History:   Procedure Laterality Date    BREAST BIOPSY Right     [facility] at [facility]    WISDOM TOOTH EXTRACTION         Past OB/Gyn History:  OB History          2    Para   0    Term   0            AB        Living   2         SAB        IAB        Ectopic        Multiple        Live Births   2               History of sexually transmitted infection: No.  History of abnormal pap smears: No .    Patient is not currently sexually active x years.    Family History   Problem Relation Age of Onset    Heart disease Mother     Diabetes Mother     Liver cancer Mother     Kidney cancer Mother     Cancer Mother     Thyroid disease Mother     Heart disease Father     Colon cancer Father     Skin cancer Father     Cancer Father     Stroke Brother        Family history of Breast/Uterine/Ovarian/Colon Cancer: father - colon cancer.     Social History:  Social History      Socioeconomic History    Marital status: /Civil Union     Spouse name: Not on file    Number of children: Not on file    Years of education: Not on file    Highest education level: Not on file   Occupational History    Not on file   Tobacco Use    Smoking status: Former     Current packs/day: 0.00     Average packs/day: 1 pack/day for 30.0 years (30.0 ttl pk-yrs)     Types: Cigarettes     Start date:      Quit date:      Years since quitting: 15.4    Smokeless tobacco: Never   Vaping Use    Vaping status: Never Used   Substance and Sexual Activity    Alcohol use: Yes     Comment: socially    Drug use: Not Currently     Types: Marijuana     Comment: Has not used in a long time(edible)    Sexual activity: Not Currently     Partners: Male   Other Topics Concern    Not on file   Social History Narrative    Most recent tobacco use screenin2019      Do you currently or have you served in the [employer] [place]:   No      Were you activated, into active duty, as a member of the [employer] or as a Reservist:   No     - As per [place]      Social Determinants of Health     Financial Resource Strain: Not on file   Food Insecurity: Not on file   Transportation Needs: Not on file   Physical Activity: Not on file   Stress: Not on file   Social Connections: Not on file   Intimate Partner Violence: Not on file   Housing Stability: Not on file       Allergies   Allergen Reactions    Azithromycin Itching and Rash       Current Outpatient Medications:     amLODIPine-benazepril (LOTREL) 5-40 MG per capsule, Take 1 capsule by mouth daily, Disp: 90 capsule, Rfl: 1    atorvastatin (LIPITOR) 40 mg tablet, Take 1 tablet (40 mg total) by mouth daily, Disp: 90 tablet, Rfl: 1    cholecalciferol (VITAMIN D3) 1,000 units tablet, Take 1,000 Units by mouth daily, Disp: , Rfl:     DULoxetine (CYMBALTA) 20 mg capsule, Take 1 capsule (20 mg total) by mouth daily, Disp: 30 capsule, Rfl: 1    ergocalciferol (VITAMIN D2)  50,000 units, Take 1 capsule (50,000 Units total) by mouth once a week for 8 doses, Disp: 4 capsule, Rfl: 1    Magnesium Oxide 400 MG CAPS, Take 1 tablet (400 mg total) by mouth in the morning, Disp: 30 tablet, Rfl: 0    Melatonin 10 MG CAPS, Take 10 mg by mouth daily at bedtime as needed, Disp: , Rfl:     metFORMIN (GLUCOPHAGE-XR) 500 mg 24 hr tablet, TAKE 2 TABLETS BY MOUTH TWICE DAILY WITH MEALS, Disp: 120 tablet, Rfl: 0    Multiple Vitamin (multivitamin) tablet, Take 1 tablet by mouth daily, Disp: , Rfl:     semaglutide, 0.25 or 0.5 mg/dose, (Ozempic) 2 mg/1.5 mL injection pen, Inject 0.19 mL (0.25 mg total) under the skin every 7 days for 5 doses, Disp: 0.95 mL, Rfl: 0    fluticasone (FLONASE) 50 mcg/act nasal spray, 2 sprays into each nostril daily for 7 days (Patient not taking: Reported on 6/21/2024), Disp: 9.9 mL, Rfl: 0    fluticasone (FLOVENT HFA) 110 MCG/ACT inhaler, Inhale 4 puffs 2 (two) times a day for 14 days Rinse mouth after use. (Patient not taking: Reported on 6/21/2024), Disp: 12 g, Rfl: 0    hydrocortisone 2.5 % cream, Apply topically 2 (two) times a day for 7 days, Disp: 28 g, Rfl: 1    loratadine-pseudoephedrine (CLARITIN-D 12-HOUR) 5-120 mg per tablet, Take 1 tablet by mouth 2 (two) times a day (Patient not taking: Reported on 5/16/2024), Disp: 20 tablet, Rfl: 0      Review of Systems  Constitutional :no fever, feels well, no tiredness, no recent weight gain or loss  ENT: no ear ache, no loss of hearing, no nosebleeds or nasal discharge, no sore throat or hoarseness.  Cardiovascular: no complaints of slow or fast heart beat, no chest pain, no palpitations, no leg claudication or lower extremity edema.  Respiratory: no complaints of shortness of shortness of breath, no MCDERMOTT  Breasts:no complaints of breast pain, breast lump, or nipple discharge  Gastrointestinal: no complaints of abdominal pain, constipation, nausea, vomiting, or diarrhea or bloody stools  Genitourinary : no complaints of  "dysuria, incontinence, pelvic pain, no dysmenorrhea, vaginal discharge/itch/odor/dryness postmenopausal bleeding.  Or abnormal vaginal bleeding and as noted in HPI.  Musculoskeletal: no complaints of arthralgia, no myalgia, no joint swelling or stiffness, no limb pain or swelling.  Integumentary: no complaints of skin rash or lesion, itching or dry skin  Neurological: no complaints of headache, no confusion, no numbness or tingling, no dizziness or fainting  Mental health: mild depression, recently started on cymbalta.    Objective      /80 (BP Location: Left arm, Patient Position: Sitting, Cuff Size: Adult)   Ht 5' 3\" (1.6 m)   Wt 129 kg (285 lb 3.2 oz)   BMI 50.52 kg/m²     General:   appears stated age, cooperative, alert normal mood and affect.  BMI 50.5   Neck: normal, supple,trachea midline, no masses   Heart: regular rate and rhythm, S1, S2 normal, no murmur, click, rub or gallop   Lungs: clear to auscultation bilaterally   Breasts: normal appearance, no masses or tenderness, No nipple retraction or dimpling, No nipple discharge or bleeding, No axillary or supraclavicular adenopathy, Normal to palpation without dominant masses   Abdomen: soft, non-tender, without masses or organomegaly   Vulva: normal female genitalia, no lesions   Vagina: normal vagina, no discharge, exudate, lesion, or erythema   Urethra: normal   Cervix: Normal, no discharge. PAP done. Nontender.   Uterus: Non-tender. Exam limited due to body habitus   Adnexa: Non-tender B/L. Exam limited due to body habitus   Lymphatic palpation of lymph nodes in neck, axilla, groin and/or other locations: no lymphadenopathy or masses noted   Skin normal skin turgor and no rashes.   Psychiatric orientation to person, place, and time: normal. mood and affect: normal           "

## 2024-06-24 LAB
HPV HR 12 DNA CVX QL NAA+PROBE: NEGATIVE
HPV16 DNA CVX QL NAA+PROBE: NEGATIVE
HPV18 DNA CVX QL NAA+PROBE: NEGATIVE

## 2024-06-30 DIAGNOSIS — E11.9 TYPE 2 DIABETES MELLITUS WITHOUT COMPLICATION, WITHOUT LONG-TERM CURRENT USE OF INSULIN (HCC): ICD-10-CM

## 2024-06-30 RX ORDER — METFORMIN HYDROCHLORIDE 500 MG/1
1000 TABLET, EXTENDED RELEASE ORAL 2 TIMES DAILY WITH MEALS
Qty: 120 TABLET | Refills: 5 | Status: SHIPPED | OUTPATIENT
Start: 2024-06-30

## 2024-07-01 LAB
LAB AP GYN PRIMARY INTERPRETATION: NORMAL
Lab: NORMAL

## 2024-07-12 DIAGNOSIS — E83.42 HYPOMAGNESEMIA: ICD-10-CM

## 2024-07-21 DIAGNOSIS — F32.0 CURRENT MILD EPISODE OF MAJOR DEPRESSIVE DISORDER WITHOUT PRIOR EPISODE (HCC): ICD-10-CM

## 2024-07-21 RX ORDER — DULOXETIN HYDROCHLORIDE 20 MG/1
20 CAPSULE, DELAYED RELEASE ORAL DAILY
Qty: 30 CAPSULE | Refills: 5 | Status: SHIPPED | OUTPATIENT
Start: 2024-07-21

## 2024-07-25 ENCOUNTER — OFFICE VISIT (OUTPATIENT)
Dept: INTERNAL MEDICINE CLINIC | Facility: CLINIC | Age: 64
End: 2024-07-25
Payer: COMMERCIAL

## 2024-07-25 VITALS
OXYGEN SATURATION: 94 % | SYSTOLIC BLOOD PRESSURE: 140 MMHG | WEIGHT: 288 LBS | HEART RATE: 81 BPM | BODY MASS INDEX: 51.03 KG/M2 | HEIGHT: 63 IN | TEMPERATURE: 98.7 F | DIASTOLIC BLOOD PRESSURE: 78 MMHG

## 2024-07-25 DIAGNOSIS — Z23 ENCOUNTER FOR IMMUNIZATION: ICD-10-CM

## 2024-07-25 DIAGNOSIS — E11.9 TYPE 2 DIABETES MELLITUS WITHOUT COMPLICATION, WITHOUT LONG-TERM CURRENT USE OF INSULIN (HCC): ICD-10-CM

## 2024-07-25 DIAGNOSIS — I10 PRIMARY HYPERTENSION: ICD-10-CM

## 2024-07-25 DIAGNOSIS — G47.33 OSA (OBSTRUCTIVE SLEEP APNEA): Primary | ICD-10-CM

## 2024-07-25 DIAGNOSIS — F32.89 OTHER DEPRESSION: ICD-10-CM

## 2024-07-25 DIAGNOSIS — Z29.11 NEED FOR RSV IMMUNIZATION: ICD-10-CM

## 2024-07-25 DIAGNOSIS — E66.01 MORBID OBESITY (HCC): ICD-10-CM

## 2024-07-25 PROCEDURE — 99214 OFFICE O/P EST MOD 30 MIN: CPT

## 2024-07-25 PROCEDURE — 90471 IMMUNIZATION ADMIN: CPT

## 2024-07-25 PROCEDURE — 90677 PCV20 VACCINE IM: CPT

## 2024-07-25 NOTE — ASSESSMENT & PLAN NOTE
Lab Results   Component Value Date    HGBA1C 7.8 (H) 05/22/2024     Does not check her blood sugars at home  Reports improvement in diet more fruits and vegetables  Will be starting to get cardio workout on her bike at home  Continue metformin 1000 mg twice daily  Ozempic increase to 0.5 mg weekly  Continue atorvastatin 40  Follow-up with ophthalmology

## 2024-07-25 NOTE — ASSESSMENT & PLAN NOTE
No significant weight changes, has been taking Ozempic 0.25 mg weekly x 3  Noticing some changes in her diet and appetite, feels like she is eating less  Will be starting structured physical activity with cardio weekly-recommend at least 30 minutes daily

## 2024-07-25 NOTE — PATIENT INSTRUCTIONS
Ozempic has been increased to 0.5 mg every week  Follow-up with psychology referral-use the website [employer]com  Referral sent for sleep medicine please follow-up with them sleep study and treatment  Referral to diabetes management-education  Please follow-up with us in 4 weeks

## 2024-07-25 NOTE — ASSESSMENT & PLAN NOTE
More daytime fatigue and sleepiness  Does not use of BiPAP due to the mask   Recommend following up with sleep medicine for-repeat sleep studies or recalibration   Warm blanket/Family informed

## 2024-07-25 NOTE — ASSESSMENT & PLAN NOTE
Blood pressure 140/78\  Patient does not check blood pressure at home, denies any chest pain shortness of breath headache  Current medication Lotrel 5-40 daily   Blood pressure persistently above goal, will consider adding HCTZ,   Recommend low-salt diet, recommend checking blood pressure at home and keeping a log for review at the next visit

## 2024-07-25 NOTE — PROGRESS NOTES
Ambulatory Visit  Name: Ana Gonzalez      : 1960      MRN: [id number]  Encounter Provider: Lydia Joy MD  Encounter Date: 2024   Encounter department: [facility] INTERNAL MEDICINE [place]    Assessment & Plan   1. ANTONETTE (obstructive sleep apnea)  Assessment & Plan:  More daytime fatigue and sleepiness  Does not use of BiPAP due to the mask   Recommend following up with sleep medicine for-repeat sleep studies or recalibration  Orders:  -     Ambulatory Referral to Sleep Medicine; Future  2. Type 2 diabetes mellitus without complication, without long-term current use of insulin (HCC)  Assessment & Plan:    Lab Results   Component Value Date    HGBA1C 7.8 (H) 2024     Does not check her blood sugars at home  Reports improvement in diet more fruits and vegetables  Will be starting to get cardio workout on her bike at home  Continue metformin 1000 mg twice daily  Ozempic increase to 0.5 mg weekly  Continue atorvastatin 40  Follow-up with ophthalmology  Orders:  -     semaglutide, 0.25 or 0.5 mg/dose, (Ozempic) 2 mg/1.5 mL injection pen; Inject 0.38 mL (0.5 mg total) under the skin every 7 days for 4 doses  3. Morbid obesity (HCC)  Assessment & Plan:  No significant weight changes, has been taking Ozempic 0.25 mg weekly x 3  Noticing some changes in her diet and appetite, feels like she is eating less  Will be starting structured physical activity with cardio weekly-recommend at least 30 minutes daily  Orders:  -     semaglutide, 0.25 or 0.5 mg/dose, (Ozempic) 2 mg/1.5 mL injection pen; Inject 0.38 mL (0.5 mg total) under the skin every 7 days for 4 doses  4. Need for RSV immunization  -     RSVPreF3 Vac Recomb Adjuvanted 120 MCG/0.5ML SUSR; Inject 0.5 mL into a muscle 1 (one) time for 1 dose  5. Encounter for immunization  -     Pneumococcal Conjugate Vaccine 20-valent (Pcv20)  6. Primary hypertension  Assessment & Plan:  Blood pressure 140/78\  Patient does not check blood  pressure at home, denies any chest pain shortness of breath headache  Current medication Lotrel 5-40 daily   Blood pressure persistently above goal, will consider adding HCTZ,   Recommend low-salt diet, recommend checking blood pressure at home and keeping a log for review at the next visit  7. Other depression  Assessment & Plan:  Symptoms much improved since starting Cymbalta  Patient reports however being more fatigued and sleepy during the day  Recommend taking Cymbalta 20 mg at bedtime   She also plans to engage with routine physical activity-cardio  Will follow-up with psychotherapy([email or web address])       History of Present Illness     HPI  Ana Hernández is a pleasant 64-year-old with past medical history of type 2 diabetes, hyperlipidemia, hypertension, ANTONETTE who is here for follow-up visit for chronic medical conditions  Started on Ozempic 0.25 mg weekly has been taking for 3weeks.Tolerating/side effects- well   Weight changes- none.  Patient will be starting scheduled cardio exercises on her bike.  Diet is improved eating more fruits and vegetables.  Feels like she has less appetite and eating less.  Also currently being treated for depression on Cymbalta 20 mg.  She reports more fatigue and feeling sleepy during the day.  She takes Cymbalta in the morning.  Recommend taking Cymbalta at bedtime  Was seen by OB/GYN and had HPV done which is negative  Due for colonoscopy-plans to follow-up with GI, plans to follow-up with ophthalmology for yearly eye check  Patient also here for pneumoccocal vaccine/RSV.  Prevnar 20 administered in office.  Hours will be administered at pharmacy                Review of Systems   Constitutional:  Positive for fatigue. Negative for chills and fever.   HENT:  Negative for ear pain and sore throat.    Eyes:  Negative for pain and visual disturbance.   Respiratory:  Negative for cough and shortness of breath.    Cardiovascular:  Negative for chest pain and palpitations.    Gastrointestinal:  Negative for abdominal pain and vomiting.   Genitourinary:  Negative for dysuria and hematuria.   Musculoskeletal:  Negative for arthralgias and back pain.   Skin:  Negative for color change and rash.   Neurological:  Negative for seizures and syncope.   All other systems reviewed and are negative.    Pertinent Medical History     Medical History Reviewed by provider this encounter:  Tobacco  Allergies  Meds  Problems  Med Hx  Surg Hx  Fam Hx       Past Medical History   Past Medical History:   Diagnosis Date    Acute respiratory failure with hypoxia (HCC) 08/21/2021    Annual physical exam 03/22/2021    Chills, diarrhea, fatigue, back pain 06/03/2022    COVID-19 08/18/2021    Depression     Diabetes mellitus (HCC)     Hypertension     Malignant melanoma of face (HCC) 03/22/2021    Pneumonia August 2031    Pneumonia due to COVID-19 virus 08/18/2021    Last Assessment & Plan:     · Tested positive for COVID-19 8/17/2021.  [facility]    · Positive exposure from son.      · Symptoms are unchanged.  She continues to experience headache, chills, sweats, nausea, poor appetite and fatigue.  With occasional cough.    · However, she reports that her pulse ox today was noted at 80 but improved with deep breaths. 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    Rash 10/05/2022    Sleep apnea, obstructive      Past Surgical History:   Procedure Laterality Date    BREAST BIOPSY Right 2016    Done at [facility]    WISDOM TOOTH EXTRACTION       Family History   Problem Relation Age of Onset    Heart disease Mother     Diabetes Mother     Liver cancer Mother     Kidney cancer Mother     Cancer Mother     Thyroid disease Mother     Heart disease Father     Colon cancer Father     Skin cancer Father     Cancer Father     Stroke Brother      Current Outpatient Medications on File Prior to Visit   Medication Sig Dispense Refill    amLODIPine-benazepril (LOTREL) 5-40 MG  "per capsule Take 1 capsule by mouth daily 90 capsule 1    atorvastatin (LIPITOR) 40 mg tablet Take 1 tablet (40 mg total) by mouth daily 90 tablet 1    cholecalciferol (VITAMIN D3) 1,000 units tablet Take 1,000 Units by mouth daily      DULoxetine (CYMBALTA) 20 mg capsule TAKE 1 CAPSULE(20 MG) BY MOUTH DAILY 30 capsule 5    ergocalciferol (VITAMIN D2) 50,000 units Take 1 capsule (50,000 Units total) by mouth once a week for 8 doses 4 capsule 1    Melatonin 10 MG CAPS Take 10 mg by mouth daily at bedtime as needed As needed      metFORMIN (GLUCOPHAGE-XR) 500 mg 24 hr tablet TAKE 2 TABLETS BY MOUTH TWICE DAILY WITH MEALS 120 tablet 5    Multiple Vitamin (multivitamin) tablet Take 1 tablet by mouth daily      [DISCONTINUED] Magnesium Oxide -Mg Supplement 400 MG CAPS TAKE 1 CAPSULE BY MOUTH EVERY MORNING 30 capsule 1    [DISCONTINUED] semaglutide, 0.25 or 0.5 mg/dose, (Ozempic) 2 mg/1.5 mL injection pen Inject under the skin every 7 days       No current facility-administered medications on file prior to visit.     Allergies   Allergen Reactions    Azithromycin Itching and Rash      Objective     /78 (BP Location: Left arm, Patient Position: Sitting, Cuff Size: Large)   Pulse 81   Temp 98.7 °F (37.1 °C) (Tympanic)   Ht 5' 3\" (1.6 m)   Wt 131 kg (288 lb)   SpO2 94%   BMI 51.02 kg/m²     Physical Exam  Vitals and nursing note reviewed.   Constitutional:       General: She is not in acute distress.     Appearance: She is well-developed.   HENT:      Head: Normocephalic and atraumatic.   Eyes:      Conjunctiva/sclera: Conjunctivae normal.   Cardiovascular:      Rate and Rhythm: Normal rate and regular rhythm.      Heart sounds: No murmur heard.  Pulmonary:      Effort: Pulmonary effort is normal. No respiratory distress.      Breath sounds: Normal breath sounds.   Abdominal:      Palpations: Abdomen is soft.      Tenderness: There is no abdominal tenderness.   Musculoskeletal:         General: No swelling.      " Cervical back: Neck supple.   Skin:     General: Skin is warm and dry.      Capillary Refill: Capillary refill takes less than 2 seconds.   Neurological:      Mental Status: She is alert.   Psychiatric:         Mood and Affect: Mood normal.       Administrative Statements

## 2024-07-25 NOTE — ASSESSMENT & PLAN NOTE
Symptoms much improved since starting Cymbalta  Patient reports however being more fatigued and sleepy during the day  Recommend taking Cymbalta 20 mg at bedtime   She also plans to engage with routine physical activity-cardio  Will follow-up with psychotherapy([email or web address])

## 2024-08-03 DIAGNOSIS — G47.33 OSA (OBSTRUCTIVE SLEEP APNEA): Primary | ICD-10-CM

## 2024-08-09 DIAGNOSIS — E66.01 MORBID OBESITY (HCC): ICD-10-CM

## 2024-08-09 DIAGNOSIS — E11.9 TYPE 2 DIABETES MELLITUS WITHOUT COMPLICATION, WITHOUT LONG-TERM CURRENT USE OF INSULIN (HCC): ICD-10-CM

## 2024-08-11 RX ORDER — SEMAGLUTIDE 0.68 MG/ML
INJECTION, SOLUTION SUBCUTANEOUS
Qty: 3 ML | Refills: 1 | Status: SHIPPED | OUTPATIENT
Start: 2024-08-11

## 2024-08-30 ENCOUNTER — OFFICE VISIT (OUTPATIENT)
Dept: INTERNAL MEDICINE CLINIC | Facility: CLINIC | Age: 64
End: 2024-08-30
Payer: COMMERCIAL

## 2024-08-30 VITALS
TEMPERATURE: 96.8 F | DIASTOLIC BLOOD PRESSURE: 80 MMHG | OXYGEN SATURATION: 96 % | HEIGHT: 62 IN | HEART RATE: 87 BPM | SYSTOLIC BLOOD PRESSURE: 110 MMHG | WEIGHT: 286.8 LBS | BODY MASS INDEX: 52.78 KG/M2

## 2024-08-30 DIAGNOSIS — E11.9 TYPE 2 DIABETES MELLITUS WITHOUT COMPLICATION, WITHOUT LONG-TERM CURRENT USE OF INSULIN (HCC): Primary | ICD-10-CM

## 2024-08-30 DIAGNOSIS — L29.9 ITCHING: ICD-10-CM

## 2024-08-30 DIAGNOSIS — R11.0 NAUSEA: ICD-10-CM

## 2024-08-30 DIAGNOSIS — R79.89 ELEVATED TSH: ICD-10-CM

## 2024-08-30 PROCEDURE — 99213 OFFICE O/P EST LOW 20 MIN: CPT

## 2024-08-30 RX ORDER — LORATADINE 10 MG/1
10 TABLET ORAL DAILY
Qty: 14 TABLET | Refills: 0 | Status: SHIPPED | OUTPATIENT
Start: 2024-08-30 | End: 2024-09-13

## 2024-08-30 RX ORDER — PHENOL 1.4 %
600 AEROSOL, SPRAY (ML) MUCOUS MEMBRANE 2 TIMES DAILY WITH MEALS
COMMUNITY

## 2024-08-30 RX ORDER — METOCLOPRAMIDE 10 MG/1
10 TABLET ORAL 4 TIMES DAILY
Qty: 120 TABLET | Refills: 0 | Status: SHIPPED | OUTPATIENT
Start: 2024-08-30 | End: 2024-09-29

## 2024-08-30 NOTE — ASSESSMENT & PLAN NOTE
Lab Results   Component Value Date     [id number] 5.760 (H) 05/22/2024     FREET4 0.81    Subclinical hypothyroidism   Discuss with patient that her dry skin and brittle nails might be related to thyroid dysfunction  Will repeat labs  Given that patient is symptomatic she may benefit from treatment  F/u in 4 weeks   In the meantime, Claritin 10 mg for itching has been ordered  Can continue with Eucerin. Discuss with patient to use tub base instead of the lotion

## 2024-08-30 NOTE — ASSESSMENT & PLAN NOTE
She has been experiencing bilateral itching on the flexor surface of her elbows, hands and ankles for the past few weeks. States that it started once her  came back from rehab  On examination she has dry skin with excoriations. No rash present   Possible related to her dry skin   Claritin 10 mg for itching has been ordered  Can continue with Eucerin. Discuss with patient to use tub base Eucerin instead of the lotion  She will call the office if she does improve in the next 3-4 days

## 2024-08-30 NOTE — PROGRESS NOTES
Ambulatory Visit  Name: Ana Gonzalez      : 1960      MRN: [id number]  Encounter Provider: Nelly Tompkins MD  Encounter Date: 2024   Encounter department: [facility] [place]    Assessment & Plan   1. Type 2 diabetes mellitus without complication, without long-term current use of insulin ([facility])  Assessment & Plan:    Lab Results   Component Value Date    HGBA1C 7.8 (H) 2024   On week 2 of ozempic 0.5 mg. she has been experiencing nausea at least once a week causing her to leave work  Discuss with patient that this is a common side effect  Will keep her on 0.5 mg for 8 weeks instead 4 weeks  Reglan PRN ordered  F/u in 4 weeks     2. Nausea  -     metoclopramide (Reglan) 10 mg tablet; Take 1 tablet (10 mg total) by mouth 4 (four) times a day  3. Itching  Assessment & Plan:  She has been experiencing bilateral itching on the flexor surface of her elbows, hands and ankles for the past few weeks. States that it started once her  came back from rehab  On examination she has dry skin with excoriations. No rash present   Possible related to her dry skin   Claritin 10 mg for itching has been ordered  Can continue with Eucerin. Discuss with patient to use tub base Eucerin instead of the lotion  She will call the office if she does improve in the next 3-4 days   Orders:  -     loratadine (CLARITIN) 10 mg tablet; Take 1 tablet (10 mg total) by mouth daily for 14 days  4. Elevated TSH  Assessment & Plan:        Lab Results   Component Value Date     [id number] 5.760 (H) 2024     FREET4 0.81    Subclinical hypothyroidism   Discuss with patient that her dry skin and brittle nails might be related to thyroid dysfunction  Will repeat labs  Given that patient is symptomatic she may benefit from treatment  F/u in 4 weeks   In the meantime, Claritin 10 mg for itching has been ordered  Can continue with Eucerin. Discuss with patient to use tub base instead of the  lotion         History of Present Illness     HPI  Ms. Villafana is a 64 year old female with a history T2Dm, HTN, Depression and sleep apnea who presents for follow up. She has been taking ozempic currently at 0.5 mg weekly. Reports that she has been experiencing nausea at least once a week while on Ozempic.  Denies any vomiting or abdominal pain or diarrhea.  She also complains of bilateral itching on her elbows hands and ankles that started her  came home from rehab.  Reports that she has been applying Eucerin lotion significant relief.  Review of Systems   Constitutional:  Negative for chills and fever.   HENT:  Negative for ear pain and sore throat.    Eyes:  Negative for pain and visual disturbance.   Respiratory:  Negative for cough and shortness of breath.    Cardiovascular:  Negative for chest pain and palpitations.   Gastrointestinal:  Negative for abdominal pain and vomiting.   Genitourinary:  Negative for dysuria and hematuria.   Musculoskeletal:  Negative for arthralgias and back pain.   Skin:  Negative for color change and rash.        Dry skin and itching   Neurological:  Negative for seizures and syncope.   All other systems reviewed and are negative.    Past Medical History:   Diagnosis Date    Acute respiratory failure with hypoxia (HCC) 08/21/2021    Annual physical exam 03/22/2021    Chills, diarrhea, fatigue, back pain 06/03/2022    COVID-19 08/18/2021    Depression     Diabetes mellitus (HCC)     Hypertension     Malignant melanoma of face ([facility]) 03/22/2021    Pneumonia August 2031    Pneumonia due to COVID-19 virus 08/18/2021    Last Assessment & Plan:     · Tested positive for COVID-19 8/17/2021.  [facility]    · Positive exposure from son.      · Symptoms are unchanged.  She continues to experience headache, chills, sweats, nausea, poor appetite and fatigue.  With occasional cough.    · However, she reports that her pulse ox today was noted at 80 but improved with deep breaths.  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00     Average packs/day: 1 pack/day for 30.0 years (30.0 ttl pk-yrs)     Types: Cigarettes     Start date: 1979     Quit date: 2009     Years since quitting: 15.6    Smokeless tobacco: Never   Vaping Use    Vaping status: Never Used   Substance and Sexual Activity    Alcohol use: Yes     Comment: socially    Drug use: Not Currently     Types: Marijuana     Comment: Has not used in a long time(edible)    Sexual activity: Not Currently     Partners: Male     Current Outpatient Medications on File Prior to Visit   Medication Sig    amLODIPine-benazepril (LOTREL) 5-40 MG per capsule Take 1 capsule by mouth daily    atorvastatin (LIPITOR) 40 mg tablet Take 1 tablet (40 mg total) by mouth daily    calcium carbonate (OS-DENISSE) 600 MG tablet Take 600 mg by mouth 2 (two) times a day with meals    DULoxetine (CYMBALTA) 20 mg capsule TAKE 1 CAPSULE(20 MG) BY MOUTH DAILY    metFORMIN (GLUCOPHAGE-XR) 500 mg 24 hr tablet TAKE 2 TABLETS BY MOUTH TWICE DAILY WITH MEALS    Multiple Vitamin (multivitamin) tablet Take 1 tablet by mouth daily    semaglutide, 0.25 or 0.5 mg/dose, (Ozempic, 0.25 or 0.5 MG/DOSE,) 2 mg/3 mL injection pen INJECT 0.25 MG UNDER THE SKIN EVERY 7 DAYS FOR 5 DOSES.    cholecalciferol (VITAMIN D3) 1,000 units tablet Take 1,000 Units by mouth daily (Patient not taking: Reported  "on 8/30/2024)    ergocalciferol (VITAMIN D2) 50,000 units Take 1 capsule (50,000 Units total) by mouth once a week for 8 doses (Patient not taking: Reported on 8/30/2024)    Melatonin 10 MG CAPS Take 10 mg by mouth daily at bedtime as needed As needed (Patient not taking: Reported on 8/30/2024)     Allergies   Allergen Reactions    Azithromycin Itching and Rash     Immunization History   Administered Date(s) Administered    COVID-19 PFIZER VACCINE 0.3 ML IM 03/27/2021, 04/17/2021, 12/11/2021    COVID-19 Pfizer vac (Frank-sucrose, gray cap) 12 yr+ IM 05/18/2022    Influenza, recombinant, quadrivalent,injectable, preservative free 10/15/2021    Pneumococcal Conjugate Vaccine 20-valent (Pcv20), Polysace 07/25/2024    Pneumococcal Polysaccharide PPV23 02/22/2021    Tdap 09/14/2021    Zoster Vaccine Recombinant 06/20/2024     Objective     /80 (BP Location: Left arm, Patient Position: Sitting, Cuff Size: Large)   Pulse 87   Temp (!) 96.8 °F (36 °C) (Tympanic)   Ht 5' 2\" (1.575 m)   Wt 130 kg (286 lb 12.8 oz)   SpO2 96%   BMI 52.46 kg/m²     Physical Exam  Vitals and nursing note reviewed.   Constitutional:       General: She is not in acute distress.     Appearance: She is well-developed.   HENT:      Head: Normocephalic and atraumatic.   Eyes:      Conjunctiva/sclera: Conjunctivae normal.   Cardiovascular:      Rate and Rhythm: Normal rate and regular rhythm.      Heart sounds: No murmur heard.  Pulmonary:      Effort: Pulmonary effort is normal. No respiratory distress.      Breath sounds: Normal breath sounds.   Abdominal:      Palpations: Abdomen is soft.      Tenderness: There is no abdominal tenderness.   Musculoskeletal:         General: No swelling.      Cervical back: Neck supple.   Skin:     General: Skin is warm and dry.      Capillary Refill: Capillary refill takes less than 2 seconds.      Comments: Excoriation marks on the flexor surface of her elbows.   Neurological:      Mental Status: She is " alert.   Psychiatric:         Mood and Affect: Mood normal.

## 2024-08-30 NOTE — ASSESSMENT & PLAN NOTE
Lab Results   Component Value Date    HGBA1C 7.8 (H) 05/22/2024   On week 2 of ozempic 0.5 mg. she has been experiencing nausea at least once a week causing her to leave work  Discuss with patient that this is a common side effect  Will keep her on 0.5 mg for 8 weeks instead 4 weeks  Reglan PRN ordered  F/u in 4 weeks

## 2024-09-13 DIAGNOSIS — E11.9 TYPE 2 DIABETES MELLITUS WITHOUT COMPLICATION, WITHOUT LONG-TERM CURRENT USE OF INSULIN (HCC): ICD-10-CM

## 2024-09-13 RX ORDER — METFORMIN HCL 500 MG
1000 TABLET, EXTENDED RELEASE 24 HR ORAL 2 TIMES DAILY WITH MEALS
Qty: 120 TABLET | Refills: 5 | Status: SHIPPED | OUTPATIENT
Start: 2024-09-13

## 2024-10-03 ENCOUNTER — OFFICE VISIT (OUTPATIENT)
Dept: INTERNAL MEDICINE CLINIC | Facility: CLINIC | Age: 64
End: 2024-10-03

## 2024-10-03 VITALS
HEART RATE: 82 BPM | BODY MASS INDEX: 52.86 KG/M2 | OXYGEN SATURATION: 93 % | SYSTOLIC BLOOD PRESSURE: 150 MMHG | DIASTOLIC BLOOD PRESSURE: 77 MMHG | RESPIRATION RATE: 20 BRPM | TEMPERATURE: 97.3 F | WEIGHT: 289 LBS

## 2024-10-03 DIAGNOSIS — R53.83 FATIGUE, UNSPECIFIED TYPE: ICD-10-CM

## 2024-10-03 DIAGNOSIS — E11.9 TYPE 2 DIABETES MELLITUS WITHOUT COMPLICATION, WITHOUT LONG-TERM CURRENT USE OF INSULIN (HCC): ICD-10-CM

## 2024-10-03 DIAGNOSIS — R79.89 ELEVATED TSH: Primary | ICD-10-CM

## 2024-10-03 DIAGNOSIS — I10 ESSENTIAL HYPERTENSION: ICD-10-CM

## 2024-10-03 NOTE — ASSESSMENT & PLAN NOTE
.TSH on 05/22/24 was 5.7 with normal free T4 at 0.81  Patient has not gotten repeat blood work done but she will get it done within one week  Discussed that once she completes the blood work we will discuss treatment options

## 2024-10-03 NOTE — ASSESSMENT & PLAN NOTE
Lab Results   Component Value Date    HGBA1C 7.8 (H) 05/22/2024     She is on week 6 of ozempic 0.5 mg  She reports that at time she has some mild nausea but has not needed to take any Reglan  Discussed that she will start taking 1mg for four weeks start on Sunday.  Discussed that if she tolerates the 1mg dose, then we will plan to increase to 2 mg after 4 weeks  She will continue with Reglan as needed  Dicussed the use of imodium if she develops diarrhea

## 2024-10-03 NOTE — PROGRESS NOTES
Ambulatory Visit  Name: Ana Gonzalez      : 1960      MRN: [id number]  Encounter Provider: Nelly Tompkins MD  Encounter Date: 10/3/2024   Encounter department: [facility] [place]    Assessment & Plan  Elevated TSH  .TSH on 24 was 5.7 with normal free T4 at 0.81  Patient has not gotten repeat blood work done but she will get it done within one week  Discussed that once she completes the blood work we will discuss treatment options       Type 2 diabetes mellitus without complication, without long-term current use of insulin ([facility])    Lab Results   Component Value Date    HGBA1C 7.8 (H) 2024     She is on week 6 of ozempic 0.5 mg  She reports that at time she has some mild nausea but has not needed to take any Reglan  Discussed that she will start taking 1mg for four weeks start on .  Discussed that if she tolerates the 1mg dose, then we will plan to increase to 2 mg after 4 weeks  She will continue with Reglan as needed  Dicussed the use of imodium if she develops diarrhea         Fatigue, unspecified type  Likely multifactorial  On average she has only been wearing her CPAP machine for 4 hours a night ,due to it being uncomfortable  Discussed that she will try to increase it to 6 hours  Will repeat vitamin D level given hx of vitamin D deficiency  Will follow TSH and T4       Essential hypertension       BP in office elevated at 150/72  Home medication includes Lotrel 5-40 mg daily  Dicussed that she will keep a log of her BP for the next weeks to determine if she needs adjustment in her antihypertensive         History of Present Illness     HPI  Ms. Villafana is a 64 year old female with a history T2Dm, HTN, Depression and sleep apnea who presents for follow up. She has been taking ozempic currently at 0.5 mg weekly. At her lost follow visit we decided the keep her on ozempic 0.5mg for 8 week instead of 4 weeks due to nausea. She states that she has  been tolerating the current dose will minimal nausea. She has not taken any Reglan. She reports that the itching of her skin has since resolved. She reports ongoing fatigue that she thinks has gotten a little worse over the past few weeks.  Review of Systems   Constitutional:  Positive for fatigue. Negative for chills and fever.   HENT:  Negative for ear pain and sore throat.    Eyes:  Negative for pain and visual disturbance.   Respiratory:  Negative for cough and shortness of breath.    Cardiovascular:  Negative for chest pain and palpitations.   Gastrointestinal:  Negative for abdominal pain and vomiting.   Genitourinary:  Negative for dysuria and hematuria.   Musculoskeletal:  Negative for arthralgias and back pain.   Skin:  Negative for color change and rash.   Neurological:  Negative for seizures and syncope.   All other systems reviewed and are negative.    Past Medical History:   Diagnosis Date    Acute respiratory failure with hypoxia (HCC) 08/21/2021    Annual physical exam 03/22/2021    Chills, diarrhea, fatigue, back pain 06/03/2022    COVID-19 08/18/2021    Depression     Diabetes mellitus ([facility])     Hypertension     Malignant melanoma of face ([facility]) 03/22/2021    Pneumonia August 2031    Pneumonia due to COVID-19 virus 08/18/2021    Last Assessment & Plan:     · Tested positive for COVID-19 8/17/2021.  -[facility]    · Positive exposure from son.      · Symptoms are unchanged.  She continues to experience headache, chills, sweats, nausea, poor appetite and fatigue.  With occasional cough.    · However, she reports that her pulse ox today was noted at 80 but improved with deep breaths. 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    Rash 10/05/2022    Sleep apnea, obstructive      Past Surgical History:   Procedure Laterality Date    BREAST BIOPSY Right 2016    Done at [facility]    WISDOM TOOTH EXTRACTION       Family History   Problem Relation Age of Onset     Heart disease Mother     Diabetes Mother     Liver cancer Mother     Kidney cancer Mother     Cancer Mother     Thyroid disease Mother     Heart disease Father     Colon cancer Father     Skin cancer Father     Cancer Father     Stroke Brother      Social History     Tobacco Use    Smoking status: Former     Current packs/day: 0.00     Average packs/day: 1 pack/day for 30.0 years (30.0 ttl pk-yrs)     Types: Cigarettes     Start date: 1979     Quit date: 2009     Years since quitting: 15.7    Smokeless tobacco: Never   Vaping Use    Vaping status: Never Used   Substance and Sexual Activity    Alcohol use: Yes     Comment: socially    Drug use: Not Currently     Types: Marijuana     Comment: Has not used in a long time(edible)    Sexual activity: Not Currently     Partners: Male     Current Outpatient Medications on File Prior to Visit   Medication Sig    amLODIPine-benazepril (LOTREL) 5-40 MG per capsule Take 1 capsule by mouth daily    atorvastatin (LIPITOR) 40 mg tablet Take 1 tablet (40 mg total) by mouth daily    calcium carbonate (OS-DENISSE) 600 MG tablet Take 600 mg by mouth 2 (two) times a day with meals    cholecalciferol (VITAMIN D3) 1,000 units tablet Take 1,000 Units by mouth daily (Patient not taking: Reported on 8/30/2024)    DULoxetine (CYMBALTA) 20 mg capsule TAKE 1 CAPSULE(20 MG) BY MOUTH DAILY    ergocalciferol (VITAMIN D2) 50,000 units Take 1 capsule (50,000 Units total) by mouth once a week for 8 doses (Patient not taking: Reported on 8/30/2024)    loratadine (CLARITIN) 10 mg tablet Take 1 tablet (10 mg total) by mouth daily for 14 days    Melatonin 10 MG CAPS Take 10 mg by mouth daily at bedtime as needed As needed (Patient not taking: Reported on 8/30/2024)    metFORMIN (GLUCOPHAGE-XR) 500 mg 24 hr tablet Take 2 tablets (1,000 mg total) by mouth 2 (two) times a day with meals    Multiple Vitamin (multivitamin) tablet Take 1 tablet by mouth daily    semaglutide, 0.25 or 0.5 mg/dose, (Ozempic, 0.25  or 0.5 MG/DOSE,) 2 mg/3 mL injection pen INJECT 0.25 MG UNDER THE SKIN EVERY 7 DAYS FOR 5 DOSES.     Allergies   Allergen Reactions    Azithromycin Itching and Rash     Immunization History   Administered Date(s) Administered    COVID-19 PFIZER VACCINE 0.3 ML IM 03/27/2021, 04/17/2021, 12/11/2021    COVID-19 Pfizer vac (Frank-sucrose, gray cap) 12 yr+ IM 05/18/2022    Influenza, recombinant, quadrivalent,injectable, preservative free 10/15/2021    Pneumococcal Conjugate Vaccine 20-valent (Pcv20), Polysace 07/25/2024    Pneumococcal Polysaccharide PPV23 02/22/2021    Tdap 09/14/2021    Zoster Vaccine Recombinant 06/20/2024     Objective     Pulse 82   Temp (!) 97.3 °F (36.3 °C)   Resp 20   Wt 131 kg (289 lb)   SpO2 93%   BMI 52.86 kg/m²     Physical Exam  Vitals and nursing note reviewed.   Constitutional:       General: She is not in acute distress.     Appearance: She is well-developed.   HENT:      Head: Normocephalic and atraumatic.   Eyes:      Conjunctiva/sclera: Conjunctivae normal.   Cardiovascular:      Rate and Rhythm: Normal rate and regular rhythm.      Heart sounds: No murmur heard.  Pulmonary:      Effort: Pulmonary effort is normal. No respiratory distress.      Breath sounds: Normal breath sounds.   Abdominal:      Palpations: Abdomen is soft.      Tenderness: There is no abdominal tenderness.   Musculoskeletal:         General: No swelling.      Cervical back: Neck supple.   Skin:     General: Skin is warm and dry.      Capillary Refill: Capillary refill takes less than 2 seconds.   Neurological:      Mental Status: She is alert.   Psychiatric:         Mood and Affect: Mood normal.

## 2024-10-04 NOTE — ASSESSMENT & PLAN NOTE
BP in office elevated at 150/72  Home medication includes Lotrel 5-40 mg daily  Dicussed that she will keep a log of her BP for the next weeks to determine if she needs adjustment in her antihypertensive

## 2024-10-14 DIAGNOSIS — I10 ESSENTIAL HYPERTENSION: ICD-10-CM

## 2024-10-15 RX ORDER — AMLODIPINE AND BENAZEPRIL HYDROCHLORIDE 5; 40 MG/1; MG/1
1 CAPSULE ORAL DAILY
Qty: 90 CAPSULE | Refills: 1 | Status: SHIPPED | OUTPATIENT
Start: 2024-10-15

## 2024-12-07 ENCOUNTER — APPOINTMENT (OUTPATIENT)
Dept: LAB | Facility: CLINIC | Age: 64
End: 2024-12-07
Payer: COMMERCIAL

## 2024-12-07 DIAGNOSIS — E55.9 HYPOVITAMINOSIS D: ICD-10-CM

## 2024-12-07 DIAGNOSIS — E11.9 TYPE 2 DIABETES MELLITUS WITHOUT COMPLICATION, WITHOUT LONG-TERM CURRENT USE OF INSULIN (HCC): ICD-10-CM

## 2024-12-07 DIAGNOSIS — R79.89 ABNORMAL TSH: ICD-10-CM

## 2024-12-07 LAB
25(OH)D3 SERPL-MCNC: 32.2 NG/ML (ref 30–100)
EST. AVERAGE GLUCOSE BLD GHB EST-MCNC: 140 MG/DL
HBA1C MFR BLD: 6.5 %
TSH SERPL DL<=0.05 MIU/L-ACNC: 3.36 UIU/ML (ref 0.45–4.5)

## 2024-12-07 PROCEDURE — 36415 COLL VENOUS BLD VENIPUNCTURE: CPT

## 2024-12-07 PROCEDURE — 84443 ASSAY THYROID STIM HORMONE: CPT

## 2024-12-07 PROCEDURE — 83036 HEMOGLOBIN GLYCOSYLATED A1C: CPT

## 2024-12-07 PROCEDURE — 86376 MICROSOMAL ANTIBODY EACH: CPT

## 2024-12-07 PROCEDURE — 82306 VITAMIN D 25 HYDROXY: CPT

## 2024-12-09 ENCOUNTER — OFFICE VISIT (OUTPATIENT)
Dept: INTERNAL MEDICINE CLINIC | Facility: CLINIC | Age: 64
End: 2024-12-09

## 2024-12-09 VITALS
HEIGHT: 62 IN | WEIGHT: 291 LBS | TEMPERATURE: 97.5 F | OXYGEN SATURATION: 94 % | DIASTOLIC BLOOD PRESSURE: 78 MMHG | BODY MASS INDEX: 53.55 KG/M2 | HEART RATE: 77 BPM | SYSTOLIC BLOOD PRESSURE: 126 MMHG | RESPIRATION RATE: 18 BRPM

## 2024-12-09 DIAGNOSIS — R79.89 ELEVATED TSH: Primary | ICD-10-CM

## 2024-12-09 DIAGNOSIS — I10 ESSENTIAL HYPERTENSION: ICD-10-CM

## 2024-12-09 DIAGNOSIS — E66.01 MORBID OBESITY (HCC): ICD-10-CM

## 2024-12-09 DIAGNOSIS — E11.9 TYPE 2 DIABETES MELLITUS WITHOUT COMPLICATION, WITHOUT LONG-TERM CURRENT USE OF INSULIN (HCC): ICD-10-CM

## 2024-12-10 LAB — THYROPEROXIDASE AB SERPL-ACNC: 33 IU/ML (ref 0–34)

## 2024-12-10 NOTE — PROGRESS NOTES
Name: Ana Gonzalez      : 1960      MRN: [id number]  Encounter Provider: Lydia Joy MD  Encounter Date: 2024   Encounter department: [facility] [place]  :  Assessment & Plan  Elevated TSH  Lab Results   Component Value Date    [id number] 3.364 2024    FREET4 0.81 2024   TPO pending  No Treatment indication at this time         Morbid obesity (HCC)  No significant changes to her weight since starting Ozempic with gradual escalation.  Currently on 1 mg weekly  Reports improving her diet, no structured physical exercise.  Has a treadmill  Not following up with weight loss management team    Plan  Will increase Ozempic to 2 mg weekly-patient currently has 4 doses of Ozempic 1 mg weekly.  Will reach out prior to expiration.  Courage physical activity, moderate intensity 30 to 45 minutes daily  Encouraged adequate diet , low carbohydrate, increase vegetables/protein  and fruits.         Type 2 diabetes mellitus without complication, without long-term current use of insulin (HCC)    Lab Results   Component Value Date    HGBA1C 6.5 (H) 2024   Does not check her blood sugars at home  Reports improvement in diet more fruits and vegetables  Has a treadmill at home, trying to work out  Continue metformin 1000 mg twice daily  Ozempic increase to 1mg weekly- plan to increase to 2 mg mostly for weight loss management  Continue atorvastatin 40  Follow-up with ophthalmology         Essential hypertension  Blood pressure at goal 126/78  Continue Lotrel 5-40 daily                History of Present Illness     HPI  Patient presents today for follow up of regular chronic problems as well as discuss FMLA  She is caring of sick  and son at home who have chronic illness. Will need time to attend to them as well as be able to go for hospital appointment  She reports the last couple of weeks have been very diffcicult at home managing sick family at home. Her  "davon was in rehab and just recently dicharged     She would like to get intermittent FMLA-will discuss dates times per her necessity.  Form will be filled and faxed to her insurance  At this visit also reviewed blood work, current medications  No significant weight changes on Ozempic 1 mg, we discussed increasing the dose to 2 mg.  She will return to clinic once Ozempic 1 mg is about to .    RTC IN 2months  Review of Systems   Constitutional:  Negative for chills and fever.   HENT:  Negative for ear pain and sore throat.    Eyes:  Negative for pain and visual disturbance.   Respiratory:  Negative for cough and shortness of breath.    Cardiovascular:  Negative for chest pain and palpitations.   Gastrointestinal:  Negative for abdominal pain and vomiting.   Genitourinary:  Negative for dysuria and hematuria.   Musculoskeletal:  Negative for arthralgias and back pain.   Skin:  Negative for color change and rash.   Neurological:  Negative for seizures and syncope.   All other systems reviewed and are negative.      Objective   /78 (BP Location: Left arm, Patient Position: Sitting, Cuff Size: Large)   Pulse 77   Temp 97.5 °F (36.4 °C) (Tympanic)   Resp 18   Ht 5' 2\" (1.575 m)   Wt 132 kg (291 lb)   SpO2 94%   BMI 53.22 kg/m²      Physical Exam  Vitals and nursing note reviewed.   Constitutional:       General: She is not in acute distress.     Appearance: She is well-developed. She is obese.   HENT:      Head: Normocephalic and atraumatic.   Eyes:      Conjunctiva/sclera: Conjunctivae normal.   Cardiovascular:      Rate and Rhythm: Normal rate and regular rhythm.      Heart sounds: No murmur heard.  Pulmonary:      Effort: Pulmonary effort is normal. No respiratory distress.      Breath sounds: Normal breath sounds.   Abdominal:      Palpations: Abdomen is soft.      Tenderness: There is no abdominal tenderness.   Musculoskeletal:         General: No swelling.      Cervical back: Neck supple. "   Skin:     General: Skin is warm and dry.      Capillary Refill: Capillary refill takes less than 2 seconds.   Neurological:      Mental Status: She is alert.   Psychiatric:         Mood and Affect: Mood normal.

## 2024-12-10 NOTE — ASSESSMENT & PLAN NOTE
Lab Results   Component Value Date    HGBA1C 6.5 (H) 12/07/2024   Does not check her blood sugars at home  Reports improvement in diet more fruits and vegetables  Has a treadmill at home, trying to work out  Continue metformin 1000 mg twice daily  Ozempic increase to 1mg weekly- plan to increase to 2 mg mostly for weight loss management  Continue atorvastatin 40  Follow-up with ophthalmology

## 2024-12-10 NOTE — ASSESSMENT & PLAN NOTE
Lab Results   Component Value Date    [id number] 3.364 12/07/2024    FREET4 0.81 05/22/2024   TPO pending  No Treatment indication at this time

## 2025-01-29 DIAGNOSIS — F32.0 CURRENT MILD EPISODE OF MAJOR DEPRESSIVE DISORDER WITHOUT PRIOR EPISODE (HCC): ICD-10-CM

## 2025-01-29 RX ORDER — DULOXETIN HYDROCHLORIDE 20 MG/1
20 CAPSULE, DELAYED RELEASE ORAL DAILY
Qty: 30 CAPSULE | Refills: 5 | Status: SHIPPED | OUTPATIENT
Start: 2025-01-29

## 2025-02-17 ENCOUNTER — OFFICE VISIT (OUTPATIENT)
Dept: INTERNAL MEDICINE CLINIC | Facility: CLINIC | Age: 65
End: 2025-02-17
Payer: COMMERCIAL

## 2025-02-17 VITALS
TEMPERATURE: 97.9 F | SYSTOLIC BLOOD PRESSURE: 138 MMHG | RESPIRATION RATE: 16 BRPM | OXYGEN SATURATION: 96 % | DIASTOLIC BLOOD PRESSURE: 80 MMHG | WEIGHT: 290 LBS | HEIGHT: 62 IN | BODY MASS INDEX: 53.37 KG/M2 | HEART RATE: 89 BPM

## 2025-02-17 DIAGNOSIS — I10 PRIMARY HYPERTENSION: ICD-10-CM

## 2025-02-17 DIAGNOSIS — E11.9 TYPE 2 DIABETES MELLITUS WITHOUT COMPLICATION, WITHOUT LONG-TERM CURRENT USE OF INSULIN (HCC): Primary | ICD-10-CM

## 2025-02-17 DIAGNOSIS — E66.01 MORBID OBESITY (HCC): ICD-10-CM

## 2025-02-17 DIAGNOSIS — F32.0 CURRENT MILD EPISODE OF MAJOR DEPRESSIVE DISORDER WITHOUT PRIOR EPISODE (HCC): ICD-10-CM

## 2025-02-17 PROCEDURE — 99214 OFFICE O/P EST MOD 30 MIN: CPT

## 2025-02-17 RX ORDER — DULOXETIN HYDROCHLORIDE 30 MG/1
30 CAPSULE, DELAYED RELEASE ORAL DAILY
Qty: 30 CAPSULE | Refills: 0 | Status: SHIPPED | OUTPATIENT
Start: 2025-02-17 | End: 2025-03-19

## 2025-02-17 RX ORDER — SEMAGLUTIDE 2.4 MG/.75ML
INJECTION, SOLUTION SUBCUTANEOUS
Qty: 3 ML | Refills: 0 | Status: CANCELLED | OUTPATIENT
Start: 2025-02-17

## 2025-02-18 NOTE — ASSESSMENT & PLAN NOTE
Lab Results   Component Value Date    HGBA1C 6.5 (H) 12/07/2024     Home regimene- metformin 1000 mg bid  Also on weekly SC semaglutide 1 mg subcutaneously  Advised to follow-up with eye exam  Continue atorvastatin, Lotrel  Will obtain blood work at the next visit  Orders:    Tirzepatide 2.5 MG/0.5ML SOAJ; Inject 2.5 mg under the skin every 7 days for 4 doses

## 2025-02-18 NOTE — ASSESSMENT & PLAN NOTE
Denies any SI intent or attemp  We discussed increasing Duloxetine to 30 mg   Encouraged psychotherapy.  Previously given website psychology today.  She states nephew has benefited from therapy on that website.  Encouraged her to make an appointment for virtual visits with a psychologist  RTC in one month to assess chnages    Orders:    DULoxetine (CYMBALTA) 30 mg delayed release capsule; Take 1 capsule (30 mg total) by mouth daily

## 2025-02-18 NOTE — ASSESSMENT & PLAN NOTE
Blood pressure accepted - contine Lotrel 5-40 daily  Continue to keep a blood pressure log  DASH diet Encouraged

## 2025-02-18 NOTE — ASSESSMENT & PLAN NOTE
Morbid obesity associated times and diabetes  Has been on semaglutide for the past 6 months without significant results  Was switched to 30 bedside for indications of morbid obesity with type 2 diabetes and ANTONETTE  Encourage low carbohydrate diet, diet low in saturated fat  Encouraged exercise once able to  Orders:    Tirzepatide 2.5 MG/0.5ML SOAJ; Inject 2.5 mg under the skin every 7 days for 4 doses

## 2025-02-18 NOTE — PROGRESS NOTES
Name: Ana Gonzalez      : 1960      MRN: [id number]  Encounter Provider: Lydia Joy MD  Encounter Date: 2025   Encounter department: [facility] INTERNAL MEDICINE [place]  :  Assessment & Plan  Type 2 diabetes mellitus without complication, without long-term current use of insulin (HCC)    Lab Results   Component Value Date    HGBA1C 6.5 (H) 2024     Home regimene- metformin 1000 mg bid  Also on weekly SC semaglutide 1 mg subcutaneously  Advised to follow-up with eye exam  Continue atorvastatin, Lotrel  Will obtain blood work at the next visit  Orders:    Tirzepatide 2.5 MG/0.5ML SOAJ; Inject 2.5 mg under the skin every 7 days for 4 doses    Morbid obesity (HCC)      Morbid obesity associated times and diabetes  Has been on semaglutide for the past 6 months without significant results  Was switched to 30 bedside for indications of morbid obesity with type 2 diabetes and ANTONETTE  Encourage low carbohydrate diet, diet low in saturated fat  Encouraged exercise once able to  Orders:    Tirzepatide 2.5 MG/0.5ML SOAJ; Inject 2.5 mg under the skin every 7 days for 4 doses    Current mild episode of major depressive disorder without prior episode (HCC)  Denies any SI intent or attemp  We discussed increasing Duloxetine to 30 mg   Encouraged psychotherapy.  Previously given website psychology today.  She states nephew has benefited from therapy on that website.  Encouraged her to make an appointment for virtual visits with a psychologist  RTC in one month to assess chnages    Orders:    DULoxetine (CYMBALTA) 30 mg delayed release capsule; Take 1 capsule (30 mg total) by mouth daily    Primary hypertension  Blood pressure accepted - contine Lotrel 5-40 daily  Continue to keep a blood pressure log  DASH diet Encouraged              History of Present Illness   HPI   Ana Gonzalez is a 62 y.o. female with a PMHX of DM2, HLD, HTN, ANTONETTE who presents today for follow up of chronic  "conditions    She has no new complaint  Patient has been out of work for at least one month caring for her son with MS. He was hospitalized due to aspiration PNA and required Vent management, now has tracheostomy and currently in LTACH  For Ana, It has been a roller coaster of events since December. She acknowledges being overwhelmed, feels the need to talk. She feels anxious about her son's health, but is hopefull.She denies worsening depression , suicide ideas or intents or attempt. She takes Duloxetine 20 mg at bedtime which has been been helping . She reports frequent waking at night and difficult to maintain sleep once she has awoken    For her weight and obesity as per last discussion will switch to Zepbound\Tirzipatide which will have benefits for Diabetes, weight loss and ANTONETTE    Reviewed blood work from December 2024, no major abnormalities    We discussed taking melatonin to possibly improve sleep and maintain sleep. Recommended increasing Duloxetine to 30 mg  Also discussed including psychotherapy counseling. Patient is agreeble to above plan and will reach out with questions    Return to clinic in 1 month-review of weight management and sleep pattern    Review of Systems    Objective   /80 (BP Location: Left arm, Patient Position: Sitting, Cuff Size: Large)   Pulse 89   Temp 97.9 °F (36.6 °C) (Tympanic)   Resp 16   Ht 5' 2\" (1.575 m)   Wt 132 kg (290 lb)   SpO2 96%   BMI 53.04 kg/m²      Physical Exam  Vitals and nursing note reviewed.   Constitutional:       General: She is not in acute distress.     Appearance: She is well-developed. She is obese.   HENT:      Head: Normocephalic and atraumatic.   Eyes:      Conjunctiva/sclera: Conjunctivae normal.   Cardiovascular:      Rate and Rhythm: Normal rate and regular rhythm.      Heart sounds: No murmur heard.  Pulmonary:      Effort: Pulmonary effort is normal. No respiratory distress.      Breath sounds: Normal breath sounds.   Abdominal:     "  Palpations: Abdomen is soft.      Tenderness: There is no abdominal tenderness.   Musculoskeletal:         General: No swelling.      Cervical back: Neck supple.   Skin:     General: Skin is warm and dry.      Capillary Refill: Capillary refill takes less than 2 seconds.   Neurological:      Mental Status: She is alert.   Psychiatric:         Mood and Affect: Mood normal.

## 2025-03-07 DIAGNOSIS — I10 ESSENTIAL HYPERTENSION: ICD-10-CM

## 2025-03-07 RX ORDER — AMLODIPINE AND BENAZEPRIL HYDROCHLORIDE 5; 40 MG/1; MG/1
1 CAPSULE ORAL DAILY
Qty: 90 CAPSULE | Refills: 1 | Status: SHIPPED | OUTPATIENT
Start: 2025-03-07

## 2025-03-13 ENCOUNTER — TELEPHONE (OUTPATIENT)
Dept: INTERNAL MEDICINE CLINIC | Facility: CLINIC | Age: 65
End: 2025-03-13

## 2025-03-24 ENCOUNTER — OFFICE VISIT (OUTPATIENT)
Dept: INTERNAL MEDICINE CLINIC | Facility: CLINIC | Age: 65
End: 2025-03-24
Payer: COMMERCIAL

## 2025-03-24 VITALS
TEMPERATURE: 98.6 F | BODY MASS INDEX: 53.92 KG/M2 | WEIGHT: 293 LBS | HEIGHT: 62 IN | HEART RATE: 78 BPM | OXYGEN SATURATION: 96 % | SYSTOLIC BLOOD PRESSURE: 138 MMHG | DIASTOLIC BLOOD PRESSURE: 84 MMHG | RESPIRATION RATE: 16 BRPM

## 2025-03-24 DIAGNOSIS — F32.1 CURRENT MODERATE EPISODE OF MAJOR DEPRESSIVE DISORDER WITHOUT PRIOR EPISODE (HCC): ICD-10-CM

## 2025-03-24 DIAGNOSIS — E11.9 TYPE 2 DIABETES MELLITUS WITHOUT COMPLICATION, WITHOUT LONG-TERM CURRENT USE OF INSULIN (HCC): ICD-10-CM

## 2025-03-24 DIAGNOSIS — I10 ESSENTIAL HYPERTENSION: ICD-10-CM

## 2025-03-24 DIAGNOSIS — I10 PRIMARY HYPERTENSION: Primary | ICD-10-CM

## 2025-03-24 DIAGNOSIS — G47.33 OSA (OBSTRUCTIVE SLEEP APNEA): ICD-10-CM

## 2025-03-24 PROCEDURE — 99213 OFFICE O/P EST LOW 20 MIN: CPT

## 2025-03-24 RX ORDER — TIRZEPATIDE 5 MG/.5ML
5 INJECTION, SOLUTION SUBCUTANEOUS WEEKLY
Qty: 2 ML | Refills: 0 | Status: SHIPPED | OUTPATIENT
Start: 2025-03-24 | End: 2025-03-27 | Stop reason: CLARIF

## 2025-03-24 RX ORDER — TIRZEPATIDE 2.5 MG/.5ML
INJECTION, SOLUTION SUBCUTANEOUS
COMMUNITY
Start: 2025-02-26 | End: 2025-03-24

## 2025-03-25 NOTE — ASSESSMENT & PLAN NOTE
Lab Results   Component Value Date    HGBA1C 6.5 (H) 12/07/2024     Discussed with patient that we will increase her Mounjaro to 5 mg once a week  Continue with lifestyle modification with diet and exercise   Will check patient's weight at her next follow up  Orders:    Tirzepatide (Mounjaro) 5 MG/0.5ML SOAJ; Inject 5 mg under the skin once a week

## 2025-03-25 NOTE — ASSESSMENT & PLAN NOTE
Patient referred to pulmonology given that her BiPAP machine recently malfunctioned  Orders:    Ambulatory Referral to Pulmonology; Future

## 2025-03-25 NOTE — PROGRESS NOTES
Name: Ana Gonzalez      : 1960      MRN: [id number]  Encounter Provider: Nelly Tompkins MD  Encounter Date: 3/24/2025   Encounter department: [facility] INTERNAL MEDICINE [place]    Assessment & Plan  Type 2 diabetes mellitus without complication, without long-term current use of insulin (HCC)    Lab Results   Component Value Date    HGBA1C 6.5 (H) 2024     Discussed with patient that we will increase her Mounjaro to 5 mg once a week  Continue with lifestyle modification with diet and exercise   Will check patient's weight at her next follow up  Orders:    Tirzepatide (Mounjaro) 5 MG/0.5ML SOAJ; Inject 5 mg under the skin once a week    Primary hypertension  Continue Lotrel        ANTONETTE (obstructive sleep apnea)  Patient referred to pulmonology given that her BiPAP machine recently malfunctioned  Orders:    Ambulatory Referral to Pulmonology; Future    Current moderate episode of major depressive disorder without prior episode ([facility])  Reports improvement with duloxetine 30 mg HS  Continue current medication               History of Present Illness     HPI  Ms. Gonzalez presents for a follow up visit. She has been on mounjaro 2.5 mg weekly for the past four weeks and has been tolerating it well with minimal nausea. She has been taking duloxetine 30 mg daily and thinks that the increased dose has been helpful. She has not noticed any weight loss since switching to Tirzepatide.     Review of Systems   Constitutional:  Negative for chills and fever.   HENT:  Negative for ear pain and sore throat.    Eyes:  Negative for pain and visual disturbance.   Respiratory:  Negative for cough and shortness of breath.    Cardiovascular:  Negative for chest pain and palpitations.   Gastrointestinal:  Negative for abdominal pain and vomiting.   Genitourinary:  Negative for dysuria and hematuria.   Musculoskeletal:  Negative for arthralgias and back pain.   Skin:  Negative for color change and rash.    Neurological:  Negative for seizures and syncope.   All other systems reviewed and are negative.    Past Medical History:   Diagnosis Date    Acute respiratory failure with hypoxia (HCC) 2021    Annual physical exam 2021    Chills, diarrhea, fatigue, back pain 2022    COVID-19 2021    Depression     Diabetes mellitus (HCC)     Hypertension     Malignant melanoma of face (HCC) 2021    Pneumonia 2031    Pneumonia due to COVID-19 virus 2021    Last Assessment & Plan:     · Tested positive for COVID-19 2021.  [facility]    · Positive exposure from son.      · Symptoms are unchanged.  She continues to experience headache, chills, sweats, nausea, poor appetite and fatigue.  With occasional cough.    · However, she reports that her pulse ox today was noted at 80 but improved with deep breaths. She does endorse shortness of breath w    Post-COVID-19 condition 2021    Rash 10/05/2022    Sleep apnea, obstructive      Past Surgical History:   Procedure Laterality Date    BREAST BIOPSY Right 2016    Done at [facility]    WISDOM TOOTH EXTRACTION       Family History   Problem Relation Age of Onset    Heart disease Mother     Diabetes Mother     Liver cancer Mother     Kidney cancer Mother     Cancer Mother     Thyroid disease Mother     Heart disease Father     Colon cancer Father     Skin cancer Father     Cancer Father     Stroke Brother      Social History     Tobacco Use    Smoking status: Former     Current packs/day: 0.00     Average packs/day: 1 pack/day for 30.0 years (30.0 ttl pk-yrs)     Types: Cigarettes     Start date:      Quit date: 2009     Years since quittin.2     Passive exposure: Past    Smokeless tobacco: Never   Vaping Use    Vaping status: Never Used   Substance and Sexual Activity    Alcohol use: Yes     Comment: socially    Drug use: Not Currently     Types: Marijuana     Comment: Has not used in a long time(edible)    Sexual  "activity: Not Currently     Partners: Male     Current Outpatient Medications on File Prior to Visit   Medication Sig    amLODIPine-benazepril (LOTREL) 5-40 MG per capsule TAKE 1 CAPSULE BY MOUTH DAILY    atorvastatin (LIPITOR) 40 mg tablet Take 1 tablet (40 mg total) by mouth daily    calcium carbonate (OS-DENISSE) 600 MG tablet Take 600 mg by mouth 2 (two) times a day with meals    cholecalciferol (VITAMIN D3) 1,000 units tablet Take 1,000 Units by mouth daily    DULoxetine (CYMBALTA) 30 mg delayed release capsule Take 1 capsule (30 mg total) by mouth daily    metFORMIN (GLUCOPHAGE-XR) 500 mg 24 hr tablet Take 2 tablets (1,000 mg total) by mouth 2 (two) times a day with meals    Multiple Vitamin (multivitamin) tablet Take 1 tablet by mouth daily     Allergies   Allergen Reactions    Azithromycin Itching and Rash     Immunization History   Administered Date(s) Administered    COVID-19 PFIZER VACCINE 0.3 ML IM 03/27/2021, 04/17/2021, 12/11/2021    COVID-19 Pfizer vac (Frank-sucrose, gray cap) 12 yr+ IM 05/18/2022    Influenza, recombinant, quadrivalent,injectable, preservative free 10/15/2021    Pneumococcal Conjugate Vaccine 20-valent (Pcv20), Polysace 07/25/2024    Pneumococcal Polysaccharide PPV23 02/22/2021    Tdap 09/14/2021    Zoster Vaccine Recombinant 06/20/2024     Objective   /84 (BP Location: Right arm, Patient Position: Sitting, Cuff Size: Large)   Pulse 78   Temp 98.6 °F (37 °C) (Tympanic)   Resp 16   Ht 5' 2\" (1.575 m)   Wt 133 kg (294 lb)   SpO2 96%   BMI 53.77 kg/m²     Physical Exam  Vitals and nursing note reviewed.   Constitutional:       General: He is not in acute distress.     Appearance: He is well-developed.   HENT:      Head: Normocephalic and atraumatic.   Eyes:      Conjunctiva/sclera: Conjunctivae normal.   Cardiovascular:      Rate and Rhythm: Normal rate and regular rhythm.      Heart sounds: No murmur heard.  Pulmonary:      Effort: Pulmonary effort is normal. No respiratory " distress.      Breath sounds: Normal breath sounds.   Abdominal:      Palpations: Abdomen is soft.      Tenderness: There is no abdominal tenderness.   Musculoskeletal:         General: No swelling.      Cervical back: Neck supple.   Skin:     General: Skin is warm and dry.      Capillary Refill: Capillary refill takes less than 2 seconds.   Neurological:      Mental Status: He is alert.   Psychiatric:         Mood and Affect: Mood normal.

## 2025-03-27 ENCOUNTER — NURSE TRIAGE (OUTPATIENT)
Age: 65
End: 2025-03-27

## 2025-03-27 DIAGNOSIS — E11.9 TYPE 2 DIABETES MELLITUS WITHOUT COMPLICATION, WITHOUT LONG-TERM CURRENT USE OF INSULIN (HCC): ICD-10-CM

## 2025-03-27 RX ORDER — TIRZEPATIDE 5 MG/.5ML
5 INJECTION, SOLUTION SUBCUTANEOUS WEEKLY
Qty: 2 ML | Refills: 0 | Status: SHIPPED | OUTPATIENT
Start: 2025-03-27

## 2025-03-27 NOTE — TELEPHONE ENCOUNTER
"FOLLOW UP: Patient's prescription sent to [facility] pharmacy as requested    REASON FOR CONVERSATION: Medication Problem  DISPOSITION: Home Care    Reason for Disposition   Prescription prescribed recently is not at pharmacy and triager has access to patient's EMR and prescription is recorded in the EMR    Answer Assessment - Initial Assessment Questions  1. NAME of MEDICINE: \"What medicine(s) are you calling about?\"      Tirzepatide  2. QUESTION: \"What is your question?\" (e.g., double dose of medicine, side effect)      Please send to [facility] #[id number]  3. PRESCRIBER: \"Who prescribed the medicine?\" Reason: if prescribed by specialist, call should be referred to that group.      PCP    Protocols used: Medication Question Call-Adult-[place]    "

## 2025-03-27 NOTE — TELEPHONE ENCOUNTER
Regarding: MEDICATION PROBLEM  ----- Message from Bonny CORREA sent at 3/27/2025  4:25 PM EDT -----  Patients medication     Tirzepatide (Mounjaro) 5 MG/0.5ML SOAJ [[id number]]    Was sent over to [facility].     Patient needs it to go too    [id number] - [facility] [place] - [address] [[id number]]

## 2025-04-03 DIAGNOSIS — F32.0 CURRENT MILD EPISODE OF MAJOR DEPRESSIVE DISORDER WITHOUT PRIOR EPISODE (HCC): ICD-10-CM

## 2025-04-03 RX ORDER — DULOXETIN HYDROCHLORIDE 30 MG/1
30 CAPSULE, DELAYED RELEASE ORAL DAILY
Qty: 30 CAPSULE | Refills: 0 | Status: SHIPPED | OUTPATIENT
Start: 2025-04-03

## 2025-04-21 ENCOUNTER — CONSULT (OUTPATIENT)
Dept: PULMONOLOGY | Facility: CLINIC | Age: 65
End: 2025-04-21
Payer: COMMERCIAL

## 2025-04-21 VITALS
DIASTOLIC BLOOD PRESSURE: 88 MMHG | WEIGHT: 293 LBS | BODY MASS INDEX: 53.59 KG/M2 | RESPIRATION RATE: 18 BRPM | SYSTOLIC BLOOD PRESSURE: 130 MMHG | TEMPERATURE: 97.7 F | HEART RATE: 81 BPM | OXYGEN SATURATION: 97 %

## 2025-04-21 DIAGNOSIS — I10 ESSENTIAL HYPERTENSION: ICD-10-CM

## 2025-04-21 DIAGNOSIS — G47.33 OSA (OBSTRUCTIVE SLEEP APNEA): Primary | ICD-10-CM

## 2025-04-21 DIAGNOSIS — E66.01 MORBID OBESITY (HCC): ICD-10-CM

## 2025-04-21 PROCEDURE — 99215 OFFICE O/P EST HI 40 MIN: CPT | Performed by: INTERNAL MEDICINE

## 2025-04-21 NOTE — PROGRESS NOTES
Name: Ana Gonzalez      : 1960      MRN: [id number]  Encounter Provider: Avery Tate MD  Encounter Date: 2025   Encounter department: [facility] PULMON[facility] [place]  :  Assessment & Plan  ANTONETTE (obstructive sleep apnea)  Mrs.Aimee Ross was diagnosed with obstructive sleep apnea of severe degree on 3/2/2022 on a diagnostic and therapeutic split-night study.  While her overall AHI was 44.3, her supine AHI was 84.9 and she desaturated to 65% and had significant time spent with oxygen saturation less than 90%.  She was titrated to a BiPAP of 17/13 cm of water during the same night though she continued to have few events at the final pressure.  She was subsequently started on auto BIPAP therapy and was using this regularly till a month back according to her.  She has been having problems with the mask and pressure.  Currently she has daytime sleepiness and tiredness.  Her sleep is disturbed.  Her Kistler sleepiness score is 10 out of 24.  She has not been getting BIPAP supplies.  She has not had a visit for than 2 years.  On clinical examination she was morbidly obese and had oropharyngeal crowding.  I have advised her to restart using the BiPAP again at the current settings.  [facility] is her DME.  She has been on auto BiPAP with maximum pressure of 15 and minimum pressure of 10 with pressure support of 4.  I have ordered a mask fit.  If she continues to have problems, she would need a retitration study.  I had a long discussion with her and have answered all her questions.  Orders:    Ambulatory Referral to Pulmonology    Mask fitting only; Future    Morbid obesity (HCC)  She is morbidly obese and understands the need for weight reduction.  Currently she is on treatment with Mounjaro.       Essential hypertension  Has history of hypertension and currently she is on treatment with amlodipine-benazepril.           History of Present Illness   HPI  Ana  Carlos is a 65 y.o. adult past medical history of obstructive sleep apnea, hypertension type 2 diabetes and morbid obesity who has come for evaluation of her CPAP therapy for sleep apnea.  She has not been using CPAP for more than a month now and feels sleepy and tired.  Her sleep is disturbed and she wakes up not refreshed.  She stated that her sleep has been disturbed as she is taking care of her son who has been diagnosed with multiple sclerosis.  Was diagnosed with severe obstructive sleep apnea on 3/2/2022 on a diagnostic and therapeutic overnight split-night study.  Her overall AHI was 44.3 and her supine AHI was 84.9.  She desaturated to 65% and she had significant time with oxygen saturation less than 90%.  During the same night she was titrated to BiPAP 17/13 cm of water, though she continued to have few events at the final pressure.  Subsequently started on auto BIPAP therapy.  She was tolerating BiPAP therapy well and was compliant with therapy and was getting clinical benefit from therapy.  However of late she is having problems with the mask and she is not able to tolerate either the mask or pressure.  Her latest CPAP compliance records showed poor CPAP compliance.  Her [place] sleepiness score is currently 10 out of 24.  She denied any significant shortness of breath cough or phlegm or wheeze or chest pain.  She is morbidly obese and understands the need for weight reduction.  Currently on treatment with Mounjaro.  For her hypertension she has been on treatment with amlodipine benazepril.      Review of Systems   Constitutional:  Negative for appetite change, chills, fatigue and fever.   HENT:  Negative for hearing loss, rhinorrhea, sneezing, sore throat and trouble swallowing.    Respiratory:  Negative for cough, chest tightness, shortness of breath and wheezing.    Cardiovascular:  Positive for leg swelling. Negative for chest pain and palpitations.   Gastrointestinal:  Negative for abdominal  pain, constipation, diarrhea, nausea and vomiting.   Genitourinary:  Negative for dysuria, frequency and urgency.   Musculoskeletal:  Positive for arthralgias. Negative for back pain and gait problem.   Skin:  Negative for rash.   Allergic/Immunologic: Negative for environmental allergies.   Neurological:  Negative for dizziness, seizures, syncope, light-headedness and headaches.   Psychiatric/Behavioral:  Positive for sleep disturbance. Negative for agitation and confusion. The patient is nervous/anxious.           Objective   /88 (BP Location: Left arm, Patient Position: Sitting)   Pulse 81   Temp 97.7 °F (36.5 °C) (Tympanic)   Resp 18   Wt 133 kg (293 lb)   SpO2 97%   BMI 53.59 kg/m²      Physical Exam  Vitals reviewed.   Constitutional:       General: He is not in acute distress.     Appearance: He is obese. He is not ill-appearing, toxic-appearing or diaphoretic.   HENT:      Head: Normocephalic.      Mouth/Throat:      Mouth: Mucous membranes are moist.      Comments: Oropharyngeal crowding  Eyes:      General: No scleral icterus.     Conjunctiva/sclera: Conjunctivae normal.   Cardiovascular:      Rate and Rhythm: Normal rate and regular rhythm.      Heart sounds: Normal heart sounds. No murmur heard.  Pulmonary:      Effort: Pulmonary effort is normal. No respiratory distress.      Breath sounds: Normal breath sounds. No stridor. No wheezing, rhonchi or rales.   Chest:      Chest wall: No tenderness.   Abdominal:      General: Bowel sounds are normal.      Palpations: Abdomen is soft.      Tenderness: There is no abdominal tenderness. There is no guarding.   Musculoskeletal:      Cervical back: Neck supple. No rigidity.      Right lower leg: No edema.      Left lower leg: No edema.   Lymphadenopathy:      Cervical: No cervical adenopathy.   Skin:     Coloration: Skin is not jaundiced or pale.      Findings: No rash.   Neurological:      Mental Status: He is alert and oriented to person, place, and  time.      Gait: Gait normal.   Psychiatric:         Mood and Affect: Mood normal.         Behavior: Behavior normal.         Thought Content: Thought content normal.         Judgment: Judgment normal.       I spent 45 minutes of time taking care of this patient who is coming for a follow-up after a long time, I am seeing for the first time.  The majority of this time was spent directly with the patient counseling as well as coordinating care..

## 2025-04-21 NOTE — ASSESSMENT & PLAN NOTE
She is morbidly obese and understands the need for weight reduction.  Currently she is on treatment with Mounjaro.

## 2025-04-21 NOTE — ASSESSMENT & PLAN NOTE
Mrs.Aimee Ross was diagnosed with obstructive sleep apnea of severe degree on 3/2/2022 on a diagnostic and therapeutic split-night study.  While her overall AHI was 44.3, her supine AHI was 84.9 and she desaturated to 65% and had significant time spent with oxygen saturation less than 90%.  She was titrated to a BiPAP of 17/13 cm of water during the same night though she continued to have few events at the final pressure.  She was subsequently started on auto BIPAP therapy and was using this regularly till a month back according to her.  She has been having problems with the mask and pressure.  Currently she has daytime sleepiness and tiredness.  Her sleep is disturbed.  Her [place] sleepiness score is 10 out of 24.  She has not been getting BIPAP supplies.  She has not had a visit for than 2 years.  On clinical examination she was morbidly obese and had oropharyngeal crowding.  I have advised her to restart using the BiPAP again at the current settings.  The [facility] is her DME.  She has been on auto BiPAP with maximum pressure of 15 and minimum pressure of 10 with pressure support of 4.  I have ordered a mask fit.  If she continues to have problems, she would need a retitration study.  I had a long discussion with her and have answered all her questions.  Orders:    Ambulatory Referral to Pulmonology    Mask fitting only; Future

## 2025-04-28 ENCOUNTER — OFFICE VISIT (OUTPATIENT)
Dept: INTERNAL MEDICINE CLINIC | Facility: CLINIC | Age: 65
End: 2025-04-28
Payer: COMMERCIAL

## 2025-04-28 VITALS
WEIGHT: 292.6 LBS | SYSTOLIC BLOOD PRESSURE: 170 MMHG | OXYGEN SATURATION: 96 % | HEIGHT: 63 IN | HEART RATE: 85 BPM | BODY MASS INDEX: 51.84 KG/M2 | TEMPERATURE: 98.6 F | DIASTOLIC BLOOD PRESSURE: 82 MMHG

## 2025-04-28 DIAGNOSIS — G56.02 CARPAL TUNNEL SYNDROME OF LEFT WRIST: Primary | ICD-10-CM

## 2025-04-28 DIAGNOSIS — E66.01 MORBID OBESITY (HCC): ICD-10-CM

## 2025-04-28 PROCEDURE — 99213 OFFICE O/P EST LOW 20 MIN: CPT

## 2025-04-28 RX ORDER — TIRZEPATIDE 7.5 MG/.5ML
7.5 INJECTION, SOLUTION SUBCUTANEOUS WEEKLY
Qty: 6.5 ML | Refills: 0 | Status: SHIPPED | OUTPATIENT
Start: 2025-04-28

## 2025-04-29 NOTE — PROGRESS NOTES
INTERNAL MEDICINE FOLLOW-UP OFFICE VISIT  [facility] INTERNAL MEDICINE [place]    NAME: Ana Gonzalez  AGE: 65 y.o. SEX: adult  : 1960     DATE: 2025     Assessment and Plan:     1. Carpal tunnel syndrome of left wrist (Primary)  Instructed patient to starting wear a splint, especially at night time   If no improvement discussed options such corticosteroid injection vs surgery by orthopedic surgeon  - Splint    2. Morbid obesity (HCC)  Patient has lost 1lb since her lost follow up  Tolerating Mounjaro without any side effects  Will increase to 7.5mg weekly  Will follow up in 4 weeks  - Tirzepatide (Mounjaro) 7.5 MG/0.5ML SOAJ; Inject 7.5 mg under the skin once a week  Dispense: 6.5 mL; Refill: 0    Return in about 5 weeks (around 2025).     Chief Complaint:     Chief Complaint   Patient presents with    Follow-up        History of Present Illness:     Ms. Villafana presents for a follow up visit. She complaints of numbness, tingling and some weakness in her left hand, particularly her thumb, index, middle finger. 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 Reports that she has not noticed any side effects from taking mounjaro.     The following portions of the patient's history were reviewed and updated as appropriate: allergies, current medications, past family history, past medical history, past social history, past surgical history and problem list.     Review of Systems:     Review of Systems   Constitutional:  Negative for chills and fever.   HENT:  Negative for ear pain and sore throat.    Eyes:  Negative for pain and visual disturbance.   Respiratory:  Negative for cough and shortness of breath.    Cardiovascular:  Negative for chest pain and palpitations.   Gastrointestinal:  Negative for abdominal pain and vomiting.   Genitourinary:  Negative for dysuria and hematuria.   Musculoskeletal:  Negative for arthralgias and back pain.   Skin:  Negative  "for color change and rash.   Neurological:  Negative for seizures and syncope.   All other systems reviewed and are negative.       Problem List:     Patient Active Problem List   Diagnosis    Type 2 diabetes mellitus without complication, without long-term current use of insulin (HCC)    Hypertension    Depression    Morbid obesity (HCC)    Encounter for screening mammogram for malignant neoplasm of breast    Leg swelling    Muscle cramp    ANTONETTE (obstructive sleep apnea)    Impaired cognition    Need for influenza vaccination    Acute rhinitis    Hypovitaminosis D    Elevated TSH    Essential hypertension    Itching    Carpal tunnel syndrome of left wrist        Objective:     /82 (BP Location: Right arm)   Pulse 85   Temp 98.6 °F (37 °C)   Ht 5' 3.25\" (1.607 m)   Wt 133 kg (292 lb 9.6 oz)   SpO2 96%   BMI 51.42 kg/m²     Physical Exam  Vitals and nursing note reviewed.   Constitutional:       General: He is not in acute distress.     Appearance: He is well-developed.   HENT:      Head: Normocephalic and atraumatic.   Eyes:      Conjunctiva/sclera: Conjunctivae normal.   Cardiovascular:      Rate and Rhythm: Normal rate and regular rhythm.      Heart sounds: No murmur heard.  Pulmonary:      Effort: Pulmonary effort is normal. No respiratory distress.      Breath sounds: Normal breath sounds.   Abdominal:      Palpations: Abdomen is soft.      Tenderness: There is no abdominal tenderness.   Musculoskeletal:         General: No swelling.      Cervical back: Neck supple.   Skin:     General: Skin is warm and dry.      Capillary Refill: Capillary refill takes less than 2 seconds.   Neurological:      Mental Status: He is alert.      Comments: Paresthesia in her left hand and weakness   Psychiatric:         Mood and Affect: Mood normal.         Pertinent Laboratory/Diagnostic Studies:    Laboratory Results: I have personally reviewed the pertinent laboratory results/reports     CBC:   Results from Last 12 " Months   Lab Units 05/22/24  0725   WBC Thousand/uL 7.20   RBC Million/uL 4.87   HEMOGLOBIN g/dL 12.8   HEMATOCRIT % 42.0   MCV fL 86   MCH pg 26.3*   MCHC g/dL 30.5*   RDW % 15.2*   MPV fL 10.5   PLATELETS Thousands/uL 251   NRBC AUTO /100 WBCs 0   SEGS PCT % 66   LYMPHO PCT % 24   MONO PCT % 7   EOS PCT % 2   BASOS PCT % 1   TOTAL NEUT ABS Thousands/µL 4.75   LYMPHS ABS Thousands/µL 1.72   MONOS ABS Thousand/µL 0.52   EOS ABS Thousand/µL 0.13     Chemistry Profile:   Results from Last 12 Months   Lab Units 05/22/24  0725   POTASSIUM mmol/L 4.0   CHLORIDE mmol/L 103   CO2 mmol/L 28   BUN mg/dL 16   CREATININE mg/dL 0.77   GLUCOSE FASTING mg/dL 145*   CALCIUM mg/dL 9.2   MAGNESIUM mg/dL 1.8*   EGFR ml/min/1.73sq m 82       Radiology/Other Diagnostic Testing Results:     Nelly Tompkins MD  [facility] INTERNAL MEDICINE [place]

## 2025-05-13 DIAGNOSIS — F32.0 CURRENT MILD EPISODE OF MAJOR DEPRESSIVE DISORDER WITHOUT PRIOR EPISODE (HCC): ICD-10-CM

## 2025-05-14 RX ORDER — DULOXETIN HYDROCHLORIDE 30 MG/1
30 CAPSULE, DELAYED RELEASE ORAL DAILY
Qty: 30 CAPSULE | Refills: 0 | Status: SHIPPED | OUTPATIENT
Start: 2025-05-14

## 2025-06-17 DIAGNOSIS — F32.0 CURRENT MILD EPISODE OF MAJOR DEPRESSIVE DISORDER WITHOUT PRIOR EPISODE (HCC): ICD-10-CM

## 2025-06-18 RX ORDER — DULOXETIN HYDROCHLORIDE 30 MG/1
30 CAPSULE, DELAYED RELEASE ORAL DAILY
Qty: 30 CAPSULE | Refills: 5 | Status: SHIPPED | OUTPATIENT
Start: 2025-06-18

## 2025-06-24 ENCOUNTER — ANNUAL EXAM (OUTPATIENT)
Dept: OBGYN CLINIC | Facility: CLINIC | Age: 65
End: 2025-06-24
Payer: COMMERCIAL

## 2025-06-24 VITALS
DIASTOLIC BLOOD PRESSURE: 94 MMHG | SYSTOLIC BLOOD PRESSURE: 138 MMHG | WEIGHT: 293 LBS | HEIGHT: 63 IN | BODY MASS INDEX: 51.91 KG/M2

## 2025-06-24 DIAGNOSIS — Z12.31 SCREENING MAMMOGRAM FOR BREAST CANCER: ICD-10-CM

## 2025-06-24 DIAGNOSIS — Z01.419 ENCOUNTER FOR GYNECOLOGICAL EXAMINATION (GENERAL) (ROUTINE) WITHOUT ABNORMAL FINDINGS: Primary | ICD-10-CM

## 2025-06-24 PROCEDURE — S0612 ANNUAL GYNECOLOGICAL EXAMINA: HCPCS | Performed by: PHYSICIAN ASSISTANT

## 2025-06-24 NOTE — PROGRESS NOTES
Annual Wellness Visit  Name: Ana Gonzalez      : 1960      MRN: [id number]  Encounter Provider: Dena Miranda PA-C  Encounter Date: 2025   Encounter department: [facility] [place]    65 y.o.  yo presents today for her annual exam.:  Assessment & Plan  Encounter for gynecological examination (general) (routine) without abnormal findings    Pleasant 65-year-old female presenting today for routine well woman GYN exam.  She has no specific GYN related concerns or complaints for me today.    Age-appropriate recommendations regarding screenings and prevention were reviewed with her in detail today.  Pap smear performed 2024 negative/negative HPV, ASCCP guidelines reviewed, can repeat screening again in .  Safe sex practices encouraged  Declines STD screening as she has not been sexually active.  No menopausal symptoms or vaginal dryness.    Patient overdue for routine mammogram screening.  New order provided today encouraged to complete  Self breast exam also encouraged as she feels comfortable    Patient also reports overdue for colonoscopy screening  Patient reports she has been given a referral previously and was encouraged to consider scheduling.    Stressed importance of maintaining healthy lifestyle with patient today.  She is on Mounjaro and efforts for weight loss and BMI reduction encouraged a healthy well-balanced clean diet and incorporating exercise including some strength training into her regular activity.  Appropriate vitamins discussed including calcium and vitamin D  Continued management with PCP for chronic conditions including hypertension, hyperlipidemia, diabetes, ANTONETTE and depression encouraged.    Screening mammogram for breast cancer    Orders:  •  Mammo screening bilateral w 3d and cad; Future        RTO 1 year annual exam, sooner if problems arise in the interim.      Chief Complaint   Patient presents with   • Gynecologic Exam         History of Present Illness     Ana Gonzalez is a 65 y.o. female who presents for annual well woman exam.    She has no GYN related concerns or questions today.    GYN:  denies vaginal discharge, labial erythema or lesions, dyspareunia.  Patient is postmenopausal - denies PMB  Contraception: PM.  Patient is not sexually active.  Denies hx of gynecologic surgeries.    OB:   female  Pregnancies were .    :  denies dysuria, urinary frequency or urgency.  denies hematuria, flank pain  She does note urinary incontinence, some urgency when she stands.   She is constipated - on mounjaro. Denies blood in stool pr diarrhea/.     Breast:  denies breast mass, skin changes, dimpling, reddening, nipple retraction.  denies breast discharge.  Patient does  have a family history of breast (sister stage 1), denies endometrial, or ovarian ca.     General:  Diet: she is trying to eat better since starting the moujaro. Calcium, vit D and MVI  Exercise: Reviewed recommendation of 150 minutes of moderate intensity exercise per week; no current formal exercise  ETOH use: social  Tobacco use: denies  Recreational drug use: denies    Screening:  Cervical cancer: last pap smear in 2024. Results were neg.  Breast cancer: last mammogram in 2019 (?).   Colon cancer: last colonoscopy unknown - pt reports has referral  STD screening: declines.  Depression screenin on 2025    Review of Systems   Constitutional:  Negative for activity change, appetite change and unexpected weight change.   Respiratory: Negative.     Cardiovascular: Negative.    Gastrointestinal:  Positive for constipation (occasionally with mounjaro). Negative for nausea and vomiting.   Genitourinary:  Negative for difficulty urinating, dysuria, flank pain, hematuria, pelvic pain, urgency, vaginal bleeding, vaginal discharge and vaginal pain.   Neurological:  Negative for dizziness, light-headedness and headaches.   Psychiatric/Behavioral:           "Increased stress - pt caring for son with MS            Objective   /94 (BP Location: Left arm, Patient Position: Sitting, Cuff Size: Adult) Comment: patient states has not taken BP med yet  Ht 5' 3.25\" (1.607 m)   Wt 133 kg (294 lb)   BMI 51.67 kg/m²      Physical Exam  Vitals reviewed.   Constitutional:       Appearance: Normal appearance. He is obese. He is not ill-appearing.     Cardiovascular:      Rate and Rhythm: Normal rate and regular rhythm.      Heart sounds: Normal heart sounds.   Pulmonary:      Effort: Pulmonary effort is normal.      Breath sounds: Normal breath sounds.   Chest:   Breasts:     Breasts are symmetrical.      Right: No swelling, bleeding, inverted nipple, mass, nipple discharge, skin change or tenderness.      Left: No swelling, bleeding, inverted nipple, mass, nipple discharge, skin change or tenderness.   Abdominal:      General: There is no distension.      Palpations: Abdomen is soft.      Tenderness: There is no abdominal tenderness. There is no guarding.   Genitourinary:     General: Normal vulva.      Labia:         Right: No rash, tenderness or lesion.         Left: No rash, tenderness or lesion.       Vagina: No signs of injury. No vaginal discharge, erythema, tenderness or bleeding.      Uterus: Not tender.       Adnexa:         Right: No tenderness.          Left: No tenderness.        Comments: Difficulty fully visualizing cervix on exam today    Musculoskeletal:      Cervical back: Neck supple. No tenderness.   Lymphadenopathy:      Upper Body:      Right upper body: No axillary or pectoral adenopathy.      Left upper body: No axillary or pectoral adenopathy.      Lower Body: No right inguinal adenopathy. No left inguinal adenopathy.     Neurological:      Mental Status: He is alert and oriented to person, place, and time.     Psychiatric:         Mood and Affect: Mood normal.         Behavior: Behavior normal. Behavior is cooperative.             "

## 2025-06-26 ENCOUNTER — OFFICE VISIT (OUTPATIENT)
Dept: INTERNAL MEDICINE CLINIC | Facility: CLINIC | Age: 65
End: 2025-06-26
Payer: COMMERCIAL

## 2025-06-26 VITALS
OXYGEN SATURATION: 94 % | HEIGHT: 63 IN | WEIGHT: 293 LBS | HEART RATE: 74 BPM | RESPIRATION RATE: 16 BRPM | DIASTOLIC BLOOD PRESSURE: 88 MMHG | SYSTOLIC BLOOD PRESSURE: 132 MMHG | TEMPERATURE: 98.4 F | BODY MASS INDEX: 51.91 KG/M2

## 2025-06-26 DIAGNOSIS — I10 HYPERTENSION, UNSPECIFIED TYPE: ICD-10-CM

## 2025-06-26 DIAGNOSIS — Z00.00 ANNUAL PHYSICAL EXAM: Primary | ICD-10-CM

## 2025-06-26 DIAGNOSIS — E66.01 MORBID OBESITY (HCC): ICD-10-CM

## 2025-06-26 DIAGNOSIS — E11.9 TYPE 2 DIABETES MELLITUS WITHOUT COMPLICATION, WITHOUT LONG-TERM CURRENT USE OF INSULIN (HCC): ICD-10-CM

## 2025-06-26 DIAGNOSIS — K59.00 CONSTIPATION, UNSPECIFIED CONSTIPATION TYPE: ICD-10-CM

## 2025-06-26 DIAGNOSIS — G47.33 OSA (OBSTRUCTIVE SLEEP APNEA): ICD-10-CM

## 2025-06-26 DIAGNOSIS — E55.9 VITAMIN D DEFICIENCY: ICD-10-CM

## 2025-06-26 LAB — SL AMB POCT HEMOGLOBIN AIC: 6.3 (ref ?–6.5)

## 2025-06-26 PROCEDURE — 99396 PREV VISIT EST AGE 40-64: CPT | Performed by: STUDENT IN AN ORGANIZED HEALTH CARE EDUCATION/TRAINING PROGRAM

## 2025-06-26 PROCEDURE — 83036 HEMOGLOBIN GLYCOSYLATED A1C: CPT | Performed by: STUDENT IN AN ORGANIZED HEALTH CARE EDUCATION/TRAINING PROGRAM

## 2025-06-26 PROCEDURE — 99214 OFFICE O/P EST MOD 30 MIN: CPT | Performed by: STUDENT IN AN ORGANIZED HEALTH CARE EDUCATION/TRAINING PROGRAM

## 2025-06-26 RX ORDER — TIRZEPATIDE 7.5 MG/.5ML
7.5 INJECTION, SOLUTION SUBCUTANEOUS WEEKLY
Qty: 6.5 ML | Refills: 0 | Status: SHIPPED | OUTPATIENT
Start: 2025-06-26

## 2025-06-26 RX ORDER — AMOXICILLIN 250 MG
1 CAPSULE ORAL DAILY
Qty: 90 TABLET | Refills: 0 | Status: SHIPPED | OUTPATIENT
Start: 2025-06-26

## 2025-06-26 NOTE — ASSESSMENT & PLAN NOTE
She is using BiPAP more and feeling better  Admits that her sleep is interrupted when she needs to take care of her son who is bed bound due to MS

## 2025-06-26 NOTE — ASSESSMENT & PLAN NOTE
Lab Results   Component Value Date    HGBA1C 6.3 06/26/2025   Pt admits some sx of indigestion, nausea and constipation   Admits overeating despite not being hungry  Concern for developing Gastroparesis, if sx not resolving after correction of eating habits GLP-1 should be discontinued  Didn't see ophthalmology  for several years, encouraged to schedule the visit  POC A1c 6.3  Diabetic foot exam done today  C/w Metformin and Mounjaro  Lifestyle modifications encouraged  Eating habits modifications, pt encouraged to keep food journal and to bring it for the next visit    Orders:    Ambulatory Referral to Gastroenterology; Future    CBC and differential; Future    Basic metabolic panel; Future    Lipid panel; Future    TSH, 3rd generation with Free T4 reflex; Future    POCT hemoglobin A1c    Ambulatory Referral to Weight Management; Future    Tirzepatide (Mounjaro) 7.5 MG/0.5ML SOAJ; Inject 7.5 mg under the skin once a week

## 2025-06-26 NOTE — PROGRESS NOTES
Adult Annual Physical  Name: Ana Gonzalez      : 1960      MRN: [id number]  Encounter Provider: Ashanti Polanco MD  Encounter Date: 2025   Encounter department: [facility] INTERNAL MEDICINE [place]    :  Assessment & Plan  Type 2 diabetes mellitus without complication, without long-term current use of insulin (HCC)    Lab Results   Component Value Date    HGBA1C 6.3 2025   Pt admits some sx of indigestion, nausea and constipation   Admits overeating despite not being hungry  Concern for developing Gastroparesis, if sx not resolving after correction of eating habits GLP-1 should be discontinued  Didn't see ophthalmology  for several years, encouraged to schedule the visit  POC A1c 6.3  Diabetic foot exam done today  C/w Metformin and Mounjaro  Lifestyle modifications encouraged  Eating habits modifications, pt encouraged to keep food journal and to bring it for the next visit    Orders:    Ambulatory Referral to Gastroenterology; Future    CBC and differential; Future    Basic metabolic panel; Future    Lipid panel; Future    TSH, 3rd generation with Free T4 reflex; Future    POCT hemoglobin A1c    Ambulatory Referral to Weight Management; Future    Tirzepatide (Mounjaro) 7.5 MG/0.5ML SOAJ; Inject 7.5 mg under the skin once a week    ANTONETTE (obstructive sleep apnea)  She is using BiPAP more and feeling better  Admits that her sleep is interrupted when she needs to take care of her son who is bed bound due to MS       Hypertension, unspecified type  Well controlled  C/w Lotrel 5-40 mg daily       Morbid obesity (HCC)      States that she has SE on Mounjaro: burping, indigestion, nausea and constipation  Pt admits overeating even when she is not hungry, she admits that she has less to no SE when she is eating better  Didn't loss weight since started on Mounjaro  Pt was explained the risk of gastroparesis  Keep food/symptoms log  If with improvement of eating Habits symptoms still  present Mounjaro should be DC  Weight management referral provided    Orders:    Tirzepatide (Mounjaro) 7.5 MG/0.5ML SOAJ; Inject 7.5 mg under the skin once a week    Vitamin D deficiency  H/o vit D deficiency  C/w supplement  Recheck vit D level    Orders:    Vitamin D 25 hydroxy; Future    Constipation, unspecified constipation type  Enrich your diet with fiber  Start Senekot S daily   Orders:    senna-docusate sodium (SENOKOT S) 8.6-50 mg per tablet; Take 1 tablet by mouth daily    Annual physical exam  RTC in 1 month  Proceed with ordered blood work one week before the next visit           Preventive Screenings:  - Diabetes Screening: screening not indicated and has diabetes  - Hepatitis C screening: screening up-to-date   - HIV screening: screening up-to-date   - Cervical cancer screening: screening up-to-date   - Breast cancer screening: risks/benefits discussed   - Colon cancer screening: orders placed   - Lung cancer screening: screening not indicated       Depression Screening and Follow-up Plan: Patient's depression screening was positive with a PHQ-9 score of 8.   Patient assessed for underlying major depression. Brief counseling provided and recommend additional follow-up/re-evaluation next office visit.         History of Present Illness     Adult Annual Physical:  Patient presents for annual physical.     Diet and Physical Activity:  - Diet/Nutrition: well balanced diet and consuming 3-5 servings of fruits/vegetables daily.  - Exercise: no formal exercise.    Depression Screening:    - PHQ-9 Score: 8    General Health:  - Sleep: 4-6 hours of sleep on average. using bypap  - Hearing: normal hearing bilateral ears.  - Vision: wears glasses.  - Dental: no dental visits for > 1 year, brushes teeth twice daily and floss regularly.    Review of Systems   Constitutional:  Negative for chills and fever.   HENT:  Negative for ear pain and sore throat.    Eyes:  Negative for pain and visual disturbance.  "  Respiratory:  Negative for cough and shortness of breath.    Cardiovascular:  Negative for chest pain and palpitations.   Gastrointestinal:  Positive for constipation and nausea. Negative for abdominal pain and vomiting.        Indigestion   Genitourinary:  Negative for dysuria and hematuria.   Musculoskeletal:  Negative for arthralgias and back pain.   Skin:  Negative for color change and rash.   Neurological:  Negative for seizures and syncope.   All other systems reviewed and are negative.        Objective   /88 (BP Location: Left arm, Patient Position: Sitting, Cuff Size: Large)   Pulse 74   Temp 98.4 °F (36.9 °C) (Tympanic)   Resp 16   Ht 5' 3.25\" (1.607 m)   Wt 133 kg (293 lb)   SpO2 94%   BMI 51.49 kg/m²     Physical Exam  Vitals and nursing note reviewed.   Constitutional:       General: He is not in acute distress.     Appearance: He is well-developed. He is obese.   HENT:      Head: Normocephalic and atraumatic.     Eyes:      Conjunctiva/sclera: Conjunctivae normal.       Cardiovascular:      Rate and Rhythm: Normal rate and regular rhythm.      Pulses: no weak pulses.           Dorsalis pedis pulses are 2+ on the right side and 2+ on the left side.        Posterior tibial pulses are 2+ on the right side and 2+ on the left side.      Heart sounds: No murmur heard.  Pulmonary:      Effort: Pulmonary effort is normal. No respiratory distress.      Breath sounds: Normal breath sounds.   Abdominal:      Palpations: Abdomen is soft.      Tenderness: There is no abdominal tenderness.     Musculoskeletal:         General: No swelling.      Cervical back: Neck supple.      Right lower leg: Edema present.      Left lower leg: Edema present.   Feet:      Right foot:      Skin integrity: No ulcer, skin breakdown, erythema, warmth, callus or dry skin.      Left foot:      Skin integrity: No ulcer, skin breakdown, erythema, warmth, callus or dry skin.     Skin:     General: Skin is warm and dry.      " Capillary Refill: Capillary refill takes less than 2 seconds.     Neurological:      Mental Status: He is alert.     Psychiatric:         Mood and Affect: Mood normal.     Diabetic Foot Exam    Patient's shoes and socks removed.    Right Foot/Ankle   Right Foot Inspection  Skin Exam: skin normal and skin intact. No dry skin, no warmth, no callus, no erythema, no maceration, no abnormal color, no pre-ulcer, no ulcer and no callus.     Toe Exam: ROM and strength within normal limits. No swelling and no tenderness    Sensory   Vibration: intact  Proprioception: intact  Monofilament testing: intact    Vascular  Capillary refills: < 3 seconds  The right DP pulse is 2+. The right PT pulse is 2+.     Left Foot/Ankle  Left Foot Inspection  Skin Exam: skin normal and skin intact. No dry skin, no warmth, no erythema, no maceration, normal color, no pre-ulcer, no ulcer and no callus.     Toe Exam: ROM and strength within normal limits. No swelling and no tenderness.     Sensory   Vibration: intact  Proprioception: intact  Monofilament testing: intact    Vascular  Capillary refills: < 3 seconds  The left DP pulse is 2+. The left PT pulse is 2+.     Assign Risk Category  No deformity present  No loss of protective sensation  No weak pulses  Risk: 0

## 2025-06-26 NOTE — ASSESSMENT & PLAN NOTE
Enrich your diet with fiber  Start Senekot S daily   Orders:    senna-docusate sodium (SENOKOT S) 8.6-50 mg per tablet; Take 1 tablet by mouth daily

## 2025-06-26 NOTE — PATIENT INSTRUCTIONS
"Patient Education     Routine physical for adults   The Basics   Written by the doctors and editors at [facility]   What is a physical? -- A physical is a routine visit, or \"check-up,\" with your doctor. You might also hear it called a \"wellness visit\" or \"preventive visit.\"  During each visit, the doctor will:   Ask about your physical and mental health   Ask about your habits, behaviors, and lifestyle   Do an exam   Give you vaccines if needed   Talk to you about any medicines you take   Give advice about your health   Answer your questions  Getting regular check-ups is an important part of taking care of your health. It can help your doctor find and treat any problems you have. But it's also important for preventing health problems.  A routine physical is different from a \"sick visit.\" A sick visit is when you see a doctor because of a health concern or problem. Since physicals are scheduled ahead of time, you can think about what you want to ask the doctor.  How often should I get a physical? -- It depends on your age and health. In general, for people age 21 years and older:   If you are younger than 50 years, you might be able to get a physical every 3 years.   If you are 50 years or older, your doctor might recommend a physical every year.  If you have an ongoing health condition, like diabetes or high blood pressure, your doctor will probably want to see you more often.  What happens during a physical? -- In general, each visit will include:   Physical exam - The doctor or nurse will check your height, weight, heart rate, and blood pressure. They will also look at your eyes and ears. They will ask about how you are feeling and whether you have any symptoms that bother you.   Medicines - It's a good idea to bring a list of all the medicines you take to each doctor visit. Your doctor will talk to you about your medicines and answer any questions. Tell them if you are having any side effects that bother you. You " "should also tell them if you are having trouble paying for any of your medicines.   Habits and behaviors - This includes:   Your diet   Your exercise habits   Whether you smoke, drink alcohol, or use drugs   Whether you are sexually active   Whether you feel safe at home  Your doctor will talk to you about things you can do to improve your health and lower your risk of health problems. They will also offer help and support. For example, if you want to quit smoking, they can give you advice and might prescribe medicines. If you want to improve your diet or get more physical activity, they can help you with this, too.   Lab tests, if needed - The tests you get will depend on your age and situation. For example, your doctor might want to check your:   Cholesterol   Blood sugar   Iron level   Vaccines - The recommended vaccines will depend on your age, health, and what vaccines you already had. Vaccines are very important because they can prevent certain serious or deadly infections.   Discussion of screening - \"Screening\" means checking for diseases or other health problems before they cause symptoms. Your doctor can recommend screening based on your age, risk, and preferences. This might include tests to check for:   Cancer, such as breast, prostate, cervical, ovarian, colorectal, prostate, lung, or skin cancer   Sexually transmitted infections, such as chlamydia and gonorrhea   Mental health conditions like depression and anxiety  Your doctor will talk to you about the different types of screening tests. They can help you decide which screenings to have. They can also explain what the results might mean.   Answering questions - The physical is a good time to ask the doctor or nurse questions about your health. If needed, they can refer you to other doctors or specialists, too.  Adults older than 65 years often need other care, too. As you get older, your doctor will talk to you about:   How to prevent falling at " home   Hearing or vision tests   Memory testing   How to take your medicines safely   Making sure that you have the help and support you need at home  All topics are updated as new evidence becomes available and our peer review process is complete.  This topic retrieved from [employer] on: May 02, 2024.  Topic 468886 Version 1.0  Release: 32.4.3 - C32.122  © 2024 [employer] and/or its affiliates. All rights reserved.  Consumer Information Use and Disclaimer   Disclaimer: This generalized information is a limited summary of diagnosis, treatment, and/or medication information. It is not meant to be comprehensive and should be used as a tool to help the user understand and/or assess potential diagnostic and treatment options. It does NOT include all information about conditions, treatments, medications, side effects, or risks that may apply to a specific patient. It is not intended to be medical advice or a substitute for the medical advice, diagnosis, or treatment of a health care provider based on the health care provider's examination and assessment of a patient's specific and unique circumstances. Patients must speak with a health care provider for complete information about their health, medical questions, and treatment options, including any risks or benefits regarding use of medications. This information does not endorse any treatments or medications as safe, effective, or approved for treating a specific patient. [employer] and its affiliates disclaim any warranty or liability relating to this information or the use thereof.The use of this information is governed by the Terms of Use, available at https://www.woltersHyperinkuwer.com/en/know/clinical-effectiveness-terms. 2024© [employer] and its affiliates and/or licensors. All rights reserved.  Copyright   © 2024 [employer] and/or its affiliates. All rights reserved.

## 2025-06-26 NOTE — ASSESSMENT & PLAN NOTE
{If prescribing weight loss medication, click here to fill out prior auth smartform and then hit F2 with this smartlist to insert prior auth documentation (Optional):[id number]}    States that she has SE on Mounjaro: burping, indigestion, nausea and constipation  Pt admits overeating even when she is not hungry, she admits that she has less to no SE when she is eating better  Didn't loss weight since started on Mounjaro  Pt was explained the risk of gastroparesis  Keep food/symptoms log  If with improvement of eating Habits symptoms still present Mounjaro should be DC  Weight management referral provided    Orders:    Tirzepatide (Mounjaro) 7.5 MG/0.5ML SOAJ; Inject 7.5 mg under the skin once a week

## 2025-07-23 ENCOUNTER — APPOINTMENT (OUTPATIENT)
Dept: LAB | Facility: CLINIC | Age: 65
End: 2025-07-23
Payer: COMMERCIAL

## 2025-07-23 DIAGNOSIS — E11.9 TYPE 2 DIABETES MELLITUS WITHOUT COMPLICATION, WITHOUT LONG-TERM CURRENT USE OF INSULIN (HCC): ICD-10-CM

## 2025-07-23 DIAGNOSIS — E55.9 VITAMIN D DEFICIENCY: ICD-10-CM

## 2025-07-23 LAB
25(OH)D3 SERPL-MCNC: 21.3 NG/ML (ref 30–100)
ANION GAP SERPL CALCULATED.3IONS-SCNC: 6 MMOL/L (ref 4–13)
BASOPHILS # BLD AUTO: 0.07 THOUSANDS/ÂΜL (ref 0–0.1)
BASOPHILS NFR BLD AUTO: 1 % (ref 0–1)
BUN SERPL-MCNC: 16 MG/DL (ref 5–25)
CALCIUM SERPL-MCNC: 9.5 MG/DL (ref 8.4–10.2)
CHLORIDE SERPL-SCNC: 104 MMOL/L (ref 96–108)
CHOLEST SERPL-MCNC: 155 MG/DL (ref ?–200)
CO2 SERPL-SCNC: 30 MMOL/L (ref 21–32)
CREAT SERPL-MCNC: 0.8 MG/DL (ref 0.6–1.3)
EOSINOPHIL # BLD AUTO: 0.13 THOUSAND/ÂΜL (ref 0–0.61)
EOSINOPHIL NFR BLD AUTO: 2 % (ref 0–6)
ERYTHROCYTE [DISTWIDTH] IN BLOOD BY AUTOMATED COUNT: 15.3 % (ref 11.6–15.1)
GFR SERPL CREATININE-BSD FRML MDRD: 78 ML/MIN/1.73SQ M
GLUCOSE P FAST SERPL-MCNC: 109 MG/DL (ref 65–99)
HCT VFR BLD AUTO: 41 % (ref 34.8–46.1)
HDLC SERPL-MCNC: 50 MG/DL
HGB BLD-MCNC: 12.4 G/DL (ref 11.5–15.4)
IMM GRANULOCYTES # BLD AUTO: 0.01 THOUSAND/UL (ref 0–0.2)
IMM GRANULOCYTES NFR BLD AUTO: 0 % (ref 0–2)
LDLC SERPL CALC-MCNC: 85 MG/DL (ref 0–100)
LYMPHOCYTES # BLD AUTO: 1.81 THOUSANDS/ÂΜL (ref 0.6–4.47)
LYMPHOCYTES NFR BLD AUTO: 23 % (ref 14–44)
MCH RBC QN AUTO: 25.7 PG (ref 26.8–34.3)
MCHC RBC AUTO-ENTMCNC: 30.2 G/DL (ref 31.4–37.4)
MCV RBC AUTO: 85 FL (ref 82–98)
MONOCYTES # BLD AUTO: 0.57 THOUSAND/ÂΜL (ref 0.17–1.22)
MONOCYTES NFR BLD AUTO: 7 % (ref 4–12)
NEUTROPHILS # BLD AUTO: 5.15 THOUSANDS/ÂΜL (ref 1.85–7.62)
NEUTS SEG NFR BLD AUTO: 67 % (ref 43–75)
NONHDLC SERPL-MCNC: 105 MG/DL
NRBC BLD AUTO-RTO: 0 /100 WBCS
PLATELET # BLD AUTO: 249 THOUSANDS/UL (ref 149–390)
PMV BLD AUTO: 10.7 FL (ref 8.9–12.7)
POTASSIUM SERPL-SCNC: 4 MMOL/L (ref 3.5–5.3)
RBC # BLD AUTO: 4.82 MILLION/UL (ref 3.81–5.12)
SODIUM SERPL-SCNC: 140 MMOL/L (ref 135–147)
T4 FREE SERPL-MCNC: 0.82 NG/DL (ref 0.61–1.12)
TRIGL SERPL-MCNC: 100 MG/DL (ref ?–150)
TSH SERPL DL<=0.05 MIU/L-ACNC: 5.36 UIU/ML (ref 0.45–4.5)
WBC # BLD AUTO: 7.74 THOUSAND/UL (ref 4.31–10.16)

## 2025-07-23 PROCEDURE — 82306 VITAMIN D 25 HYDROXY: CPT

## 2025-07-23 PROCEDURE — 80061 LIPID PANEL: CPT

## 2025-07-23 PROCEDURE — 84439 ASSAY OF FREE THYROXINE: CPT

## 2025-07-23 PROCEDURE — 36415 COLL VENOUS BLD VENIPUNCTURE: CPT

## 2025-07-23 PROCEDURE — 85025 COMPLETE CBC W/AUTO DIFF WBC: CPT

## 2025-07-23 PROCEDURE — 80048 BASIC METABOLIC PNL TOTAL CA: CPT

## 2025-07-23 PROCEDURE — 84443 ASSAY THYROID STIM HORMONE: CPT

## 2025-07-27 ENCOUNTER — HOSPITAL ENCOUNTER (OUTPATIENT)
Facility: HOSPITAL | Age: 65
Discharge: HOME | End: 2025-07-27
Payer: COMMERCIAL

## 2025-07-27 DIAGNOSIS — Z12.31 SCREENING MAMMOGRAM FOR BREAST CANCER: ICD-10-CM

## 2025-07-27 PROCEDURE — 77067 SCR MAMMO BI INCL CAD: CPT

## 2025-07-27 PROCEDURE — 77063 BREAST TOMOSYNTHESIS BI: CPT

## 2025-07-28 ENCOUNTER — OFFICE VISIT (OUTPATIENT)
Dept: INTERNAL MEDICINE CLINIC | Facility: CLINIC | Age: 65
End: 2025-07-28
Payer: COMMERCIAL

## 2025-07-28 VITALS
OXYGEN SATURATION: 95 % | BODY MASS INDEX: 51.91 KG/M2 | SYSTOLIC BLOOD PRESSURE: 148 MMHG | DIASTOLIC BLOOD PRESSURE: 88 MMHG | WEIGHT: 293 LBS | TEMPERATURE: 97.8 F | HEIGHT: 63 IN | HEART RATE: 78 BPM

## 2025-07-28 DIAGNOSIS — R79.89 ELEVATED TSH: Primary | ICD-10-CM

## 2025-07-28 DIAGNOSIS — E55.9 HYPOVITAMINOSIS D: ICD-10-CM

## 2025-07-28 DIAGNOSIS — E66.01 MORBID OBESITY (HCC): ICD-10-CM

## 2025-07-28 DIAGNOSIS — K59.00 CONSTIPATION, UNSPECIFIED CONSTIPATION TYPE: ICD-10-CM

## 2025-07-28 PROCEDURE — 99215 OFFICE O/P EST HI 40 MIN: CPT
